# Patient Record
Sex: MALE | Race: WHITE | NOT HISPANIC OR LATINO | Employment: OTHER | URBAN - METROPOLITAN AREA
[De-identification: names, ages, dates, MRNs, and addresses within clinical notes are randomized per-mention and may not be internally consistent; named-entity substitution may affect disease eponyms.]

---

## 2017-01-30 ENCOUNTER — OFFICE VISIT (OUTPATIENT)
Dept: MEDICAL GROUP | Facility: MEDICAL CENTER | Age: 62
End: 2017-01-30
Payer: COMMERCIAL

## 2017-01-30 VITALS
RESPIRATION RATE: 16 BRPM | BODY MASS INDEX: 32.48 KG/M2 | WEIGHT: 232 LBS | HEART RATE: 63 BPM | HEIGHT: 71 IN | DIASTOLIC BLOOD PRESSURE: 78 MMHG | OXYGEN SATURATION: 97 % | TEMPERATURE: 97.2 F | SYSTOLIC BLOOD PRESSURE: 122 MMHG

## 2017-01-30 DIAGNOSIS — E66.09 NON MORBID OBESITY DUE TO EXCESS CALORIES: ICD-10-CM

## 2017-01-30 DIAGNOSIS — R73.02 IGT (IMPAIRED GLUCOSE TOLERANCE): ICD-10-CM

## 2017-01-30 DIAGNOSIS — R07.9 CHEST PAIN, UNSPECIFIED TYPE: ICD-10-CM

## 2017-01-30 DIAGNOSIS — E78.5 DYSLIPIDEMIA: Chronic | ICD-10-CM

## 2017-01-30 DIAGNOSIS — Z23 INFLUENZA VACCINE NEEDED: ICD-10-CM

## 2017-01-30 DIAGNOSIS — I10 HTN (HYPERTENSION), BENIGN: Chronic | ICD-10-CM

## 2017-01-30 PROCEDURE — 90471 IMMUNIZATION ADMIN: CPT | Performed by: INTERNAL MEDICINE

## 2017-01-30 PROCEDURE — 93000 ELECTROCARDIOGRAM COMPLETE: CPT | Performed by: INTERNAL MEDICINE

## 2017-01-30 PROCEDURE — 90686 IIV4 VACC NO PRSV 0.5 ML IM: CPT | Performed by: INTERNAL MEDICINE

## 2017-01-30 PROCEDURE — 99214 OFFICE O/P EST MOD 30 MIN: CPT | Mod: 25 | Performed by: INTERNAL MEDICINE

## 2017-01-30 RX ORDER — AMLODIPINE BESYLATE AND BENAZEPRIL HYDROCHLORIDE 5; 20 MG/1; MG/1
1 CAPSULE ORAL
Qty: 90 CAP | Refills: 3 | Status: SHIPPED | OUTPATIENT
Start: 2017-01-30 | End: 2017-08-01 | Stop reason: SDUPTHER

## 2017-01-30 RX ORDER — SERTRALINE HYDROCHLORIDE 100 MG/1
200 TABLET, FILM COATED ORAL DAILY
Qty: 180 TAB | Refills: 3 | Status: SHIPPED | OUTPATIENT
Start: 2017-01-30 | End: 2017-03-24 | Stop reason: SDUPTHER

## 2017-01-30 ASSESSMENT — PATIENT HEALTH QUESTIONNAIRE - PHQ9: CLINICAL INTERPRETATION OF PHQ2 SCORE: 0

## 2017-01-30 NOTE — PROGRESS NOTES
"CC: Follow-up multiple issues    HPI:   Dean presents today with the following.    1. Non morbid obesity due to excess calories  Not been seen for over a year weight has gone up and that time reporting minimal exercise or dietary changes.    2. HTN (hypertension), benign  Blood pressure under good control maintain on medications. He denies any lower extremity edema.    3. Dyslipidemia  Cholesterol elevated in the past likely needing statin has not taken for several years. He never followed up after elevated blood work last year.    4. IGT (impaired glucose tolerance)  Also elevated blood sugars no personal history of diabetes but again weight has gone up.    5. Chest pain, unspecified type  He does report episodes of chest pain or substernal in nature lasting 2-3 seconds not necessarily related to activity. No associated shortness of breath again not brought on by activity. He reports he had a stress test several years ago but results are not available.    6. Influenza vaccine needed        Patient Active Problem List    Diagnosis Date Noted   • Non morbid obesity due to excess calories 01/30/2017   • HTN (hypertension), benign 01/05/2012   • Dyslipidemia 01/05/2012   • Depression 01/05/2012   • BPH (benign prostatic hyperplasia) 01/05/2012   • ADD (attention deficit disorder) 01/05/2012   • ED (erectile dysfunction) 01/05/2012       Current Outpatient Prescriptions   Medication Sig Dispense Refill   • amlodipine-benazepril (LOTREL) 5-20 MG per capsule Take 1 Cap by mouth every day. 90 Cap 3   • sertraline (ZOLOFT) 100 MG Tab Take 2 Tabs by mouth every day. 180 Tab 3   • tadalafil (CIALIS) 10 MG tablet Take 1 Tab by mouth as needed for Erectile Dysfunction. 6 Tab 3     No current facility-administered medications for this visit.         Allergies as of 01/30/2017   • (No Known Allergies)        ROS: As per HPI.    /78 mmHg  Pulse 63  Temp(Src) 36.2 °C (97.2 °F)  Resp 16  Ht 1.803 m (5' 11\")  Wt 105.235 kg " (232 lb)  BMI 32.37 kg/m2  SpO2 97%    Physical Exam:  Gen:         Alert and oriented, No apparent distress.  Neck:        No Lymphadenopathy or Bruits.  Lungs:     Clear to auscultation bilaterally  CV:          Regular rate and rhythm. No murmurs, rubs or gallops.               Ext:          No clubbing, cyanosis, edema.    EKG:  Normal sinus rythm, no acute st-t wave changes.  Assessment and Plan.   61 y.o. male with the following issues.    1. Non morbid obesity due to excess calories  Discussed diet exercise and weight loss strategies. Have set a goal for 15 pounds in 3 months and will followup at that time.  - Patient identified as having weight management issue.  Appropriate orders and counseling given.    2. HTN (hypertension), benign  Currently well controlled, Discuss diet, exercise and salt restriction.  - amlodipine-benazepril (LOTREL) 5-20 MG per capsule; Take 1 Cap by mouth every day.  Dispense: 90 Cap; Refill: 3    3. Dyslipidemia  Recheck cholesterol  - COMP METABOLIC PANEL; Future  - LIPID PROFILE; Future    4. IGT (impaired glucose tolerance)  Again discussed diet and weight loss recheck blood work  - HEMOGLOBIN A1C; Future    5. Chest pain, unspecified type  EKG normal symptoms are not specific for chest pain having given ER precautions for any sustained chest pains. Will monitor for the next month and if symptoms are not abated may consider stress test.  - EKG    6. Influenza vaccine needed    - INFLUENZA VACCINE QUAD INJ >3Y(PF)

## 2017-01-30 NOTE — MR AVS SNAPSHOT
"        Dean Nobles   2017 9:20 AM   Office Visit   MRN: [id number]    Department:  [address] [place] [address]   Dept Phone:  [phone]    Description:  Male : 1955   Provider:  Jose Sanchez M.D.           Reason for Visit     Annual Exam     Medication Refill           Allergies as of 2017     No Known Allergies      You were diagnosed with     Non morbid obesity due to excess calories   [7030730]       HTN (hypertension), benign   [616718]       Dyslipidemia   [792594]       IGT (impaired glucose tolerance)   [726224]       Chest pain, unspecified type   [0023820]       Influenza vaccine needed   [[id number]]         Vital Signs     Blood Pressure Pulse Temperature Respirations Height Weight    122/78 mmHg 63 36.2 °C (97.2 °F) 16 1.803 m (5' 11\") 105.235 kg (232 lb)    Body Mass Index Oxygen Saturation Smoking Status             32.37 kg/m2 97% Never Smoker          Basic Information     Date Of Birth Sex Race Ethnicity Preferred Language    1955 Male White Non- English      Your appointments     Mar 13, 2017  9:40 AM   Established Patient with Jose Sanchez M.D.   [facility] [address]    [address]  [facility] [address]  [facility] [id number] [phone]           You will be receiving a confirmation call a few days before your appointment from our automated call confirmation system.              Problem List              ICD-10-CM Priority Class Noted - Resolved    HTN (hypertension), benign (Chronic) I10   2012 - Present    Dyslipidemia (Chronic) E78.5   2012 - Present    Depression (Chronic) F32.9   2012 - Present    BPH (benign prostatic hyperplasia) (Chronic) N40.0   2012 - Present    ADD (attention deficit disorder) (Chronic) F98.8   2012 - Present    ED (erectile dysfunction) (Chronic) N52.9   2012 - Present    Non morbid obesity due to excess calories E66.09   2017 - Present      Health Maintenance        Date Due Completion Dates   " IMM ZOSTER VACCINE 3/10/2013 ---    IMM INFLUENZA (1) 9/1/2016 11/18/2015, 11/18/2014, 11/15/2013, 11/21/2012, 1/5/2012    COLONOSCOPY 1/5/2018 1/5/2008 (Done)    Override on 1/5/2008: Done    IMM DTaP/Tdap/Td Vaccine (2 - Td) 1/21/2023 1/21/2013            Current Immunizations     Influenza TIV (IM) 11/15/2013, 11/21/2012, 1/5/2012    Influenza Vaccine Quad Inj (Pf)  Incomplete, 11/18/2015    Influenza Vaccine Quad Inj (Preserved) 11/18/2014    Tdap Vaccine 1/21/2013      Below and/or attached are the medications your provider expects you to take. Review all of your home medications and newly ordered medications with your provider and/or pharmacist. Follow medication instructions as directed by your provider and/or pharmacist. Please keep your medication list with you and share with your provider. Update the information when medications are discontinued, doses are changed, or new medications (including over-the-counter products) are added; and carry medication information at all times in the event of emergency situations     Allergies:  No Known Allergies          Medications  Valid as of: January 30, 2017 - 10:07 AM    Generic Name Brand Name Tablet Size Instructions for use    Amlodipine Besy-Benazepril HCl (Cap) LOTREL 5-20 MG Take 1 Cap by mouth every day.        Sertraline HCl (Tab) ZOLOFT 100 MG Take 2 Tabs by mouth every day.        Tadalafil (Tab) CIALIS 10 MG Take 1 Tab by mouth as needed for Erectile Dysfunction.        .                 Medicines prescribed today were sent to:     [facility] [place], [place] - [id number] [facility] [place] [address]    Phone: [phone] Fax: [phone]    Open 24 Hours?: No      Medication refill instructions:       If your prescription bottle indicates you have medication refills left, it is not necessary to call your provider’s office. Please contact your pharmacy and they will refill your medication.    If your prescription bottle indicates  you do not have any refills left, you may request refills at any time through one of the following ways: The online [employer] system (except Urgent Care), by calling your provider’s office, or by asking your pharmacy to contact your provider’s office with a refill request. Medication refills are processed only during regular business hours and may not be available until the next business day. Your provider may request additional information or to have a follow-up visit with you prior to refilling your medication.   *Please Note: Medication refills are assigned a new Rx number when refilled electronically. Your pharmacy may indicate that no refills were authorized even though a new prescription for the same medication is available at the pharmacy. Please request the medicine by name with the pharmacy before contacting your provider for a refill.        Your To Do List     Future Labs/Procedures Complete By Expires    COMP METABOLIC PANEL  As directed 1/31/2018    HEMOGLOBIN A1C  As directed 1/31/2018    LIPID PROFILE  As directed 1/31/2018         [employer] Access Code: Activation code not generated  Current [employer] Status: Active

## 2017-03-13 ENCOUNTER — APPOINTMENT (OUTPATIENT)
Dept: MEDICAL GROUP | Facility: MEDICAL CENTER | Age: 62
End: 2017-03-13
Payer: COMMERCIAL

## 2017-03-23 ENCOUNTER — OFFICE VISIT (OUTPATIENT)
Dept: MEDICAL GROUP | Facility: MEDICAL CENTER | Age: 62
End: 2017-03-23
Payer: COMMERCIAL

## 2017-03-23 VITALS
DIASTOLIC BLOOD PRESSURE: 74 MMHG | BODY MASS INDEX: 32.2 KG/M2 | HEIGHT: 71 IN | TEMPERATURE: 98.7 F | HEART RATE: 74 BPM | RESPIRATION RATE: 16 BRPM | SYSTOLIC BLOOD PRESSURE: 122 MMHG | WEIGHT: 230 LBS | OXYGEN SATURATION: 94 %

## 2017-03-23 DIAGNOSIS — R07.89 OTHER CHEST PAIN: ICD-10-CM

## 2017-03-23 DIAGNOSIS — E66.09 NON MORBID OBESITY DUE TO EXCESS CALORIES: ICD-10-CM

## 2017-03-23 DIAGNOSIS — E78.5 DYSLIPIDEMIA: Chronic | ICD-10-CM

## 2017-03-23 PROCEDURE — 99214 OFFICE O/P EST MOD 30 MIN: CPT | Performed by: INTERNAL MEDICINE

## 2017-03-23 RX ORDER — ATORVASTATIN CALCIUM 10 MG/1
10 TABLET, FILM COATED ORAL DAILY
Qty: 90 TAB | Refills: 3 | Status: SHIPPED | OUTPATIENT
Start: 2017-03-23 | End: 2017-08-01 | Stop reason: SDUPTHER

## 2017-03-23 NOTE — MR AVS SNAPSHOT
"        Dean Nobles   3/23/2017 8:20 AM   Office Visit   MRN: [id number]    Department:  [address] [place] [address]   Dept Phone:  [phone]    Description:  Male : 1955   Provider:  Jose Sanchez M.D.           Reason for Visit     Follow-Up           Allergies as of 3/23/2017     No Known Allergies      You were diagnosed with     Dyslipidemia   [884714]       Other chest pain   [786.59.ICD-9-CM]         Vital Signs     Blood Pressure Pulse Temperature Respirations Height Weight    122/74 mmHg 74 37.1 °C (98.7 °F) 16 1.803 m (5' 10.98\") 104.327 kg (230 lb)    Body Mass Index Oxygen Saturation Smoking Status             32.09 kg/m2 94% Never Smoker          Basic Information     Date Of Birth Sex Race Ethnicity Preferred Language    1955 Male White Non- English      Problem List              ICD-10-CM Priority Class Noted - Resolved    HTN (hypertension), benign (Chronic) I10   2012 - Present    Dyslipidemia (Chronic) E78.5   2012 - Present    Depression (Chronic) F32.9   2012 - Present    BPH (benign prostatic hyperplasia) (Chronic) N40.0   2012 - Present    ADD (attention deficit disorder) (Chronic) F98.8   2012 - Present    ED (erectile dysfunction) (Chronic) N52.9   2012 - Present    Non morbid obesity due to excess calories E66.09   2017 - Present      Health Maintenance        Date Due Completion Dates    IMM ZOSTER VACCINE 3/10/2013 ---    COLONOSCOPY 2018 (Done)    Override on 2008: Done    IMM DTaP/Tdap/Td Vaccine (2 - Td) 2023            Current Immunizations     Influenza TIV (IM) 11/15/2013, 2012, 2012    Influenza Vaccine Quad Inj (Pf) 2017 10:08 AM, 2015    Influenza Vaccine Quad Inj (Preserved) 2014    Tdap Vaccine 2013      Below and/or attached are the medications your provider expects you to take. Review all of your home medications and newly ordered medications with your provider and/or " pharmacist. Follow medication instructions as directed by your provider and/or pharmacist. Please keep your medication list with you and share with your provider. Update the information when medications are discontinued, doses are changed, or new medications (including over-the-counter products) are added; and carry medication information at all times in the event of emergency situations     Allergies:  No Known Allergies          Medications  Valid as of: March 23, 2017 -  8:39 AM    Generic Name Brand Name Tablet Size Instructions for use    Amlodipine Besy-Benazepril HCl (Cap) LOTREL 5-20 MG Take 1 Cap by mouth every day.        Atorvastatin Calcium (Tab) LIPITOR 10 MG Take 1 Tab by mouth every day.        Sertraline HCl (Tab) ZOLOFT 100 MG Take 2 Tabs by mouth every day.        Tadalafil (Tab) CIALIS 10 MG Take 1 Tab by mouth as needed for Erectile Dysfunction.        .                 Medicines prescribed today were sent to:     [facility] [address] [place] [address], [place] - [address] [address] [facility] [place] [address]    Phone: [phone] Fax: [phone]    Open 24 Hours?: No      Medication refill instructions:       If your prescription bottle indicates you have medication refills left, it is not necessary to call your provider’s office. Please contact your pharmacy and they will refill your medication.    If your prescription bottle indicates you do not have any refills left, you may request refills at any time through one of the following ways: The online [employer] system (except Urgent Care), by calling your provider’s office, or by asking your pharmacy to contact your provider’s office with a refill request. Medication refills are processed only during regular business hours and may not be available until the next business day. Your provider may request additional information or to have a follow-up visit with you prior to refilling your medication.   *Please Note: Medication refills are assigned a new  Rx number when refilled electronically. Your pharmacy may indicate that no refills were authorized even though a new prescription for the same medication is available at the pharmacy. Please request the medicine by name with the pharmacy before contacting your provider for a refill.        Your To Do List     Future Labs/Procedures Complete By Expires    CARDIAC STRESS TEST TREADMILL ONLY  As directed 3/23/2018    COMP METABOLIC PANEL  As directed 3/24/2018    LIPID PROFILE  As directed 3/24/2018    Scheduling Instructions:    Please provide patient with copy of results.         [employer] Access Code: Activation code not generated  Current [employer] Status: Active

## 2017-03-23 NOTE — PROGRESS NOTES
"CC: Follow-up cholesterol and chest pain.    HPI:   Dean presents today with the following.    1. Dyslipidemia  Presents after having blood work bring results with him. LDL cholesterol comes back at 148. He was on Lipitor in the past but stopped taking it. He denies any shortness of breath or edema.    2. Other chest pain  Last visit he did have complaints of some intermittent chest pain. EKG was unremarkable. He reports he's had one further recurrence just yesterday. He feels it may be related to heartburn more than anything. Denied any radiation of shortness of breath lasting for approximately 20 minutes before resolving.      3. Non morbid obesity due to excess calories  Weight is down 2 pounds since last visit reports he is not changed significantly in his dietary habits.      Patient Active Problem List    Diagnosis Date Noted   • Non morbid obesity due to excess calories 01/30/2017   • HTN (hypertension), benign 01/05/2012   • Dyslipidemia 01/05/2012   • Depression 01/05/2012   • BPH (benign prostatic hyperplasia) 01/05/2012   • ADD (attention deficit disorder) 01/05/2012   • ED (erectile dysfunction) 01/05/2012       Current Outpatient Prescriptions   Medication Sig Dispense Refill   • atorvastatin (LIPITOR) 10 MG Tab Take 1 Tab by mouth every day. 90 Tab 3   • amlodipine-benazepril (LOTREL) 5-20 MG per capsule Take 1 Cap by mouth every day. 90 Cap 3   • sertraline (ZOLOFT) 100 MG Tab Take 2 Tabs by mouth every day. 180 Tab 3   • tadalafil (CIALIS) 10 MG tablet Take 1 Tab by mouth as needed for Erectile Dysfunction. 6 Tab 3     No current facility-administered medications for this visit.         Allergies as of 03/23/2017   • (No Known Allergies)        ROS: As per HPI.    /74 mmHg  Pulse 74  Temp(Src) 37.1 °C (98.7 °F)  Resp 16  Ht 1.803 m (5' 10.98\")  Wt 104.327 kg (230 lb)  BMI 32.09 kg/m2  SpO2 94%    Physical Exam:  Gen:         Alert and oriented, No apparent distress.  Neck:        No " Lymphadenopathy or Bruits.  Lungs:     Clear to auscultation bilaterally  CV:          Regular rate and rhythm. No murmurs, rubs or gallops.               Ext:          No clubbing, cyanosis, edema.      Assessment and Plan.   62 y.o. male with the following issues.    1. Dyslipidemia  Starting back on statin cautioned about side effects recheck 3 months.  - atorvastatin (LIPITOR) 10 MG Tab; Take 1 Tab by mouth every day.  Dispense: 90 Tab; Refill: 3  - COMP METABOLIC PANEL; Future  - LIPID PROFILE; Future    2. Other chest pain  Given the recurrence of symptoms have written for a stress treadmill follow-up for abnormalities in ER precautions given for severe chest pain.  - CARDIAC STRESS TEST TREADMILL ONLY; Future    3. Non morbid obesity due to excess calories  Discussed diet exercise and weight loss strategies. Have set a goal for 15 pounds in 3 months and will followup at that time.

## 2017-03-24 RX ORDER — SERTRALINE HYDROCHLORIDE 100 MG/1
200 TABLET, FILM COATED ORAL DAILY
Qty: 180 TAB | Refills: 3 | Status: SHIPPED | OUTPATIENT
Start: 2017-03-24 | End: 2017-03-27 | Stop reason: SDUPTHER

## 2017-03-24 NOTE — TELEPHONE ENCOUNTER
Was the patient seen in the last year in this department? Yes     Does patient have an active prescription for medications requested? No     Received Request Via: Pharmacy Wants MICHELLE Removed

## 2017-03-27 RX ORDER — SERTRALINE HYDROCHLORIDE 100 MG/1
200 TABLET, FILM COATED ORAL DAILY
Qty: 180 TAB | Refills: 3 | Status: SHIPPED | OUTPATIENT
Start: 2017-03-27 | End: 2017-08-01 | Stop reason: SDUPTHER

## 2017-07-12 ENCOUNTER — NON-PROVIDER VISIT (OUTPATIENT)
Dept: CARDIOLOGY | Facility: MEDICAL CENTER | Age: 62
End: 2017-07-12
Attending: INTERNAL MEDICINE
Payer: COMMERCIAL

## 2017-07-12 DIAGNOSIS — R07.9 CHEST PAIN, UNSPECIFIED TYPE: ICD-10-CM

## 2017-07-12 DIAGNOSIS — R07.89 OTHER CHEST PAIN: ICD-10-CM

## 2017-07-12 LAB — TREADMILL STRESS: NORMAL

## 2017-07-12 PROCEDURE — 93015 CV STRESS TEST SUPVJ I&R: CPT | Performed by: INTERNAL MEDICINE

## 2017-07-17 NOTE — PROGRESS NOTES
Quick Note:    Dear Kym,    Can you please let Dean Nobles know that result is ok and I will see patient as scheduled?    Thanks [place].    ______

## 2017-07-18 ENCOUNTER — TELEPHONE (OUTPATIENT)
Dept: CARDIOLOGY | Facility: MEDICAL CENTER | Age: 62
End: 2017-07-18

## 2017-07-18 NOTE — TELEPHONE ENCOUNTER
Called patient and advised him of Dr. Brock's treadmill stress test interpretation.     JULIETTE ARCHER

## 2017-07-18 NOTE — TELEPHONE ENCOUNTER
----- Message from Vilma Brock M.D. sent at 7/17/2017  9:18 AM PDT -----  Dear Kym,    Can you please let Dean Nobles know that result is ok and I will see patient as scheduled?    Thanks [place].

## 2017-08-01 ENCOUNTER — OFFICE VISIT (OUTPATIENT)
Dept: MEDICAL GROUP | Facility: MEDICAL CENTER | Age: 62
End: 2017-08-01
Payer: COMMERCIAL

## 2017-08-01 VITALS
RESPIRATION RATE: 16 BRPM | SYSTOLIC BLOOD PRESSURE: 120 MMHG | TEMPERATURE: 98.2 F | DIASTOLIC BLOOD PRESSURE: 86 MMHG | HEART RATE: 57 BPM | OXYGEN SATURATION: 95 % | BODY MASS INDEX: 32.06 KG/M2 | HEIGHT: 71 IN | WEIGHT: 229 LBS

## 2017-08-01 DIAGNOSIS — I10 HTN (HYPERTENSION), BENIGN: Chronic | ICD-10-CM

## 2017-08-01 DIAGNOSIS — Z00.00 WELLNESS EXAMINATION: ICD-10-CM

## 2017-08-01 DIAGNOSIS — E78.5 DYSLIPIDEMIA: Chronic | ICD-10-CM

## 2017-08-01 DIAGNOSIS — E66.9 OBESITY (BMI 30.0-34.9): ICD-10-CM

## 2017-08-01 PROBLEM — E66.811 OBESITY (BMI 30.0-34.9): Status: ACTIVE | Noted: 2017-08-01

## 2017-08-01 PROCEDURE — 99396 PREV VISIT EST AGE 40-64: CPT | Performed by: INTERNAL MEDICINE

## 2017-08-01 RX ORDER — SERTRALINE HYDROCHLORIDE 100 MG/1
200 TABLET, FILM COATED ORAL DAILY
Qty: 180 TAB | Refills: 3 | Status: SHIPPED | OUTPATIENT
Start: 2017-08-01 | End: 2018-10-22 | Stop reason: SDUPTHER

## 2017-08-01 RX ORDER — AMLODIPINE BESYLATE AND BENAZEPRIL HYDROCHLORIDE 5; 20 MG/1; MG/1
1 CAPSULE ORAL
Qty: 90 CAP | Refills: 3 | Status: SHIPPED | OUTPATIENT
Start: 2017-08-01 | End: 2018-10-22 | Stop reason: SDUPTHER

## 2017-08-01 RX ORDER — ATORVASTATIN CALCIUM 10 MG/1
10 TABLET, FILM COATED ORAL DAILY
Qty: 90 TAB | Refills: 3 | Status: SHIPPED | OUTPATIENT
Start: 2017-08-01 | End: 2018-10-22 | Stop reason: SDUPTHER

## 2017-08-01 NOTE — PROGRESS NOTES
"CC: Wellness examination    HPI:   Dean presents today with the following.    1. Wellness examination  Presents had blood work done showing good results well-controlled cholesterol maintained on medications. He denies any chest pain or does have some dyspnea on exertion but again a recent stress test was normal. Overall feeling well and those he needs to exercise and lose weight. Is current with all her major screenings.          Patient Active Problem List    Diagnosis Date Noted   • Obesity (BMI 30.0-34.9) 08/01/2017   • Non morbid obesity due to excess calories 01/30/2017   • HTN (hypertension), benign 01/05/2012   • Dyslipidemia 01/05/2012   • Depression 01/05/2012   • BPH (benign prostatic hyperplasia) 01/05/2012   • ADD (attention deficit disorder) 01/05/2012   • ED (erectile dysfunction) 01/05/2012       Current Outpatient Prescriptions   Medication Sig Dispense Refill   • sertraline (ZOLOFT) 100 MG Tab Take 2 Tabs by mouth every day. 180 Tab 3   • amlodipine-benazepril (LOTREL) 5-20 MG per capsule Take 1 Cap by mouth every day. 90 Cap 3   • atorvastatin (LIPITOR) 10 MG Tab Take 1 Tab by mouth every day. 90 Tab 3   • tadalafil (CIALIS) 10 MG tablet Take 1 Tab by mouth as needed for Erectile Dysfunction. 6 Tab 3     No current facility-administered medications for this visit.         Allergies as of 08/01/2017   • (No Known Allergies)        ROS: As per HPI.    /86 mmHg  Pulse 57  Temp(Src) 36.8 °C (98.2 °F)  Resp 16  Ht 1.803 m (5' 10.98\")  Wt 103.874 kg (229 lb)  BMI 31.95 kg/m2  SpO2 95%    Physical Exam:  Gen:         Alert and oriented, No apparent distress.  Neck:        No Lymphadenopathy or Bruits.  Lungs:     Clear to auscultation bilaterally  CV:          Regular rate and rhythm. No murmurs, rubs or gallops.               Ext:          No clubbing, cyanosis, edema.      Assessment and Plan.   62 y.o. male with the following issues.    1. Wellness examination  Discussed healthy lifestyle " habits as well as screening regimens.

## 2017-08-01 NOTE — MR AVS SNAPSHOT
"        Dean Nobles   2017 11:00 AM   Office Visit   MRN: [id number]    Department:  [address] [place] [address]   Dept Phone:  [phone]    Description:  Male : 1955   Provider:  Jose Sanchez M.D.           Reason for Visit     Annual Exam     Results review lab results      Allergies as of 2017     No Known Allergies      You were diagnosed with     Wellness examination   [6838203]       Obesity (BMI 30.0-34.9)   [638763]       HTN (hypertension), benign   [330118]       Dyslipidemia   [305511]         Vital Signs     Blood Pressure Pulse Temperature Respirations Height Weight    120/86 mmHg 57 36.8 °C (98.2 °F) 16 1.803 m (5' 10.98\") 103.874 kg (229 lb)    Body Mass Index Oxygen Saturation Smoking Status             31.95 kg/m2 95% Never Smoker          Basic Information     Date Of Birth Sex Race Ethnicity Preferred Language    1955 Male White Non- English      Problem List              ICD-10-CM Priority Class Noted - Resolved    HTN (hypertension), benign (Chronic) I10   2012 - Present    Dyslipidemia (Chronic) E78.5   2012 - Present    Depression (Chronic) F32.9   2012 - Present    BPH (benign prostatic hyperplasia) (Chronic) N40.0   2012 - Present    ADD (attention deficit disorder) (Chronic) F98.8   2012 - Present    ED (erectile dysfunction) (Chronic) N52.9   2012 - Present    Non morbid obesity due to excess calories E66.09   2017 - Present    Obesity (BMI 30.0-34.9) E66.9   2017 - Present      Health Maintenance        Date Due Completion Dates    IMM ZOSTER VACCINE 3/10/2013 ---    IMM INFLUENZA (1) 2017, 2015, 2014, 11/15/2013, 2012, 2012    COLONOSCOPY 2018 (Done)    Override on 2008: Done    IMM DTaP/Tdap/Td Vaccine (2 - Td) 2023            Current Immunizations     Influenza TIV (IM) 11/15/2013, 2012, 2012    Influenza Vaccine Quad Inj (Pf) 2017 10:08 AM, " 11/18/2015    Influenza Vaccine Quad Inj (Preserved) 11/18/2014    Tdap Vaccine 1/21/2013      Below and/or attached are the medications your provider expects you to take. Review all of your home medications and newly ordered medications with your provider and/or pharmacist. Follow medication instructions as directed by your provider and/or pharmacist. Please keep your medication list with you and share with your provider. Update the information when medications are discontinued, doses are changed, or new medications (including over-the-counter products) are added; and carry medication information at all times in the event of emergency situations     Allergies:  No Known Allergies          Medications  Valid as of: August 01, 2017 - 11:46 AM    Generic Name Brand Name Tablet Size Instructions for use    Amlodipine Besy-Benazepril HCl (Cap) LOTREL 5-20 MG Take 1 Cap by mouth every day.        Atorvastatin Calcium (Tab) LIPITOR 10 MG Take 1 Tab by mouth every day.        Sertraline HCl (Tab) ZOLOFT 100 MG Take 2 Tabs by mouth every day.        Tadalafil (Tab) CIALIS 10 MG Take 1 Tab by mouth as needed for Erectile Dysfunction.        .                 Medicines prescribed today were sent to:     [facility] [address] [place] [address], [place] - [address] [address]    Phone: [phone] Fax: [phone]    Open 24 Hours?: No      Medication refill instructions:       If your prescription bottle indicates you have medication refills left, it is not necessary to call your provider’s office. Please contact your pharmacy and they will refill your medication.    If your prescription bottle indicates you do not have any refills left, you may request refills at any time through one of the following ways: The online [employer] system (except Urgent Care), by calling your provider’s office, or by asking your pharmacy to contact your provider’s office with a refill request. Medication refills are processed  only during regular business hours and may not be available until the next business day. Your provider may request additional information or to have a follow-up visit with you prior to refilling your medication.   *Please Note: Medication refills are assigned a new Rx number when refilled electronically. Your pharmacy may indicate that no refills were authorized even though a new prescription for the same medication is available at the pharmacy. Please request the medicine by name with the pharmacy before contacting your provider for a refill.           [employer] Access Code: Activation code not generated  Current [employer] Status: Active

## 2018-02-09 DIAGNOSIS — I10 HTN (HYPERTENSION), BENIGN: Chronic | ICD-10-CM

## 2018-02-09 RX ORDER — AMLODIPINE BESYLATE AND BENAZEPRIL HYDROCHLORIDE 5; 20 MG/1; MG/1
CAPSULE ORAL
Qty: 30 CAP | Refills: 11 | Status: SHIPPED | OUTPATIENT
Start: 2018-02-09 | End: 2018-10-22

## 2018-10-12 ENCOUNTER — PATIENT OUTREACH (OUTPATIENT)
Dept: HEALTH INFORMATION MANAGEMENT | Facility: OTHER | Age: 63
End: 2018-10-12

## 2018-10-22 ENCOUNTER — OFFICE VISIT (OUTPATIENT)
Dept: MEDICAL GROUP | Facility: MEDICAL CENTER | Age: 63
End: 2018-10-22
Payer: MEDICARE

## 2018-10-22 VITALS
SYSTOLIC BLOOD PRESSURE: 126 MMHG | HEART RATE: 50 BPM | DIASTOLIC BLOOD PRESSURE: 74 MMHG | BODY MASS INDEX: 31.78 KG/M2 | OXYGEN SATURATION: 93 % | RESPIRATION RATE: 16 BRPM | WEIGHT: 227 LBS | HEIGHT: 71 IN | TEMPERATURE: 98.6 F

## 2018-10-22 DIAGNOSIS — Z12.11 SCREEN FOR COLON CANCER: ICD-10-CM

## 2018-10-22 DIAGNOSIS — N40.1 BENIGN PROSTATIC HYPERPLASIA WITH URINARY FREQUENCY: Chronic | ICD-10-CM

## 2018-10-22 DIAGNOSIS — F98.8 ATTENTION DEFICIT DISORDER (ADD) WITHOUT HYPERACTIVITY: Chronic | ICD-10-CM

## 2018-10-22 DIAGNOSIS — Z00.00 INITIAL MEDICARE ANNUAL WELLNESS VISIT: ICD-10-CM

## 2018-10-22 DIAGNOSIS — I10 HTN (HYPERTENSION), BENIGN: Chronic | ICD-10-CM

## 2018-10-22 DIAGNOSIS — M25.542 ARTHRALGIA OF BOTH HANDS: ICD-10-CM

## 2018-10-22 DIAGNOSIS — M25.541 ARTHRALGIA OF BOTH HANDS: ICD-10-CM

## 2018-10-22 DIAGNOSIS — F33.1 MODERATE EPISODE OF RECURRENT MAJOR DEPRESSIVE DISORDER (HCC): Chronic | ICD-10-CM

## 2018-10-22 DIAGNOSIS — E78.5 DYSLIPIDEMIA: Chronic | ICD-10-CM

## 2018-10-22 DIAGNOSIS — Z23 NEED FOR VACCINATION: ICD-10-CM

## 2018-10-22 DIAGNOSIS — R35.0 BENIGN PROSTATIC HYPERPLASIA WITH URINARY FREQUENCY: Chronic | ICD-10-CM

## 2018-10-22 DIAGNOSIS — N52.01 ERECTILE DYSFUNCTION DUE TO ARTERIAL INSUFFICIENCY: Chronic | ICD-10-CM

## 2018-10-22 DIAGNOSIS — E66.9 OBESITY (BMI 30-39.9): ICD-10-CM

## 2018-10-22 PROBLEM — E66.811 OBESITY (BMI 30.0-34.9): Status: RESOLVED | Noted: 2017-08-01 | Resolved: 2018-10-22

## 2018-10-22 PROCEDURE — G0008 ADMIN INFLUENZA VIRUS VAC: HCPCS | Performed by: INTERNAL MEDICINE

## 2018-10-22 PROCEDURE — 99213 OFFICE O/P EST LOW 20 MIN: CPT | Mod: 25 | Performed by: INTERNAL MEDICINE

## 2018-10-22 PROCEDURE — G0009 ADMIN PNEUMOCOCCAL VACCINE: HCPCS | Performed by: INTERNAL MEDICINE

## 2018-10-22 PROCEDURE — G0402 INITIAL PREVENTIVE EXAM: HCPCS | Performed by: INTERNAL MEDICINE

## 2018-10-22 PROCEDURE — 90670 PCV13 VACCINE IM: CPT | Performed by: INTERNAL MEDICINE

## 2018-10-22 PROCEDURE — 90662 IIV NO PRSV INCREASED AG IM: CPT | Performed by: INTERNAL MEDICINE

## 2018-10-22 RX ORDER — AMLODIPINE BESYLATE AND BENAZEPRIL HYDROCHLORIDE 5; 20 MG/1; MG/1
1 CAPSULE ORAL
Qty: 90 CAP | Refills: 3 | Status: SHIPPED | OUTPATIENT
Start: 2018-10-22 | End: 2019-01-18 | Stop reason: SDUPTHER

## 2018-10-22 RX ORDER — AMLODIPINE BESYLATE AND BENAZEPRIL HYDROCHLORIDE 5; 20 MG/1; MG/1
1 CAPSULE ORAL
Qty: 90 CAP | Refills: 3 | Status: SHIPPED | OUTPATIENT
Start: 2018-10-22 | End: 2018-10-22 | Stop reason: SDUPTHER

## 2018-10-22 RX ORDER — ATORVASTATIN CALCIUM 10 MG/1
10 TABLET, FILM COATED ORAL DAILY
Qty: 90 TAB | Refills: 3 | Status: SHIPPED | OUTPATIENT
Start: 2018-10-22 | End: 2019-10-22 | Stop reason: SDUPTHER

## 2018-10-22 RX ORDER — SERTRALINE HYDROCHLORIDE 100 MG/1
200 TABLET, FILM COATED ORAL DAILY
Qty: 180 TAB | Refills: 3 | Status: SHIPPED | OUTPATIENT
Start: 2018-10-22 | End: 2019-10-22 | Stop reason: SDUPTHER

## 2018-10-22 RX ORDER — SILDENAFIL 100 MG/1
100 TABLET, FILM COATED ORAL PRN
Qty: 10 TAB | Refills: 3 | Status: SHIPPED | OUTPATIENT
Start: 2018-10-22 | End: 2020-01-27 | Stop reason: SDUPTHER

## 2018-10-22 ASSESSMENT — PATIENT HEALTH QUESTIONNAIRE - PHQ9: CLINICAL INTERPRETATION OF PHQ2 SCORE: 0

## 2018-10-22 ASSESSMENT — ACTIVITIES OF DAILY LIVING (ADL): BATHING_REQUIRES_ASSISTANCE: 0

## 2018-10-22 ASSESSMENT — ENCOUNTER SYMPTOMS: GENERAL WELL-BEING: GOOD

## 2018-10-22 NOTE — PROGRESS NOTES
CC: Wellness examination: Complaining of arthralgias and rectal dysfunction    HPI:   Dean presents today with the following.      1. Initial Medicare annual wellness visit  Screenings performed below information given on advanced directives.    2. Arthralgia of both hands  He is complaining of bilateral hand pain 3-4 out of 10 intensity with morning joint stiffness.  Does not last for long periods of time.'s been going on for last 2-3 months.  Pain does improve as the day progresses.       3. Erectile dysfunction due to arterial insufficiency   Does have erectile dysfunction has been taking medication in the past would like to try other generic agents.  Denying any current chest pain or shortness of breath with activity        Information for advance directives given to patient or instructed to bring in advance directives into to office to put in chart.      Depression Screening    Little interest or pleasure in doing things?  0 - not at all  Feeling down, depressed , or hopeless? 0 - not at all  Patient Health Questionnaire Score: 0     If depressive symptoms identified deferred to follow up visit unless specifically addressed in assessment and plan.    Interpretation of PHQ-9 Total Score   Score Severity   1-4 No Depression   5-9 Mild Depression   10-14 Moderate Depression   15-19 Moderately Severe Depression   20-27 Severe Depression    Screening for Cognitive Impairment    Three Minute Recall (leader, season, table) 3/3 Leader season table  [place]awyer clock face with all 12 numbers and set the hands to show 10 past 11.  Yes 2/2 clock 11:10  Cognitive concerns identified deferred for follow up unless specifically addressed in assessment and plan.    Fall Risk Assessment    Has the patient had two or more falls in the last year or any fall with injury in the last year?  No    Safety Assessment    Throw rugs on floor.  No  Handrails on all stairs.  Yes  Good lighting in all hallways.  Yes  Difficulty hearing.   No  Patient counseled about all safety risks that were identified.    Functional Assessment ADLs    Are there any barriers preventing you from cooking for yourself or meeting nutritional needs?  No.    Are there any barriers preventing you from driving safely or obtaining transportation?  No.    Are there any barriers preventing you from using a telephone or calling for help?  No.    Are there any barriers preventing you from shopping?  No.    Are there any barriers preventing you from taking care of your own finances?  No.    Are there any barriers preventing you from managing your medications?  No.    Are there any barriers preventing you from showering, bathing or dressing yourself?  No.    Are you currently engaging in any exercise or physical activity?  No.     What is your perception of your health?  Good.      Health Maintenance Summary                Annual Wellness Visit Overdue 1955     COLONOSCOPY Overdue 1/5/2018      Done 1/5/2008     IMM PNEUMOCOCCAL 65+ (ADULT) LOW/MEDIUM RISK SERIES Overdue 3/10/2018     IMM INFLUENZA Overdue 9/1/2018      Done 1/30/2017 Imm Admin: Influenza Vaccine Quad Inj (Pf)     Patient has more history with this topic...    IMM ZOSTER VACCINES Postponed 10/21/2032 Originally 3/10/2003. Insurance/Financial    IMM DTaP/Tdap/Td Vaccine Next Due 1/21/2023      Done 1/21/2013 Imm Admin: Tdap Vaccine          Patient Care Team:  Jose Sanchez M.D. as PCP - General  Cori MCNULTY DDS            Health Care Screening: Age-appropriate preventive services that Medicare covers were discussed today and ordered as indicated and agreed upon by the patient, and as recommended by USPTF and ACIP.     Patient Active Problem List    Diagnosis Date Noted   • Obesity (BMI 30-39.9) 10/22/2018   • Non morbid obesity due to excess calories 01/30/2017   • HTN (hypertension), benign 01/05/2012   • Dyslipidemia 01/05/2012   • Depression 01/05/2012   • BPH (benign prostatic hyperplasia)  "01/05/2012   • ADD (attention deficit disorder) 01/05/2012   • ED (erectile dysfunction) 01/05/2012       Current Outpatient Prescriptions   Medication Sig Dispense Refill   • amlodipine-benazepril (LOTREL) 5-20 MG per capsule Take 1 Cap by mouth every day. 90 Cap 3   • atorvastatin (LIPITOR) 10 MG Tab Take 1 Tab by mouth every day. 90 Tab 3   • sertraline (ZOLOFT) 100 MG Tab Take 2 Tabs by mouth every day. 180 Tab 3   • sildenafil citrate (VIAGRA) 100 MG tablet Take 1 Tab by mouth as needed for Erectile Dysfunction. 10 Tab 3     No current facility-administered medications for this visit.        Family History   Problem Relation Age of Onset   • Heart Disease Mother    • Diabetes Mother         Type 2   • Cancer Father         skin   • Other Father         liver cirrhosis   • Diabetes Sister    • Hypertension Sister    • Diabetes Sister    • Hypertension Sister    • Other Sister         Kidney failure       Social History     Social History   • Marital status:      Spouse name: N/A   • Number of children: N/A   • Years of education: N/A     Occupational History   • Not on file.     Social History Main Topics   • Smoking status: Never Smoker   • Smokeless tobacco: Never Used   • Alcohol use 6.0 oz/week     12 Glasses of wine per week   • Drug use: No   • Sexual activity: Yes     Partners: Female     Other Topics Concern   • Not on file     Social History Narrative   • No narrative on file       Past Surgical History:   Procedure Laterality Date   • LAMINOTOMY         Allergies as of 10/22/2018   • (No Known Allergies)        ROS: Denies Chest pain, SOB, LE edema.    /74   Pulse (!) 50   Temp 37 °C (98.6 °F)   Resp 16   Ht 1.8 m (5' 10.87\")   Wt 103 kg (227 lb)   SpO2 93%   BMI 31.78 kg/m²     Physical Exam:  Gen:         Alert and oriented, No apparent distress.  Neck:        No Lymphadenopathy or Bruits.  Lungs:     Clear to auscultation bilaterally  CV:          Regular rate and rhythm. No " murmurs, rubs or gallops.  Abd:         Soft non tender, non distended. Normal active bowel sounds.  No  Hepatosplenomegaly, No pulsatile masses.                   Ext:          No clubbing, cyanosis, edema.      Assessment and Plan.   63 y.o. male with the following issues.    1. Initial Medicare annual wellness visit  Well  - Initial Wellness Visit - Includes PPPS ()    2. Arthralgia of both hands  Suggestions of possible rheumatologic process have written for blood work.  - RHEUMATOID ARTHRITIS FACTOR; Future  - WESTERGREN SED RATE; Future  - CCP ANTIBODY; Future    3. Erectile dysfunction due to arterial insufficiency  Have given her prescription medication caution about side effects  - sildenafil citrate (VIAGRA) 100 MG tablet; Take 1 Tab by mouth as needed for Erectile Dysfunction.  Dispense: 10 Tab; Refill: 3    4. HTN (hypertension), benign  Currently well controlled, Discuss diet, exercise and salt restriction.  No change to medication therapy.  - Initial Wellness Visit - Includes PPPS ()  - amlodipine-benazepril (LOTREL) 5-20 MG per capsule; Take 1 Cap by mouth every day.  Dispense: 90 Cap; Refill: 3    5. Dyslipidemia  Recheck cholesterol  - Initial Wellness Visit - Includes PPPS ()  - COMP METABOLIC PANEL; Future  - LIPID PROFILE; Future  - atorvastatin (LIPITOR) 10 MG Tab; Take 1 Tab by mouth every day.  Dispense: 90 Tab; Refill: 3    6. Obesity (BMI 30-39.9)  Discussion about diet exercise weight loss  - Patient identified as having weight management issue.  Appropriate orders and counseling given.  - Initial Wellness Visit - Includes PPPS ()    7. Attention deficit disorder (ADD) without hyperactivity  Stable no change in therapy  - Initial Wellness Visit - Includes PPPS ()    8. Benign prostatic hyperplasia with urinary frequency  Denying any new symptomology  - Initial Wellness Visit - Includes PPPS ()    9. Moderate episode of recurrent major depressive disorder  (HCC)  Stable no change to therapy  - Initial Wellness Visit - Includes PPPS ()    10. Need for vaccination    - Pneumococcal Conjugate Vaccine 13-Valent  - Influenza Vaccine, High Dose (65+ Only)    11. Screen for colon cancer    - REFERRAL TO GASTROENTEROLOGY        Referrals offered: Community-based lifestyle interventions to reduce health risks and promote self-management and wellness, fall prevention, nutrition, physical activity, tobacco-use cessation, weight loss, and mental health services as per orders if indicated.    Discussion today about general wellness and lifestyle habits:    · Prevent falls and reduce trip hazards; Cautioned about securing or removing rugs.  · Have a working fire alarm and carbon monoxide detector;   · Engage in regular physical activity and social activities

## 2018-11-01 ENCOUNTER — HOSPITAL ENCOUNTER (OUTPATIENT)
Dept: LAB | Facility: MEDICAL CENTER | Age: 63
End: 2018-11-01
Attending: INTERNAL MEDICINE
Payer: MEDICARE

## 2018-11-01 DIAGNOSIS — E78.5 DYSLIPIDEMIA: Chronic | ICD-10-CM

## 2018-11-01 DIAGNOSIS — M25.541 ARTHRALGIA OF BOTH HANDS: ICD-10-CM

## 2018-11-01 DIAGNOSIS — M25.542 ARTHRALGIA OF BOTH HANDS: ICD-10-CM

## 2018-11-01 LAB
ALBUMIN SERPL BCP-MCNC: 4.6 G/DL (ref 3.2–4.9)
ALBUMIN/GLOB SERPL: 2 G/DL
ALP SERPL-CCNC: 43 U/L (ref 30–99)
ALT SERPL-CCNC: 28 U/L (ref 2–50)
ANION GAP SERPL CALC-SCNC: 8 MMOL/L (ref 0–11.9)
AST SERPL-CCNC: 24 U/L (ref 12–45)
BILIRUB SERPL-MCNC: 0.8 MG/DL (ref 0.1–1.5)
BUN SERPL-MCNC: 20 MG/DL (ref 8–22)
CALCIUM SERPL-MCNC: 9.7 MG/DL (ref 8.5–10.5)
CHLORIDE SERPL-SCNC: 104 MMOL/L (ref 96–112)
CHOLEST SERPL-MCNC: 185 MG/DL (ref 100–199)
CO2 SERPL-SCNC: 27 MMOL/L (ref 20–33)
CREAT SERPL-MCNC: 0.82 MG/DL (ref 0.5–1.4)
ERYTHROCYTE [SEDIMENTATION RATE] IN BLOOD BY WESTERGREN METHOD: 20 MM/HOUR (ref 0–20)
FASTING STATUS PATIENT QL REPORTED: NORMAL
GLOBULIN SER CALC-MCNC: 2.3 G/DL (ref 1.9–3.5)
GLUCOSE SERPL-MCNC: 77 MG/DL (ref 65–99)
HDLC SERPL-MCNC: 69 MG/DL
LDLC SERPL CALC-MCNC: 102 MG/DL
POTASSIUM SERPL-SCNC: 4.3 MMOL/L (ref 3.6–5.5)
PROT SERPL-MCNC: 6.9 G/DL (ref 6–8.2)
RHEUMATOID FACT SER IA-ACNC: <10 IU/ML (ref 0–14)
SODIUM SERPL-SCNC: 139 MMOL/L (ref 135–145)
TRIGL SERPL-MCNC: 72 MG/DL (ref 0–149)

## 2018-11-01 PROCEDURE — 80061 LIPID PANEL: CPT

## 2018-11-01 PROCEDURE — 80053 COMPREHEN METABOLIC PANEL: CPT

## 2018-11-01 PROCEDURE — 85652 RBC SED RATE AUTOMATED: CPT

## 2018-11-01 PROCEDURE — 86200 CCP ANTIBODY: CPT

## 2018-11-01 PROCEDURE — 36415 COLL VENOUS BLD VENIPUNCTURE: CPT

## 2018-11-01 PROCEDURE — 86431 RHEUMATOID FACTOR QUANT: CPT

## 2018-11-03 LAB — CCP IGG SERPL-ACNC: 4 UNITS (ref 0–19)

## 2019-01-18 DIAGNOSIS — I10 HTN (HYPERTENSION), BENIGN: Chronic | ICD-10-CM

## 2019-01-18 RX ORDER — AMLODIPINE BESYLATE AND BENAZEPRIL HYDROCHLORIDE 5; 20 MG/1; MG/1
1 CAPSULE ORAL
Qty: 90 CAP | Refills: 3 | Status: SHIPPED | OUTPATIENT
Start: 2019-01-18 | End: 2019-10-29 | Stop reason: SDUPTHER

## 2019-04-17 ENCOUNTER — OFFICE VISIT (OUTPATIENT)
Dept: MEDICAL GROUP | Facility: MEDICAL CENTER | Age: 64
End: 2019-04-17
Payer: MEDICARE

## 2019-04-17 VITALS
HEIGHT: 71 IN | OXYGEN SATURATION: 98 % | TEMPERATURE: 97.8 F | DIASTOLIC BLOOD PRESSURE: 70 MMHG | HEART RATE: 104 BPM | BODY MASS INDEX: 31.64 KG/M2 | WEIGHT: 226 LBS | SYSTOLIC BLOOD PRESSURE: 134 MMHG

## 2019-04-17 DIAGNOSIS — Z12.5 SCREENING PSA (PROSTATE SPECIFIC ANTIGEN): ICD-10-CM

## 2019-04-17 DIAGNOSIS — M54.6 CHRONIC RIGHT-SIDED THORACIC BACK PAIN: ICD-10-CM

## 2019-04-17 DIAGNOSIS — G89.29 CHRONIC RIGHT-SIDED THORACIC BACK PAIN: ICD-10-CM

## 2019-04-17 DIAGNOSIS — I10 HTN (HYPERTENSION), BENIGN: Chronic | ICD-10-CM

## 2019-04-17 DIAGNOSIS — G56.03 BILATERAL CARPAL TUNNEL SYNDROME: ICD-10-CM

## 2019-04-17 DIAGNOSIS — E78.5 DYSLIPIDEMIA: Chronic | ICD-10-CM

## 2019-04-17 PROCEDURE — 99214 OFFICE O/P EST MOD 30 MIN: CPT | Performed by: INTERNAL MEDICINE

## 2019-04-17 RX ORDER — OMEPRAZOLE 20 MG/1
20 CAPSULE, DELAYED RELEASE ORAL DAILY
COMMUNITY
End: 2020-02-24 | Stop reason: SDUPTHER

## 2019-04-17 NOTE — PROGRESS NOTES
CC: Back pain, hand numbness, hypertension.    HPI:   Dean presents today with the following.    1. Chronic right-sided thoracic back pain  First complaint is intermittent back pain.  Located in the thoracic region 1 out of 10 in intensity today can be up to 7 out of 10.  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  It is improving in nature.  He has had this before in the past related to his low back.    2.  Hand numbness  Hand numbness and pain only when he sleeps.  He will wake up in the middle night with the numbness affecting the middle parts of his fingers that improves with shaking it at the hands.  No neck pain no weakness in the extremity no other signs of numbness or weakness other parts of his body.    3. HTN (hypertension), benign  Slightly elevated today he is not monitoring regularly          Patient Active Problem List    Diagnosis Date Noted   • Obesity (BMI 30-39.9) 10/22/2018   • Non morbid obesity due to excess calories 01/30/2017   • HTN (hypertension), benign 01/05/2012   • Dyslipidemia 01/05/2012   • Depression 01/05/2012   • BPH (benign prostatic hyperplasia) 01/05/2012   • ADD (attention deficit disorder) 01/05/2012   • ED (erectile dysfunction) 01/05/2012       Current Outpatient Prescriptions   Medication Sig Dispense Refill   • omeprazole (PRILOSEC) 20 MG delayed-release capsule Take 20 mg by mouth every day.     • amlodipine-benazepril (LOTREL) 5-20 MG per capsule Take 1 Cap by mouth every day. 90 Cap 3   • atorvastatin (LIPITOR) 10 MG Tab Take 1 Tab by mouth every day. 90 Tab 3   • sertraline (ZOLOFT) 100 MG Tab Take 2 Tabs by mouth every day. 180 Tab 3   • sildenafil citrate (VIAGRA) 100 MG tablet Take 1 Tab by mouth as needed for Erectile Dysfunction. 10 Tab 3     No current facility-administered medications for this visit.          Allergies as of 04/17/2019   • (No Known Allergies)        ROS: Denies Chest pain, SOB, LE edema.    /70  "(BP Location: Left arm, Patient Position: Sitting)   Pulse (!) 104   Temp 36.6 °C (97.8 °F)   Ht 1.8 m (5' 10.87\")   Wt 102.5 kg (226 lb)   SpO2 98%   BMI 31.64 kg/m²     Physical Exam:  Gen:         Alert and oriented, No apparent distress.  Neck:        No Lymphadenopathy or Bruits.  Lungs:     Clear to auscultation bilaterally  CV:          Regular rate and rhythm. No murmurs, rubs or gallops.               Ext:          No clubbing, cyanosis, edema.      Assessment and Plan.   64 y.o. male with the following issues.    1. Chronic right-sided thoracic back pain  Muscular in nature have recommended physical therapy is holding off for now.    2. Bilateral carpal tunnel syndrome  Consistent with carpal tunnel have recommended wrist splints if not improving will follow-up.    3. HTN (hypertension), benign  Discussion about monitoring blood pressure watching salt content.    4. Dyslipidemia  Recheck next lab draw.  - Comp Metabolic Panel; Future  - Lipid Profile; Future    5. Screening PSA (prostate specific antigen)    - PROSTATE SPECIFIC AG SCREENING; Future      "

## 2019-07-18 ENCOUNTER — OFFICE VISIT (OUTPATIENT)
Dept: MEDICAL GROUP | Facility: MEDICAL CENTER | Age: 64
End: 2019-07-18
Payer: MEDICARE

## 2019-07-18 VITALS
DIASTOLIC BLOOD PRESSURE: 82 MMHG | TEMPERATURE: 97.8 F | BODY MASS INDEX: 31.5 KG/M2 | SYSTOLIC BLOOD PRESSURE: 126 MMHG | HEIGHT: 71 IN | OXYGEN SATURATION: 94 % | HEART RATE: 81 BPM | WEIGHT: 225 LBS

## 2019-07-18 DIAGNOSIS — Z11.59 NEED FOR HEPATITIS C SCREENING TEST: ICD-10-CM

## 2019-07-18 DIAGNOSIS — R10.9 FLANK PAIN: ICD-10-CM

## 2019-07-18 PROCEDURE — 99214 OFFICE O/P EST MOD 30 MIN: CPT | Performed by: INTERNAL MEDICINE

## 2019-07-18 NOTE — PROGRESS NOTES
CC: Flank pain                                                                                                                                      HPI:   Dean presents today with the following.    1. Flank pain  Presents complaining of right-sided flank pain intermittently for the last 6 months.  He reports it will come and go over a series of days but is somewhat persistent.  Denies any certain relationships to meals.  He does report inactivity makes it worse.  No radiation down the leg.  Pain can be 6 out of 10 in intensity when severe.  Denies any nausea or vomiting.  No blood in his urine or increased frequency.  Denies any relationship to meals no blood in stool no dark tarry stool.    2. Need for hepatitis C screening test        Patient Active Problem List    Diagnosis Date Noted   • Obesity (BMI 30-39.9) 10/22/2018   • Non morbid obesity due to excess calories 01/30/2017   • HTN (hypertension), benign 01/05/2012   • Dyslipidemia 01/05/2012   • Depression 01/05/2012   • BPH (benign prostatic hyperplasia) 01/05/2012   • ADD (attention deficit disorder) 01/05/2012   • ED (erectile dysfunction) 01/05/2012       Current Outpatient Prescriptions   Medication Sig Dispense Refill   • omeprazole (PRILOSEC) 20 MG delayed-release capsule Take 20 mg by mouth every day.     • amlodipine-benazepril (LOTREL) 5-20 MG per capsule Take 1 Cap by mouth every day. 90 Cap 3   • atorvastatin (LIPITOR) 10 MG Tab Take 1 Tab by mouth every day. 90 Tab 3   • sertraline (ZOLOFT) 100 MG Tab Take 2 Tabs by mouth every day. 180 Tab 3   • sildenafil citrate (VIAGRA) 100 MG tablet Take 1 Tab by mouth as needed for Erectile Dysfunction. 10 Tab 3     No current facility-administered medications for this visit.          Allergies as of 07/18/2019   • (No Known Allergies)        ROS: Denies Chest pain, SOB, LE edema.    /82 (BP Location: Left arm, Patient Position: Sitting)   Pulse 81   Temp 36.6 °C (97.8 °F)   Ht 1.8 m (5'  "10.87\")   Wt 102.1 kg (225 lb)   SpO2 94%   BMI 31.50 kg/m²     Physical Exam:  Gen:         Alert and oriented, No apparent distress.  Neck:        No Lymphadenopathy or Bruits.  Lungs:     Clear to auscultation bilaterally  CV:          Regular rate and rhythm. No murmurs, rubs or gallops.     ABD:      Soft non tender, non distended. Normal active bowel sounds.  No  Hepatosplenomegaly, No pulsatile masses.                Ext:          No clubbing, cyanosis, edema.      Assessment and Plan.   64 y.o. male with the following issues.    1. Flank pain  Likely related to musculoskeletal he is concern for other pathology have written for ultrasound.  We will follow-up for abnormalities.  Discussion about testing being negative pain persists would recommend physical therapy as a first step.  Have recommended topical anti-inflammatory.  - US-ABDOMEN COMPLETE SURVEY; Future    2. Need for hepatitis C screening test    - HEP C VIRUS ANTIBODY; Future      "

## 2019-10-22 ENCOUNTER — HOSPITAL ENCOUNTER (OUTPATIENT)
Dept: RADIOLOGY | Facility: MEDICAL CENTER | Age: 64
End: 2019-10-22
Attending: INTERNAL MEDICINE
Payer: MEDICARE

## 2019-10-22 ENCOUNTER — HOSPITAL ENCOUNTER (OUTPATIENT)
Dept: LAB | Facility: MEDICAL CENTER | Age: 64
End: 2019-10-22
Attending: INTERNAL MEDICINE
Payer: MEDICARE

## 2019-10-22 DIAGNOSIS — R10.9 FLANK PAIN: ICD-10-CM

## 2019-10-22 DIAGNOSIS — E78.5 DYSLIPIDEMIA: Chronic | ICD-10-CM

## 2019-10-22 DIAGNOSIS — Z11.59 NEED FOR HEPATITIS C SCREENING TEST: ICD-10-CM

## 2019-10-22 DIAGNOSIS — Z12.5 SCREENING PSA (PROSTATE SPECIFIC ANTIGEN): ICD-10-CM

## 2019-10-22 LAB
ALBUMIN SERPL BCP-MCNC: 4.5 G/DL (ref 3.2–4.9)
ALBUMIN/GLOB SERPL: 1.9 G/DL
ALP SERPL-CCNC: 54 U/L (ref 30–99)
ALT SERPL-CCNC: 28 U/L (ref 2–50)
ANION GAP SERPL CALC-SCNC: 8 MMOL/L (ref 0–11.9)
AST SERPL-CCNC: 21 U/L (ref 12–45)
BILIRUB SERPL-MCNC: 0.8 MG/DL (ref 0.1–1.5)
BUN SERPL-MCNC: 15 MG/DL (ref 8–22)
CALCIUM SERPL-MCNC: 9.6 MG/DL (ref 8.5–10.5)
CHLORIDE SERPL-SCNC: 102 MMOL/L (ref 96–112)
CHOLEST SERPL-MCNC: 196 MG/DL (ref 100–199)
CO2 SERPL-SCNC: 27 MMOL/L (ref 20–33)
CREAT SERPL-MCNC: 0.76 MG/DL (ref 0.5–1.4)
FASTING STATUS PATIENT QL REPORTED: NORMAL
GLOBULIN SER CALC-MCNC: 2.4 G/DL (ref 1.9–3.5)
GLUCOSE SERPL-MCNC: 98 MG/DL (ref 65–99)
HDLC SERPL-MCNC: 86 MG/DL
LDLC SERPL CALC-MCNC: 97 MG/DL
POTASSIUM SERPL-SCNC: 4.5 MMOL/L (ref 3.6–5.5)
PROT SERPL-MCNC: 6.9 G/DL (ref 6–8.2)
SODIUM SERPL-SCNC: 137 MMOL/L (ref 135–145)
TRIGL SERPL-MCNC: 67 MG/DL (ref 0–149)

## 2019-10-22 PROCEDURE — 76700 US EXAM ABDOM COMPLETE: CPT

## 2019-10-22 PROCEDURE — 36415 COLL VENOUS BLD VENIPUNCTURE: CPT

## 2019-10-22 PROCEDURE — 80061 LIPID PANEL: CPT

## 2019-10-22 PROCEDURE — 86803 HEPATITIS C AB TEST: CPT

## 2019-10-22 PROCEDURE — 84153 ASSAY OF PSA TOTAL: CPT | Mod: GA

## 2019-10-22 PROCEDURE — 80053 COMPREHEN METABOLIC PANEL: CPT

## 2019-10-22 RX ORDER — ATORVASTATIN CALCIUM 10 MG/1
10 TABLET, FILM COATED ORAL DAILY
Qty: 90 TAB | Refills: 3 | Status: SHIPPED | OUTPATIENT
Start: 2019-10-22 | End: 2019-10-29 | Stop reason: SDUPTHER

## 2019-10-22 RX ORDER — SERTRALINE HYDROCHLORIDE 100 MG/1
200 TABLET, FILM COATED ORAL DAILY
Qty: 180 TAB | Refills: 3 | Status: SHIPPED | OUTPATIENT
Start: 2019-10-22 | End: 2019-10-29 | Stop reason: SDUPTHER

## 2019-10-23 LAB
HCV AB SER QL: NEGATIVE
PSA SERPL-MCNC: 3.47 NG/ML (ref 0–4)

## 2019-10-28 PROBLEM — E66.9 OBESITY (BMI 30-39.9): Status: RESOLVED | Noted: 2018-10-22 | Resolved: 2019-10-28

## 2019-10-29 ENCOUNTER — OFFICE VISIT (OUTPATIENT)
Dept: MEDICAL GROUP | Facility: MEDICAL CENTER | Age: 64
End: 2019-10-29
Payer: MEDICARE

## 2019-10-29 VITALS
TEMPERATURE: 97.1 F | DIASTOLIC BLOOD PRESSURE: 66 MMHG | HEIGHT: 71 IN | WEIGHT: 219 LBS | SYSTOLIC BLOOD PRESSURE: 126 MMHG | OXYGEN SATURATION: 100 % | HEART RATE: 76 BPM | BODY MASS INDEX: 30.66 KG/M2

## 2019-10-29 DIAGNOSIS — N52.01 ERECTILE DYSFUNCTION DUE TO ARTERIAL INSUFFICIENCY: Chronic | ICD-10-CM

## 2019-10-29 DIAGNOSIS — I10 HTN (HYPERTENSION), BENIGN: Chronic | ICD-10-CM

## 2019-10-29 DIAGNOSIS — R35.0 BENIGN PROSTATIC HYPERPLASIA WITH URINARY FREQUENCY: Chronic | ICD-10-CM

## 2019-10-29 DIAGNOSIS — F33.1 MODERATE EPISODE OF RECURRENT MAJOR DEPRESSIVE DISORDER (HCC): Chronic | ICD-10-CM

## 2019-10-29 DIAGNOSIS — E78.5 DYSLIPIDEMIA: Chronic | ICD-10-CM

## 2019-10-29 DIAGNOSIS — E66.09 NON MORBID OBESITY DUE TO EXCESS CALORIES: ICD-10-CM

## 2019-10-29 DIAGNOSIS — Z00.00 MEDICARE ANNUAL WELLNESS VISIT, SUBSEQUENT: ICD-10-CM

## 2019-10-29 DIAGNOSIS — F98.8 ATTENTION DEFICIT DISORDER (ADD) WITHOUT HYPERACTIVITY: Chronic | ICD-10-CM

## 2019-10-29 DIAGNOSIS — N40.1 BENIGN PROSTATIC HYPERPLASIA WITH URINARY FREQUENCY: Chronic | ICD-10-CM

## 2019-10-29 DIAGNOSIS — R97.20 ELEVATED PSA: ICD-10-CM

## 2019-10-29 PROCEDURE — 99213 OFFICE O/P EST LOW 20 MIN: CPT | Mod: 25 | Performed by: INTERNAL MEDICINE

## 2019-10-29 PROCEDURE — G0439 PPPS, SUBSEQ VISIT: HCPCS | Performed by: INTERNAL MEDICINE

## 2019-10-29 RX ORDER — ATORVASTATIN CALCIUM 10 MG/1
10 TABLET, FILM COATED ORAL DAILY
Qty: 90 TAB | Refills: 3 | Status: SHIPPED | OUTPATIENT
Start: 2019-10-29 | End: 2020-02-24 | Stop reason: SDUPTHER

## 2019-10-29 RX ORDER — SERTRALINE HYDROCHLORIDE 100 MG/1
200 TABLET, FILM COATED ORAL DAILY
Qty: 180 TAB | Refills: 3 | Status: SHIPPED | OUTPATIENT
Start: 2019-10-29 | End: 2020-02-24 | Stop reason: SDUPTHER

## 2019-10-29 RX ORDER — AMLODIPINE BESYLATE AND BENAZEPRIL HYDROCHLORIDE 5; 20 MG/1; MG/1
1 CAPSULE ORAL
Qty: 90 CAP | Refills: 3 | Status: SHIPPED | OUTPATIENT
Start: 2019-10-29 | End: 2020-02-24 | Stop reason: SDUPTHER

## 2019-10-29 ASSESSMENT — ENCOUNTER SYMPTOMS: GENERAL WELL-BEING: GOOD

## 2019-10-29 ASSESSMENT — PATIENT HEALTH QUESTIONNAIRE - PHQ9: CLINICAL INTERPRETATION OF PHQ2 SCORE: 0

## 2019-10-29 ASSESSMENT — ACTIVITIES OF DAILY LIVING (ADL): BATHING_REQUIRES_ASSISTANCE: 0

## 2019-10-29 NOTE — PROGRESS NOTES
CC: Follow-up elevated PSA due for wellness examination.    HPI:   Dean presents today with the following.      1. Medicare annual wellness visit, subsequent  Screenings performed below information given on advanced directives    2. Elevated PSA  Presents with some mild urinary symptoms of BPH nothing overwhelming.  PSA recently checked found to be at 3.47.  Again no major progressive and urinary symptoms since last visit.  Denies any significant bony pain issues no other complaints relating.      Information for advance directives given to patient or instructed to bring in advance directives into to office to put in chart.      Depression Screening    Little interest or pleasure in doing things?  0 - not at all  Feeling down, depressed , or hopeless? 0 - not at all  Patient Health Questionnaire Score: 0     If depressive symptoms identified deferred to follow up visit unless specifically addressed in assessment and plan.    Interpretation of PHQ-9 Total Score   Score Severity   1-4 No Depression   5-9 Mild Depression   10-14 Moderate Depression   15-19 Moderately Severe Depression   20-27 Severe Depression    Screening for Cognitive Impairment    Three Minute Recall (village, kitchen, baby) 3/3    [place]awyer clock face with all 12 numbers and set the hands to show 10 past 10.  Yes 5/5  Cognitive concerns identified deferred for follow up unless specifically addressed in assessment and plan.    Fall Risk Assessment    Has the patient had two or more falls in the last year or any fall with injury in the last year?  No    Safety Assessment    Throw rugs on floor.  Yes  Handrails on all stairs.  Yes  Good lighting in all hallways.  Yes  Difficulty hearing.  No  Patient counseled about all safety risks that were identified.    Functional Assessment ADLs    Are there any barriers preventing you from cooking for yourself or meeting nutritional needs?  No.    Are there any barriers preventing you from driving safely or  obtaining transportation?  No.    Are there any barriers preventing you from using a telephone or calling for help?  No.    Are there any barriers preventing you from shopping?  No.    Are there any barriers preventing you from taking care of your own finances?  No.    Are there any barriers preventing you from managing your medications?  No.    Are there any barriers preventing you from showering, bathing or dressing yourself?  No.    Are you currently engaging in any exercise or physical activity?  No.     What is your perception of your health?  Good.      Health Maintenance Summary                IMM PNEUMOCOCCAL VACCINE: 65+ Years Overdue 10/22/2019      Done 10/22/2018 Imm Admin: Pneumococcal Conjugate Vaccine (Prevnar/PCV-13)    Annual Wellness Visit Overdue 10/24/2019      Done 10/23/2018     IMM ZOSTER VACCINES Postponed 10/21/2032 Originally 3/10/2003. Insurance/Financial    IMM DTaP/Tdap/Td Vaccine Next Due 1/21/2023      Done 1/21/2013 Imm Admin: Tdap Vaccine    COLONOSCOPY Next Due 12/14/2028      Done 12/14/2018 REFERRAL TO GI FOR COLONOSCOPY          Patient Care Team:  Jose Sanchez M.D. as PCP - General  Cori MCNULTY DDS            Health Care Screening: Age-appropriate preventive services that Medicare covers were discussed today and ordered as indicated and agreed upon by the patient, and as recommended by USPTF and ACIP.     Patient Active Problem List    Diagnosis Date Noted   • Non morbid obesity due to excess calories 01/30/2017   • HTN (hypertension), benign 01/05/2012   • Dyslipidemia 01/05/2012   • Depression 01/05/2012   • BPH (benign prostatic hyperplasia) 01/05/2012   • ADD (attention deficit disorder) 01/05/2012   • ED (erectile dysfunction) 01/05/2012       Current Outpatient Medications   Medication Sig Dispense Refill   • amlodipine-benazepril (LOTREL) 5-20 MG per capsule Take 1 Cap by mouth every day. 90 Cap 3   • atorvastatin (LIPITOR) 10 MG Tab Take 1 Tab by mouth every day.  90 Tab 3   • sertraline (ZOLOFT) 100 MG Tab Take 2 Tabs by mouth every day. 180 Tab 3   • omeprazole (PRILOSEC) 20 MG delayed-release capsule Take 20 mg by mouth every day.     • sildenafil citrate (VIAGRA) 100 MG tablet Take 1 Tab by mouth as needed for Erectile Dysfunction. 10 Tab 3     No current facility-administered medications for this visit.        Family History   Problem Relation Age of Onset   • Heart Disease Mother    • Diabetes Mother         Type 2   • Cancer Father         skin   • Other Father         liver cirrhosis   • Diabetes Sister    • Hypertension Sister    • Diabetes Sister    • Hypertension Sister    • Other Sister         Kidney failure       Social History     Socioeconomic History   • Marital status:      Spouse name: Not on file   • Number of children: Not on file   • Years of education: Not on file   • Highest education level: Not on file   Occupational History   • Not on file   Social Needs   • Financial resource strain: Not on file   • Food insecurity:     Worry: Not on file     Inability: Not on file   • Transportation needs:     Medical: Not on file     Non-medical: Not on file   Tobacco Use   • Smoking status: Never Smoker   • Smokeless tobacco: Never Used   Substance and Sexual Activity   • Alcohol use: Yes     Alcohol/week: 6.0 oz     Types: 12 Glasses of wine per week   • Drug use: No   • Sexual activity: Yes     Partners: Female   Lifestyle   • Physical activity:     Days per week: Not on file     Minutes per session: Not on file   • Stress: Not on file   Relationships   • Social connections:     Talks on phone: Not on file     Gets together: Not on file     Attends [employer] service: Not on file     Active member of club or organization: Not on file     Attends meetings of clubs or organizations: Not on file     Relationship status: Not on file   • Intimate partner violence:     Fear of current or ex partner: Not on file     Emotionally abused: Not on file     Physically  "abused: Not on file     Forced sexual activity: Not on file   Other Topics Concern   • Not on file   Social History Narrative   • Not on file       Past Surgical History:   Procedure Laterality Date   • LAMINOTOMY         Allergies as of 10/29/2019   • (No Known Allergies)        ROS: Denies Chest pain, SOB, LE edema.    /66 (BP Location: Right arm, Patient Position: Sitting)   Pulse 76   Temp 36.2 °C (97.1 °F)   Ht 1.8 m (5' 10.87\")   Wt 99.3 kg (219 lb)   SpO2 100%   BMI 30.66 kg/m²     Physical Exam:  Gen:         Alert and oriented, No apparent distress.  Neck:        No Lymphadenopathy or Bruits.  Lungs:     Clear to auscultation bilaterally  CV:          Regular rate and rhythm. No murmurs, rubs or gallops.  Abd:         Soft non tender, non distended. Normal active bowel sounds.  No  Hepatosplenomegaly, No pulsatile masses.                   Ext:          No clubbing, cyanosis, edema.      Assessment and Plan.   64 y.o. male with the following issues.    1. Medicare annual wellness visit, subsequent  Discussed healthy lifestyle habits as well as screening regimens.  - Subsequent Annual Wellness Visit - Includes PPPS ()    2. Elevated PSA  Given the degree of elevation have referred to urology for monitoring and surveillance will likely go above 4 at some point to deserve further in-depth look regardless.  - REFERRAL TO UROLOGY    3. Dyslipidemia  Lipids currently well controlled. Discussed continued diet and exercise recheck 6 months to 1 year.  - Subsequent Annual Wellness Visit - Includes PPPS ()  - atorvastatin (LIPITOR) 10 MG Tab; Take 1 Tab by mouth every day.  Dispense: 90 Tab; Refill: 3    4. Benign prostatic hyperplasia with urinary frequency  Stable  - Subsequent Annual Wellness Visit - Includes PPPS ()    5. HTN (hypertension), benign  Currently well controlled, Discuss diet, exercise and salt restriction.  No change to medication therapy.  - [facility] Well[facility]  - amlodipine-benazepril (LOTREL) 5-20 MG per capsule; Take 1 Cap by mouth every day.  Dispense: 90 Cap; Refill: 3    6. Moderate episode of recurrent major depressive disorder (HCC)  Clinically doing well  - Subsequent Annual Wellness Visit - Includes PPPS ()    7. Non morbid obesity due to excess calories  [place] about weight loss  - Patient identified as having weight management issue.  Appropriate orders and counseling given.  - Subsequent Annual Wellness Visit - Includes PPPS ()    8. Erectile dysfunction due to arterial insufficiency  Stable  - Subsequent Annual Wellness Visit - Includes PPPS ()    9. Attention deficit disorder (ADD) without hyperactivity  Maintain on Zoloft stable.  - Subsequent Annual Wellness Visit - Includes PPPS ()        Referrals offered: Community-based lifestyle interventions to reduce health risks and promote self-management and wellness, fall prevention, nutrition, physical activity, tobacco-use cessation, weight loss, and mental health services as per orders if indicated.    Discussion today about general wellness and lifestyle habits:    · Prevent falls and reduce trip hazards; Cautioned about securing or removing rugs.  · Have a working fire alarm and carbon monoxide detector;   · Engage in regular physical activity and social activities

## 2020-01-27 DIAGNOSIS — N52.01 ERECTILE DYSFUNCTION DUE TO ARTERIAL INSUFFICIENCY: Chronic | ICD-10-CM

## 2020-01-27 RX ORDER — SILDENAFIL 100 MG/1
100 TABLET, FILM COATED ORAL PRN
Qty: 10 TAB | Refills: 3 | Status: SHIPPED | OUTPATIENT
Start: 2020-01-27 | End: 2021-07-23

## 2020-02-24 DIAGNOSIS — E78.5 DYSLIPIDEMIA: Chronic | ICD-10-CM

## 2020-02-24 DIAGNOSIS — I10 HTN (HYPERTENSION), BENIGN: Chronic | ICD-10-CM

## 2020-02-24 RX ORDER — AMLODIPINE BESYLATE AND BENAZEPRIL HYDROCHLORIDE 5; 20 MG/1; MG/1
1 CAPSULE ORAL
Qty: 90 CAP | Refills: 3 | Status: SHIPPED | OUTPATIENT
Start: 2020-02-24 | End: 2020-02-26 | Stop reason: SDUPTHER

## 2020-02-24 RX ORDER — SERTRALINE HYDROCHLORIDE 100 MG/1
200 TABLET, FILM COATED ORAL DAILY
Qty: 180 TAB | Refills: 3 | Status: SHIPPED | OUTPATIENT
Start: 2020-02-24 | End: 2020-02-26 | Stop reason: SDUPTHER

## 2020-02-24 RX ORDER — ATORVASTATIN CALCIUM 10 MG/1
10 TABLET, FILM COATED ORAL DAILY
Qty: 90 TAB | Refills: 3 | Status: SHIPPED | OUTPATIENT
Start: 2020-02-24 | End: 2020-02-26 | Stop reason: SDUPTHER

## 2020-02-24 RX ORDER — OMEPRAZOLE 20 MG/1
20 CAPSULE, DELAYED RELEASE ORAL DAILY
Qty: 90 CAP | Refills: 3 | Status: SHIPPED | OUTPATIENT
Start: 2020-02-24 | End: 2020-02-26 | Stop reason: SDUPTHER

## 2020-02-26 DIAGNOSIS — I10 HTN (HYPERTENSION), BENIGN: Chronic | ICD-10-CM

## 2020-02-26 DIAGNOSIS — E78.5 DYSLIPIDEMIA: Chronic | ICD-10-CM

## 2020-02-26 RX ORDER — OMEPRAZOLE 20 MG/1
20 CAPSULE, DELAYED RELEASE ORAL DAILY
Qty: 90 CAP | Refills: 3 | Status: SHIPPED | OUTPATIENT
Start: 2020-02-26

## 2020-02-26 RX ORDER — SERTRALINE HYDROCHLORIDE 100 MG/1
200 TABLET, FILM COATED ORAL DAILY
Qty: 180 TAB | Refills: 3 | Status: SHIPPED | OUTPATIENT
Start: 2020-02-26 | End: 2021-01-22 | Stop reason: SDUPTHER

## 2020-02-26 RX ORDER — ATORVASTATIN CALCIUM 10 MG/1
10 TABLET, FILM COATED ORAL DAILY
Qty: 90 TAB | Refills: 3 | Status: SHIPPED | OUTPATIENT
Start: 2020-02-26 | End: 2020-10-26

## 2020-02-26 RX ORDER — AMLODIPINE BESYLATE AND BENAZEPRIL HYDROCHLORIDE 5; 20 MG/1; MG/1
1 CAPSULE ORAL
Qty: 90 CAP | Refills: 3 | Status: SHIPPED | OUTPATIENT
Start: 2020-02-26 | End: 2021-01-25 | Stop reason: SDUPTHER

## 2020-04-24 ENCOUNTER — HOSPITAL ENCOUNTER (OUTPATIENT)
Dept: LAB | Facility: MEDICAL CENTER | Age: 65
End: 2020-04-24
Attending: UROLOGY
Payer: MEDICARE

## 2020-04-24 LAB — PSA SERPL-MCNC: 2.01 NG/ML (ref 0–4)

## 2020-04-24 PROCEDURE — 84153 ASSAY OF PSA TOTAL: CPT | Mod: GA

## 2020-04-24 PROCEDURE — 36415 COLL VENOUS BLD VENIPUNCTURE: CPT | Mod: GA

## 2020-10-15 ENCOUNTER — NON-PROVIDER VISIT (OUTPATIENT)
Dept: MEDICAL GROUP | Facility: MEDICAL CENTER | Age: 65
End: 2020-10-15
Payer: MEDICARE

## 2020-10-15 DIAGNOSIS — Z23 NEED FOR VACCINATION: ICD-10-CM

## 2020-10-15 PROCEDURE — 90662 IIV NO PRSV INCREASED AG IM: CPT | Performed by: FAMILY MEDICINE

## 2020-10-15 PROCEDURE — G0008 ADMIN INFLUENZA VIRUS VAC: HCPCS | Performed by: FAMILY MEDICINE

## 2020-10-15 NOTE — PROGRESS NOTES
"Dean Nobles is a 65 y.o. male here for a non-provider visit for:   FLU    Reason for immunization: Annual Flu Vaccine  Immunization records indicate need for vaccine: Yes, confirmed with [place] [facility]  Minimum interval has been met for this vaccine: Yes  ABN completed: No    Order and dose verified by: CAMILLE  VIS Dated 8/15/2019 was given to patient: Yes  All IAC Questionnaire questions were answered \"No.\"    Patient tolerated injection and no adverse effects were observed or reported: Yes    Pt scheduled for next dose in series: No  "

## 2020-10-16 ENCOUNTER — OFFICE VISIT (OUTPATIENT)
Dept: MEDICAL GROUP | Facility: MEDICAL CENTER | Age: 65
End: 2020-10-16
Payer: MEDICARE

## 2020-10-16 VITALS
BODY MASS INDEX: 31.69 KG/M2 | HEIGHT: 71 IN | TEMPERATURE: 97.7 F | DIASTOLIC BLOOD PRESSURE: 70 MMHG | WEIGHT: 226.4 LBS | OXYGEN SATURATION: 93 % | RESPIRATION RATE: 16 BRPM | HEART RATE: 69 BPM | SYSTOLIC BLOOD PRESSURE: 114 MMHG

## 2020-10-16 DIAGNOSIS — E78.5 DYSLIPIDEMIA: Chronic | ICD-10-CM

## 2020-10-16 DIAGNOSIS — F33.1 MODERATE EPISODE OF RECURRENT MAJOR DEPRESSIVE DISORDER (HCC): Chronic | ICD-10-CM

## 2020-10-16 DIAGNOSIS — I10 HTN (HYPERTENSION), BENIGN: Chronic | ICD-10-CM

## 2020-10-16 DIAGNOSIS — Z12.5 PROSTATE CANCER SCREENING: ICD-10-CM

## 2020-10-16 DIAGNOSIS — K22.719 BARRETT'S ESOPHAGUS WITH DYSPLASIA: ICD-10-CM

## 2020-10-16 PROBLEM — K22.70 BARRETT'S ESOPHAGUS: Status: ACTIVE | Noted: 2020-10-16

## 2020-10-16 PROCEDURE — 99204 OFFICE O/P NEW MOD 45 MIN: CPT | Performed by: FAMILY MEDICINE

## 2020-10-16 RX ORDER — BUPROPION HYDROCHLORIDE 150 MG/1
150 TABLET ORAL EVERY MORNING
Qty: 30 TAB | Refills: 2 | Status: SHIPPED | OUTPATIENT
Start: 2020-10-16 | End: 2020-10-21 | Stop reason: SDUPTHER

## 2020-10-16 ASSESSMENT — PATIENT HEALTH QUESTIONNAIRE - PHQ9
8. MOVING OR SPEAKING SO SLOWLY THAT OTHER PEOPLE COULD HAVE NOTICED. OR THE OPPOSITE, BEING SO FIGETY OR RESTLESS THAT YOU HAVE BEEN MOVING AROUND A LOT MORE THAN USUAL: NOT AT ALL
3. TROUBLE FALLING OR STAYING ASLEEP OR SLEEPING TOO MUCH: NOT AT ALL
6. FEELING BAD ABOUT YOURSELF - OR THAT YOU ARE A FAILURE OR HAVE LET YOURSELF OR YOUR FAMILY DOWN: MORE THAN HALF THE DAYS
9. THOUGHTS THAT YOU WOULD BE BETTER OFF DEAD, OR OF HURTING YOURSELF: SEVERAL DAYS
SUM OF ALL RESPONSES TO PHQ QUESTIONS 1-9: 10
2. FEELING DOWN, DEPRESSED, IRRITABLE, OR HOPELESS: SEVERAL DAYS
7. TROUBLE CONCENTRATING ON THINGS, SUCH AS READING THE NEWSPAPER OR WATCHING TELEVISION: NEARLY EVERY DAY
5. POOR APPETITE OR OVEREATING: SEVERAL DAYS
1. LITTLE INTEREST OR PLEASURE IN DOING THINGS: SEVERAL DAYS
SUM OF ALL RESPONSES TO PHQ9 QUESTIONS 1 AND 2: 2
4. FEELING TIRED OR HAVING LITTLE ENERGY: SEVERAL DAYS

## 2020-10-16 NOTE — ASSESSMENT & PLAN NOTE
This is a chronic problem.  He had an EGD completed with [facility] 12/28/20.  Was recommended he return in 5 years for repeat EGD.  He reports symptoms are well controlled with omeprazole 20 mg daily. Patient denies dysphagia, odynophagia, globus, choking, melena, hematochezia, or unintentional weight loss.

## 2020-10-17 NOTE — PROGRESS NOTES
Subjective:     CC:  Diagnoses of Pro's esophagus with dysplasia, Moderate episode of recurrent major depressive disorder (HCC), Dyslipidemia, HTN (hypertension), benign, and Prostate cancer screening were pertinent to this visit.    HISTORY OF THE PRESENT ILLNESS: Patient is a 65 y.o. male. He is here today to establish care.  He is a retired casino .  He has 3 children and 9 grandchildren.  He is up-to-date on his colonoscopy.  He is due for PSA screening given his h/o elevated PSA.  Prior PCP: Dr. Sanchez.    Pro's esophagus  This is a chronic problem.  He had an EGD completed with [facility] 12/28/20.  Was recommended he return in 5 years for repeat EGD.  He reports symptoms are well controlled with omeprazole 20 mg daily. Patient denies dysphagia, odynophagia, globus, choking, melena, hematochezia, or unintentional weight loss.      Moderate episode of recurrent major depressive disorder (HCC)  This is a chronic problem.  The patient reports his symptoms are somewhat stable on Zoloft 200 mg daily which he has been on for over 10 years.  He is interested in starting Wellbutrin as his symptoms are not completely treated with the Zoloft.  He would also like to reestablish with a counselor.    Depression Screening    Little interest or pleasure in doing things?   Several days  Feeling down, depressed , or hopeless?  Several days  Trouble falling or staying asleep, or sleeping too much?   Not at all  Feeling tired or having little energy?   Several days  Poor appetite or overeating?   Several days  Feeling bad about yourself - or that you are a failure or have let yourself or your family down?  More than half the days  Trouble concentrating on things, such as reading the newspaper or watching television?  Nearly every day  Moving or speaking so slowly that other people could have noticed.  Or the opposite - being so fidgety or restless that you have been moving around a lot more than  usual?   Not at all  Thoughts that you would be better off dead, or of hurting yourself?   Several days  Patient Health Questionnaire Score:  (!) 10      If depressive symptoms identified deferred to follow up visit unless specifically addressed in assesment and plan.    Interpretation of PHQ-9 Total Score   Score Severity   1-4 No Depression   5-9 Mild Depression   10-14 Moderate Depression   15-19 Moderately Severe Depression   20-27 Severe Depression    Dyslipidemia  This is a chronic problem.  The patient reports compliance with atorvastatin 10 mg nightly.     Lab Results   Component Value Date/Time    CHOLSTRLTOT 196 10/22/2019 10:20 AM    LDL 97 10/22/2019 10:20 AM    HDL 86 10/22/2019 10:20 AM    TRIGLYCERIDE 67 10/22/2019 10:20 AM             Allergies: Patient has no known allergies.    Current Outpatient Medications Ordered in Epic   Medication Sig Dispense Refill   • buPROPion (WELLBUTRIN XL) 150 MG XL tablet Take 1 Tab by mouth every morning. 30 Tab 2   • sertraline (ZOLOFT) 100 MG Tab Take 2 Tabs by mouth every day. 180 Tab 3   • omeprazole (PRILOSEC) 20 MG delayed-release capsule Take 1 Cap by mouth every day. 90 Cap 3   • atorvastatin (LIPITOR) 10 MG Tab Take 1 Tab by mouth every day. 90 Tab 3   • amlodipine-benazepril (LOTREL) 5-20 MG per capsule Take 1 Cap by mouth every day. 90 Cap 3   • sildenafil citrate (VIAGRA) 100 MG tablet Take 1 Tab by mouth as needed for Erectile Dysfunction. 10 Tab 3     No current Epic-ordered facility-administered medications on file.        Past Medical History:   Diagnosis Date   • ADD (attention deficit disorder) 1/5/2012   • BPH (benign prostatic hyperplasia) 1/5/2012   • Dyslipidemia 1/5/2012   • ED (erectile dysfunction) 1/5/2012   • HTN (hypertension), benign 1/5/2012       Past Surgical History:   Procedure Laterality Date   • LAMINOTOMY         Social History     Tobacco Use   • Smoking status: Never Smoker   • Smokeless tobacco: Never Used   Substance Use  "Topics   • Alcohol use: Yes     Alcohol/week: 6.0 oz     Types: 12 Glasses of wine per week   • Drug use: No       Social History     Social History Narrative   • Not on file       Family History   Problem Relation Age of Onset   • Heart Disease Mother    • Diabetes Mother         Type 2   • Cancer Father         skin   • Other Father         liver cirrhosis   • Diabetes Sister    • Hypertension Sister    • Heart Disease Brother    • Diabetes Sister    • Hypertension Sister    • Other Sister         Kidney failure       Health Maintenance: UTD    ROS:   Gen: no fevers/chills, no changes in weight  Eyes: no changes in vision  ENT: no sore throat or nasal congestion  Pulm: no sob, no cough  CV: no chest pain  GI: no nausea/vomiting, no diarrhea or constipation  : no dysuria  MSk: no myalgias  Skin: no rash  Neuro: no headaches, no numbness/tingling  Immuno: no seasonal allergies  Heme/Lymph: no easy bruising  Psych: no anxiety of depression      Objective:       Exam: /70 (BP Location: Left arm, Patient Position: Sitting, BP Cuff Size: Adult)   Pulse 69   Temp 36.5 °C (97.7 °F) (Temporal)   Resp 16   Ht 1.803 m (5' 11\")   Wt 102.7 kg (226 lb 6.4 oz)   SpO2 93%  Body mass index is 31.58 kg/m².    General: Obese; Well-developed, well-nourished. Appears stated age.  Eyes: Normocephalic. Conjunctiva clear without injection or scleral icterus. Pupils equal and reactive to light.   HENT: Normocephalic, atraumatic. Ears normal shape and contour, canals are clear bilaterally, tympanic membranes pearly, oropharynx is clear without erythema, edema or exudates.   Neck: Supple; no obvious thyromegaly or nodules appreciated  Pulmonary: Clear to ausculation bilaterally.  No increased work of breathing. No rales, ronchi, or wheezing.  Cardiovascular: Regular rate and rhythm without murmur.  Abdomen: Soft, nontender, nondistended. Normal bowel sounds. No guarding or rebound tenderness.  Neurologic: CN III-XII " intact.  Lymph: No cervical or supraclavicular lymphadenopathy palpated.  Skin: Warm and dry.  No obvious lesions.  Musculoskeletal: Normal gait. No extremity cyanosis, clubbing, or edema.  Psych: Euthymic affect. Alert and oriented x 3. Judgment and insight is normal.      Assessment & Plan:   65 y.o. male with the following -    1. Pro's esophagus with dysplasia  -EGD completed last year with [facility], 12/2018   -Continue daily PPI    2. Moderate episode of recurrent major depressive disorder (HCC)  - REFERRAL TO BEHAVIORAL HEALTH  - Continue zoloft 200mg daily  - Start Wellbutrin XL 150mg daily   - Follow up 1 month    3. Dyslipidemia  - Continue atorvastatin 10mg qhs  - Comp Metabolic Panel; Future  - Lipid Profile; Future    4. HTN (hypertension), benign  -Continue amlodipine-benazepril 5-20 mg daily  -Discussed recommendation for diet rich in vegetables, fruits, fiber, minimal processed/packaged foods, and 150 minutes of moderate exercise per week.  -Discussed HIP for weight and comorbidities, patient will consider  - Comp Metabolic Panel; Future  - Lipid Profile; Future  - MICROALBUMIN CREAT RATIO URINE; Future  - CBC WITH DIFFERENTIAL; Future    5. Prostate cancer screening  - PROSTATE SPECIFIC AG SCREENING; Future    Other orders  - buPROPion (WELLBUTRIN XL) 150 MG XL tablet; Take 1 Tab by mouth every morning.  Dispense: 30 Tab; Refill: 2      Return in about 1 month (around 11/16/2020).    Please note this dictation was created using voice recognition software. I have made every reasonable attempt to correct obvious errors, but I expect there may be errors of grammar, and possibly content, that I did not discover before finalizing the note.

## 2020-10-17 NOTE — ASSESSMENT & PLAN NOTE
This is a chronic problem.  The patient reports compliance with atorvastatin 10 mg nightly.     Lab Results   Component Value Date/Time    CHOLSTRLTOT 196 10/22/2019 10:20 AM    LDL 97 10/22/2019 10:20 AM    HDL 86 10/22/2019 10:20 AM    TRIGLYCERIDE 67 10/22/2019 10:20 AM

## 2020-10-17 NOTE — ASSESSMENT & PLAN NOTE
This is a chronic problem.  The patient reports his symptoms are somewhat stable on Zoloft 200 mg daily which he has been on for over 10 years.  He is interested in starting Wellbutrin as his symptoms are not completely treated with the Zoloft.  He would also like to reestablish with a counselor.    Depression Screening    Little interest or pleasure in doing things?   Several days  Feeling down, depressed , or hopeless?  Several days  Trouble falling or staying asleep, or sleeping too much?   Not at all  Feeling tired or having little energy?   Several days  Poor appetite or overeating?   Several days  Feeling bad about yourself - or that you are a failure or have let yourself or your family down?  More than half the days  Trouble concentrating on things, such as reading the newspaper or watching television?  Nearly every day  Moving or speaking so slowly that other people could have noticed.  Or the opposite - being so fidgety or restless that you have been moving around a lot more than usual?   Not at all  Thoughts that you would be better off dead, or of hurting yourself?   Several days  Patient Health Questionnaire Score:  (!) 10      If depressive symptoms identified deferred to follow up visit unless specifically addressed in assesment and plan.    Interpretation of PHQ-9 Total Score   Score Severity   1-4 No Depression   5-9 Mild Depression   10-14 Moderate Depression   15-19 Moderately Severe Depression   20-27 Severe Depression

## 2020-10-21 ENCOUNTER — PATIENT MESSAGE (OUTPATIENT)
Dept: MEDICAL GROUP | Facility: MEDICAL CENTER | Age: 65
End: 2020-10-21

## 2020-10-21 RX ORDER — BUPROPION HYDROCHLORIDE 150 MG/1
150 TABLET ORAL EVERY MORNING
Qty: 90 TAB | Refills: 2 | Status: SHIPPED | OUTPATIENT
Start: 2020-10-21 | End: 2021-02-09

## 2020-10-21 NOTE — TELEPHONE ENCOUNTER
From: Dean Nobles  To: Leora Mcconnell M.D.  Sent: 10/21/2020 9:40 AM PDT  Subject: Prescription Question    Hi  Can you send my prescription for buPROPion 150 MG XL tablet to [facility] on [address]. [facility] does not accept my insurance.  Thank you.  TARSHA

## 2020-10-25 DIAGNOSIS — E78.5 DYSLIPIDEMIA: Chronic | ICD-10-CM

## 2020-10-26 RX ORDER — ATORVASTATIN CALCIUM 10 MG/1
TABLET, FILM COATED ORAL
Qty: 90 TAB | Refills: 2 | Status: SHIPPED | OUTPATIENT
Start: 2020-10-26 | End: 2021-07-16

## 2020-11-06 ENCOUNTER — HOSPITAL ENCOUNTER (OUTPATIENT)
Dept: LAB | Facility: MEDICAL CENTER | Age: 65
End: 2020-11-06
Attending: FAMILY MEDICINE
Payer: MEDICARE

## 2020-11-06 DIAGNOSIS — I10 HTN (HYPERTENSION), BENIGN: Chronic | ICD-10-CM

## 2020-11-06 DIAGNOSIS — E78.5 DYSLIPIDEMIA: Chronic | ICD-10-CM

## 2020-11-06 DIAGNOSIS — Z12.5 PROSTATE CANCER SCREENING: ICD-10-CM

## 2020-11-06 LAB
ALBUMIN SERPL BCP-MCNC: 4.4 G/DL (ref 3.2–4.9)
ALBUMIN/GLOB SERPL: 1.8 G/DL
ALP SERPL-CCNC: 52 U/L (ref 30–99)
ALT SERPL-CCNC: 34 U/L (ref 2–50)
ANION GAP SERPL CALC-SCNC: 12 MMOL/L (ref 7–16)
AST SERPL-CCNC: 28 U/L (ref 12–45)
BASOPHILS # BLD AUTO: 0.6 % (ref 0–1.8)
BASOPHILS # BLD: 0.04 K/UL (ref 0–0.12)
BILIRUB SERPL-MCNC: 0.7 MG/DL (ref 0.1–1.5)
BUN SERPL-MCNC: 19 MG/DL (ref 8–22)
CALCIUM SERPL-MCNC: 9.4 MG/DL (ref 8.5–10.5)
CHLORIDE SERPL-SCNC: 98 MMOL/L (ref 96–112)
CHOLEST SERPL-MCNC: 200 MG/DL (ref 100–199)
CO2 SERPL-SCNC: 26 MMOL/L (ref 20–33)
CREAT SERPL-MCNC: 0.82 MG/DL (ref 0.5–1.4)
CREAT UR-MCNC: 187.38 MG/DL
EOSINOPHIL # BLD AUTO: 0.17 K/UL (ref 0–0.51)
EOSINOPHIL NFR BLD: 2.7 % (ref 0–6.9)
ERYTHROCYTE [DISTWIDTH] IN BLOOD BY AUTOMATED COUNT: 43.3 FL (ref 35.9–50)
FASTING STATUS PATIENT QL REPORTED: NORMAL
GLOBULIN SER CALC-MCNC: 2.5 G/DL (ref 1.9–3.5)
GLUCOSE SERPL-MCNC: 101 MG/DL (ref 65–99)
HCT VFR BLD AUTO: 45.3 % (ref 42–52)
HDLC SERPL-MCNC: 88 MG/DL
HGB BLD-MCNC: 15.6 G/DL (ref 14–18)
IMM GRANULOCYTES # BLD AUTO: 0.05 K/UL (ref 0–0.11)
IMM GRANULOCYTES NFR BLD AUTO: 0.8 % (ref 0–0.9)
LDLC SERPL CALC-MCNC: 97 MG/DL
LYMPHOCYTES # BLD AUTO: 1.33 K/UL (ref 1–4.8)
LYMPHOCYTES NFR BLD: 21.3 % (ref 22–41)
MCH RBC QN AUTO: 31.8 PG (ref 27–33)
MCHC RBC AUTO-ENTMCNC: 34.4 G/DL (ref 33.7–35.3)
MCV RBC AUTO: 92.4 FL (ref 81.4–97.8)
MICROALBUMIN UR-MCNC: <1.2 MG/DL
MICROALBUMIN/CREAT UR: NORMAL MG/G (ref 0–30)
MONOCYTES # BLD AUTO: 0.58 K/UL (ref 0–0.85)
MONOCYTES NFR BLD AUTO: 9.3 % (ref 0–13.4)
NEUTROPHILS # BLD AUTO: 4.06 K/UL (ref 1.82–7.42)
NEUTROPHILS NFR BLD: 65.3 % (ref 44–72)
NRBC # BLD AUTO: 0 K/UL
NRBC BLD-RTO: 0 /100 WBC
PLATELET # BLD AUTO: 241 K/UL (ref 164–446)
PMV BLD AUTO: 10.6 FL (ref 9–12.9)
POTASSIUM SERPL-SCNC: 4.5 MMOL/L (ref 3.6–5.5)
PROT SERPL-MCNC: 6.9 G/DL (ref 6–8.2)
PSA SERPL-MCNC: 2.03 NG/ML (ref 0–4)
RBC # BLD AUTO: 4.9 M/UL (ref 4.7–6.1)
SODIUM SERPL-SCNC: 136 MMOL/L (ref 135–145)
TRIGL SERPL-MCNC: 77 MG/DL (ref 0–149)
WBC # BLD AUTO: 6.2 K/UL (ref 4.8–10.8)

## 2020-11-06 PROCEDURE — 80053 COMPREHEN METABOLIC PANEL: CPT

## 2020-11-06 PROCEDURE — 36415 COLL VENOUS BLD VENIPUNCTURE: CPT

## 2020-11-06 PROCEDURE — 82570 ASSAY OF URINE CREATININE: CPT

## 2020-11-06 PROCEDURE — 84153 ASSAY OF PSA TOTAL: CPT | Mod: GA

## 2020-11-06 PROCEDURE — 80061 LIPID PANEL: CPT

## 2020-11-06 PROCEDURE — 85025 COMPLETE CBC W/AUTO DIFF WBC: CPT

## 2020-11-06 PROCEDURE — 82043 UR ALBUMIN QUANTITATIVE: CPT

## 2020-11-17 ENCOUNTER — APPOINTMENT (OUTPATIENT)
Dept: MEDICAL GROUP | Facility: MEDICAL CENTER | Age: 65
End: 2020-11-17
Payer: MEDICARE

## 2020-12-09 ENCOUNTER — TELEMEDICINE (OUTPATIENT)
Dept: MEDICAL GROUP | Facility: MEDICAL CENTER | Age: 65
End: 2020-12-09
Payer: MEDICARE

## 2020-12-09 VITALS — BODY MASS INDEX: 30.8 KG/M2 | WEIGHT: 220 LBS | HEIGHT: 71 IN

## 2020-12-09 DIAGNOSIS — I10 ESSENTIAL HYPERTENSION: ICD-10-CM

## 2020-12-09 DIAGNOSIS — F33.1 MODERATE EPISODE OF RECURRENT MAJOR DEPRESSIVE DISORDER (HCC): ICD-10-CM

## 2020-12-09 DIAGNOSIS — E78.5 DYSLIPIDEMIA: Chronic | ICD-10-CM

## 2020-12-09 PROCEDURE — 99214 OFFICE O/P EST MOD 30 MIN: CPT | Mod: 95,CR | Performed by: FAMILY MEDICINE

## 2020-12-09 SDOH — HEALTH STABILITY: MENTAL HEALTH: HOW OFTEN DO YOU HAVE A DRINK CONTAINING ALCOHOL?: 4 OR MORE TIMES A WEEK

## 2020-12-09 SDOH — HEALTH STABILITY: MENTAL HEALTH: HOW MANY STANDARD DRINKS CONTAINING ALCOHOL DO YOU HAVE ON A TYPICAL DAY?: 3 OR 4

## 2020-12-09 ASSESSMENT — FIBROSIS 4 INDEX: FIB4 SCORE: 1.33

## 2020-12-10 NOTE — PROGRESS NOTES
Telemedicine Visit: Established Patient     This evaluation was conducted via [employer] using secure and encrypted videoconferencing technology. The patient was in a private location in the state of [place].    The patient's identity was confirmed and verbal consent was obtained for this virtual visit.    Subjective:   CC: f/u depression and labs  Dean Nobles is a 65 y.o. male presenting for evaluation and management of:    Essential hypertension  This is a chronic problem.  Patient reports his blood pressures been well controlled for multiple years on amlodipine-benazepril 5-20 mg daily.  His home blood pressures are 120-130/70-80. The patient denies chest pain, shortness of breath, headaches, vision changes, lightheadedness, dizziness, or medication side effects.      Dyslipidemia  This is a chronic problem.  The patient reports compliance with atorvastatin 10 mg nightly.     Lab Results   Component Value Date/Time    CHOLSTRLTOT 200 (H) 11/06/2020 09:56 AM    LDL 97 11/06/2020 09:56 AM    HDL 88 11/06/2020 09:56 AM    TRIGLYCERIDE 77 11/06/2020 09:56 AM           Moderate episode of recurrent major depressive disorder (HCC)  This is a chronic problem.  The patient has been on Zoloft 200 mg daily for over 10 years.  We added Wellbutrin  mg daily about a month ago.  He does feel like his mood has improved slightly. He denies negative side effects. He has not scheduled an appointment with a counselor yet.      ROS   Denies any recent fevers or chills. No nausea or vomiting. No chest pains or shortness of breath.     No Known Allergies    Current medicines (including changes today)  Current Outpatient Medications   Medication Sig Dispense Refill   • atorvastatin (LIPITOR) 10 MG Tab TAKE 1 TABLET BY MOUTH EVERY DAY 90 Tab 2   • buPROPion (WELLBUTRIN XL) 150 MG XL tablet Take 1 Tab by mouth every morning. 90 Tab 2   • sertraline (ZOLOFT) 100 MG Tab Take 2 Tabs by mouth every day. 180 Tab 3   • omeprazole  "(PRILOSEC) 20 MG delayed-release capsule Take 1 Cap by mouth every day. 90 Cap 3   • amlodipine-benazepril (LOTREL) 5-20 MG per capsule Take 1 Cap by mouth every day. 90 Cap 3   • sildenafil citrate (VIAGRA) 100 MG tablet Take 1 Tab by mouth as needed for Erectile Dysfunction. 10 Tab 3     No current facility-administered medications for this visit.        Patient Active Problem List    Diagnosis Date Noted   • Pro's esophagus 10/16/2020   • Non morbid obesity due to excess calories 01/30/2017   • Essential hypertension 01/05/2012   • Dyslipidemia 01/05/2012   • Moderate episode of recurrent major depressive disorder (HCC) 01/05/2012   • BPH (benign prostatic hyperplasia) 01/05/2012   • ADD (attention deficit disorder) 01/05/2012   • ED (erectile dysfunction) 01/05/2012       Family History   Problem Relation Age of Onset   • Heart Disease Mother    • Diabetes Mother         Type 2   • Cancer Father         skin   • Other Father         liver cirrhosis   • Diabetes Sister    • Hypertension Sister    • Heart Disease Brother    • Diabetes Sister    • Hypertension Sister    • Other Sister         Kidney failure       He  has a past medical history of ADD (attention deficit disorder) (1/5/2012), BPH (benign prostatic hyperplasia) (1/5/2012), Dyslipidemia (1/5/2012), ED (erectile dysfunction) (1/5/2012), and HTN (hypertension), benign (1/5/2012).  He  has a past surgical history that includes laminotomy.       Objective:   Ht 1.803 m (5' 11\") Comment: pt. reported  Wt 99.8 kg (220 lb) Comment: pt. reported  BMI 30.68 kg/m²     Physical Exam:  Constitutional: Alert, no distress, well-groomed.  Skin: No rashes in visible areas.  Eye: Round. Conjunctiva clear, lids normal. No icterus.   ENMT: Lips pink without lesions, good dentition, moist mucous membranes. Phonation normal.  Respiratory: Unlabored respiratory effort, no cough or audible wheeze  Psych: Alert and oriented x3, normal affect and mood.       Assessment " and Plan:   The following treatment plan was discussed:     1. Essential hypertension  -Continue amlodipine-benazepril 5-20 mg daily  -Discussed the importance of diet, exercise, and weight loss    2. Dyslipidemia  -Continue atorvastatin 10 mg nightly    3. Moderate episode of recurrent major depressive disorder (HCC)  -Continue sertraline 200 mg daily  -Continue Wellbutrin 150 mg XL daily  -Encouraged patient to schedule counseling appointment  -Follow-up 2 months    Follow-up: Return in about 2 months (around 2/9/2021).

## 2021-01-22 RX ORDER — SERTRALINE HYDROCHLORIDE 100 MG/1
200 TABLET, FILM COATED ORAL DAILY
Qty: 180 TAB | Refills: 3 | Status: SHIPPED | OUTPATIENT
Start: 2021-01-22 | End: 2021-12-07

## 2021-01-22 NOTE — TELEPHONE ENCOUNTER
Received request via: Pharmacy    Was the patient seen in the last year in this department? Yes    Does the patient have an active prescription (recently filled or refills available) for medication(s) requested? No     Requested Prescriptions     Pending Prescriptions Disp Refills   • sertraline (ZOLOFT) 100 MG Tab 180 Tab 3     Sig: Take 2 Tabs by mouth every day.

## 2021-01-25 DIAGNOSIS — I10 HTN (HYPERTENSION), BENIGN: Chronic | ICD-10-CM

## 2021-01-26 RX ORDER — AMLODIPINE BESYLATE AND BENAZEPRIL HYDROCHLORIDE 5; 20 MG/1; MG/1
1 CAPSULE ORAL
Qty: 90 CAP | Refills: 3 | Status: SHIPPED | OUTPATIENT
Start: 2021-01-26 | End: 2022-01-10

## 2021-02-09 ENCOUNTER — OFFICE VISIT (OUTPATIENT)
Dept: MEDICAL GROUP | Facility: MEDICAL CENTER | Age: 66
End: 2021-02-09
Payer: MEDICARE

## 2021-02-09 VITALS
BODY MASS INDEX: 31.5 KG/M2 | DIASTOLIC BLOOD PRESSURE: 70 MMHG | HEIGHT: 71 IN | HEART RATE: 80 BPM | TEMPERATURE: 96.5 F | WEIGHT: 225 LBS | SYSTOLIC BLOOD PRESSURE: 122 MMHG | RESPIRATION RATE: 16 BRPM | OXYGEN SATURATION: 95 %

## 2021-02-09 DIAGNOSIS — I10 ESSENTIAL HYPERTENSION: ICD-10-CM

## 2021-02-09 DIAGNOSIS — R21 RASH: ICD-10-CM

## 2021-02-09 DIAGNOSIS — F33.42 RECURRENT MAJOR DEPRESSIVE DISORDER, IN FULL REMISSION (HCC): ICD-10-CM

## 2021-02-09 PROCEDURE — 99213 OFFICE O/P EST LOW 20 MIN: CPT | Performed by: FAMILY MEDICINE

## 2021-02-09 RX ORDER — TRIAMCINOLONE ACETONIDE 1 MG/G
CREAM TOPICAL
Qty: 80 G | Refills: 2 | Status: SHIPPED | OUTPATIENT
Start: 2021-02-09 | End: 2022-03-18

## 2021-02-09 ASSESSMENT — FIBROSIS 4 INDEX: FIB4 SCORE: 1.33

## 2021-02-10 NOTE — ASSESSMENT & PLAN NOTE
This is a chronic problem.  The patient has been on Zoloft 200 mg daily for over 10 years.  We added Wellbutrin  mg daily three months ago - patient initially noticed a very slight improvement however more recently does not feel medication was making a difference and stopped it.  He denies any symptoms s/p stopping medication. He reports his mood has been fairly good - he is retired and admits he needs to find new hobbies.

## 2021-02-10 NOTE — ASSESSMENT & PLAN NOTE
Patient reports an itchy rash appeared on his back on 1/29/21 after shoveling snow. He reports the rash is slowly improving although continues to be intensely pruritic at times.  He has tried lotion and over-the-counter cortisone cream with some improvement. He denies new lotions, soaps, or detergents. No environmental exposures or new medications.

## 2021-02-10 NOTE — PROGRESS NOTES
Subjective:     CC:     HPI:   Dean presents today with:    Rash  Patient reports an itchy rash appeared on his back on 1/29/21 after shoveling snow. He reports the rash is slowly improving although continues to be intensely pruritic at times.  He has tried lotion and over-the-counter cortisone cream with some improvement. He denies new lotions, soaps, or detergents. No environmental exposures or new medications.    Essential hypertension  This is a chronic problem.  Patient reports his blood pressures been well controlled for multiple years on amlodipine-benazepril 5-20 mg daily.  His home blood pressures are 120-130/70-80. The patient denies chest pain, shortness of breath, headaches, vision changes, lightheadedness, dizziness, or medication side effects.      Recurrent major depressive disorder, in full remission (HCC)  This is a chronic problem.  The patient has been on Zoloft 200 mg daily for over 10 years.  We added Wellbutrin  mg daily three months ago - patient initially noticed a very slight improvement however more recently does not feel medication was making a difference and stopped it.  He denies any symptoms s/p stopping medication. He reports his mood has been fairly good - he is retired and admits he needs to find new hobbies.      Past Medical History:   Diagnosis Date   • ADD (attention deficit disorder) 1/5/2012   • BPH (benign prostatic hyperplasia) 1/5/2012   • Dyslipidemia 1/5/2012   • ED (erectile dysfunction) 1/5/2012   • HTN (hypertension), benign 1/5/2012       Social History     Tobacco Use   • Smoking status: Former Smoker     Types: Cigarettes   • Smokeless tobacco: Never Used   • Tobacco comment: 16-30   Substance Use Topics   • Alcohol use: Yes     Alcohol/week: 6.0 oz     Types: 12 Glasses of wine per week     Frequency: 4 or more times a week     Drinks per session: 3 or 4   • Drug use: No       Current Outpatient Medications Ordered in Epic   Medication Sig Dispense Refill   •  "triamcinolone acetonide (KENALOG) 0.1 % Cream Apply thin layer to affected area BID X 14 days 80 g 2   • amlodipine-benazepril (LOTREL) 5-20 MG per capsule Take 1 Cap by mouth every day. 90 Cap 3   • sertraline (ZOLOFT) 100 MG Tab Take 2 Tabs by mouth every day. 180 Tab 3   • atorvastatin (LIPITOR) 10 MG Tab TAKE 1 TABLET BY MOUTH EVERY DAY 90 Tab 2   • omeprazole (PRILOSEC) 20 MG delayed-release capsule Take 1 Cap by mouth every day. 90 Cap 3   • sildenafil citrate (VIAGRA) 100 MG tablet Take 1 Tab by mouth as needed for Erectile Dysfunction. 10 Tab 3     No current Epic-ordered facility-administered medications on file.        Allergies:  Patient has no known allergies.    Health Maintenance: UTD    ROS:  Gen: no fevers/chills, no changes in weight  Eyes: no changes in vision  ENT: no sore throat, no hearing loss, no bloody nose  Pulm: no SOB  CV: no chest pain        Objective:       Exam:  /70 (BP Location: Left arm, Patient Position: Sitting, BP Cuff Size: Adult)   Pulse 80   Temp 35.8 °C (96.5 °F) (Temporal)   Resp 16   Ht 1.803 m (5' 11\")   Wt 102 kg (225 lb)   SpO2 95%   BMI 31.38 kg/m²  Body mass index is 31.38 kg/m².    Constitutional: Alert, no distress, well-groomed  Skin: erythematous, papular rash on neck and upper back  Eyes: Equal, round and reactive, conjunctiva clear, no ptosis  ENMT: Lips without lesions, good dentition, moist mucous membranes  Neck: Trachea midline, no obvious thyromegaly  Respiratory: Unlabored respiratory effort  Neuro: Grossly non-focal  Psych: Alert and oriented, normal affect and mood      Assessment & Plan:     65 y.o. male with the following -     1. Rash  Etiology unclear, perhaps started as heat rash? Trial of steroid cream X 14 days, if rash persists patient will make follow up appointment.   - triamcinolone acetonide (KENALOG) 0.1 % Cream; Apply thin layer to affected area BID X 14 days  Dispense: 80 g; Refill: 2    2. Essential hypertension  This is a " chronic, well-controlled problem.  -Continue amlodipine-benazepril 5-20 mg daily  -Discussed the importance of diet, exercise, and weight loss    3. Recurrent major depressive disorder, in full remission (HCC)  This is a chronic condition.  Patient reports he is doing quite well on Zoloft 200 mg daily.  He reports Wellbutrin did not improve his mood and thus stopped it.  - Continue current regimen    Return in about 1 month (around 3/9/2021) for if rash persists.    Please note this dictation was created using voice recognition software. I have made every reasonable attempt to correct obvious errors, but I expect there may be errors of grammar, and possibly content, that I did not discover before finalizing the note.

## 2021-02-10 NOTE — ASSESSMENT & PLAN NOTE
This is a chronic problem.  Patient reports his blood pressures been well controlled for multiple years on amlodipine-benazepril 5-20 mg daily.  His home blood pressures are 120-130/70-80. The patient denies chest pain, shortness of breath, headaches, vision changes, lightheadedness, dizziness, or medication side effects.

## 2021-03-03 DIAGNOSIS — Z23 NEED FOR VACCINATION: ICD-10-CM

## 2021-05-05 ENCOUNTER — HOSPITAL ENCOUNTER (OUTPATIENT)
Facility: MEDICAL CENTER | Age: 66
End: 2021-05-05
Attending: NURSE PRACTITIONER
Payer: MEDICARE

## 2021-05-05 PROCEDURE — 83630 LACTOFERRIN FECAL (QUAL): CPT

## 2021-05-05 PROCEDURE — 87493 C DIFF AMPLIFIED PROBE: CPT

## 2021-05-05 PROCEDURE — 83520 IMMUNOASSAY QUANT NOS NONAB: CPT

## 2021-05-05 PROCEDURE — 87045 FECES CULTURE AEROBIC BACT: CPT

## 2021-05-05 PROCEDURE — 87899 AGENT NOS ASSAY W/OPTIC: CPT | Mod: 91

## 2021-05-06 LAB
C DIFF DNA SPEC QL NAA+PROBE: NEGATIVE
C DIFF TOX GENS STL QL NAA+PROBE: NEGATIVE

## 2021-05-07 LAB
E COLI SXT1+2 STL IA: NORMAL
SIGNIFICANT IND 70042: NORMAL
SITE SITE: NORMAL
SOURCE SOURCE: NORMAL

## 2021-05-08 LAB
BACTERIA STL CULT: NORMAL
C JEJUNI+C COLI AG STL QL: NORMAL
E COLI SXT1+2 STL IA: NORMAL
SIGNIFICANT IND 70042: NORMAL
SITE SITE: NORMAL
SOURCE SOURCE: NORMAL

## 2021-05-09 LAB — LACTOFERRIN STL QL IA: NEGATIVE

## 2021-05-10 LAB — ELASTASE PANC STL-MCNT: >800 UG/G

## 2021-07-15 DIAGNOSIS — E78.5 DYSLIPIDEMIA: Chronic | ICD-10-CM

## 2021-07-16 RX ORDER — ATORVASTATIN CALCIUM 10 MG/1
TABLET, FILM COATED ORAL
Qty: 90 TABLET | Refills: 1 | Status: SHIPPED | OUTPATIENT
Start: 2021-07-16 | End: 2022-02-18

## 2021-07-22 ENCOUNTER — PATIENT MESSAGE (OUTPATIENT)
Dept: MEDICAL GROUP | Facility: MEDICAL CENTER | Age: 66
End: 2021-07-22

## 2021-07-23 RX ORDER — TADALAFIL 10 MG/1
10 TABLET ORAL PRN
Qty: 10 TABLET | Refills: 3 | Status: SHIPPED
Start: 2021-07-23 | End: 2021-07-23

## 2021-07-23 RX ORDER — TADALAFIL 10 MG/1
10 TABLET ORAL PRN
Qty: 10 TABLET | Refills: 3 | Status: SHIPPED | OUTPATIENT
Start: 2021-07-23 | End: 2023-07-19 | Stop reason: SDUPTHER

## 2021-07-26 ENCOUNTER — TELEPHONE (OUTPATIENT)
Dept: MEDICAL GROUP | Facility: MEDICAL CENTER | Age: 66
End: 2021-07-26

## 2021-07-26 NOTE — TELEPHONE ENCOUNTER
MEDICATION PRIOR AUTHORIZATION NEEDED:    1. Name of Medication: Tadalifil 10 mg    2. Requested By (Name of Pharmacy): [facility]     3. Is insurance on file current? yes    4. What is the name & phone number of the 3rd party payor? [phone]    PAR Submitted 07/26/2021

## 2021-07-30 NOTE — TELEPHONE ENCOUNTER
FINAL PRIOR AUTHORIZATION STATUS:    1.  Name of Medication & Dose:  Tadalifil 10 mg     2. Prior Auth Status: Denied.  Reason: Not Covered    3. Action Taken: Pharmacy Notified: yes Patient Notified: N\A

## 2021-12-07 ENCOUNTER — OFFICE VISIT (OUTPATIENT)
Dept: MEDICAL GROUP | Facility: MEDICAL CENTER | Age: 66
End: 2021-12-07
Payer: MEDICARE

## 2021-12-07 VITALS
SYSTOLIC BLOOD PRESSURE: 132 MMHG | OXYGEN SATURATION: 93 % | HEART RATE: 80 BPM | WEIGHT: 230.38 LBS | TEMPERATURE: 97.7 F | HEIGHT: 71 IN | DIASTOLIC BLOOD PRESSURE: 86 MMHG | RESPIRATION RATE: 16 BRPM | BODY MASS INDEX: 32.25 KG/M2

## 2021-12-07 DIAGNOSIS — K92.1 BLOOD IN STOOL: ICD-10-CM

## 2021-12-07 DIAGNOSIS — R73.9 HYPERGLYCEMIA: ICD-10-CM

## 2021-12-07 DIAGNOSIS — I10 ESSENTIAL HYPERTENSION: ICD-10-CM

## 2021-12-07 DIAGNOSIS — K22.719 BARRETT'S ESOPHAGUS WITH DYSPLASIA: ICD-10-CM

## 2021-12-07 DIAGNOSIS — Z12.5 PROSTATE CANCER SCREENING: ICD-10-CM

## 2021-12-07 DIAGNOSIS — E78.5 DYSLIPIDEMIA: ICD-10-CM

## 2021-12-07 DIAGNOSIS — F33.42 RECURRENT MAJOR DEPRESSIVE DISORDER, IN FULL REMISSION (HCC): ICD-10-CM

## 2021-12-07 DIAGNOSIS — F33.1 MODERATE EPISODE OF RECURRENT MAJOR DEPRESSIVE DISORDER (HCC): ICD-10-CM

## 2021-12-07 PROCEDURE — 99214 OFFICE O/P EST MOD 30 MIN: CPT | Performed by: FAMILY MEDICINE

## 2021-12-07 RX ORDER — TIMOLOL MALEATE 5 MG/ML
1 SOLUTION/ DROPS OPHTHALMIC DAILY
COMMUNITY
Start: 2021-12-01

## 2021-12-07 RX ORDER — LATANOPROST 50 UG/ML
1 SOLUTION/ DROPS OPHTHALMIC DAILY
COMMUNITY
Start: 2021-11-06

## 2021-12-07 RX ORDER — KETOROLAC TROMETHAMINE 5 MG/ML
SOLUTION OPHTHALMIC
COMMUNITY
Start: 2021-10-29 | End: 2022-01-07

## 2021-12-07 RX ORDER — ESCITALOPRAM OXALATE 20 MG/1
20 TABLET ORAL DAILY
Qty: 90 TABLET | Refills: 3 | Status: SHIPPED | OUTPATIENT
Start: 2021-12-07 | End: 2022-03-18 | Stop reason: SDUPTHER

## 2021-12-07 ASSESSMENT — PATIENT HEALTH QUESTIONNAIRE - PHQ9
4. FEELING TIRED OR HAVING LITTLE ENERGY: SEVERAL DAYS
SUM OF ALL RESPONSES TO PHQ9 QUESTIONS 1 AND 2: 2
7. TROUBLE CONCENTRATING ON THINGS, SUCH AS READING THE NEWSPAPER OR WATCHING TELEVISION: SEVERAL DAYS
2. FEELING DOWN, DEPRESSED, IRRITABLE, OR HOPELESS: SEVERAL DAYS
6. FEELING BAD ABOUT YOURSELF - OR THAT YOU ARE A FAILURE OR HAVE LET YOURSELF OR YOUR FAMILY DOWN: MORE THAN HALF THE DAYS
8. MOVING OR SPEAKING SO SLOWLY THAT OTHER PEOPLE COULD HAVE NOTICED. OR THE OPPOSITE, BEING SO FIGETY OR RESTLESS THAT YOU HAVE BEEN MOVING AROUND A LOT MORE THAN USUAL: NOT AT ALL
1. LITTLE INTEREST OR PLEASURE IN DOING THINGS: SEVERAL DAYS
SUM OF ALL RESPONSES TO PHQ QUESTIONS 1-9: 7
5. POOR APPETITE OR OVEREATING: NOT AT ALL
9. THOUGHTS THAT YOU WOULD BE BETTER OFF DEAD, OR OF HURTING YOURSELF: SEVERAL DAYS
3. TROUBLE FALLING OR STAYING ASLEEP OR SLEEPING TOO MUCH: NOT AT ALL

## 2021-12-07 ASSESSMENT — FIBROSIS 4 INDEX: FIB4 SCORE: 1.35

## 2021-12-07 NOTE — ASSESSMENT & PLAN NOTE
Dean reports recent bright red blood in stools, first episode yesterday morning, occurred twice today. He reports diarrhea with episodes. He reports cramping preceding BM which resolved with defecation. No nausea or vomiting. He has an appointment with GI next week for EGD.

## 2021-12-08 NOTE — ASSESSMENT & PLAN NOTE
Dean follows with [facility], he is due for repeat EGD and is scheduled next week.  He is on omeprazole 20 mg daily.  Hematochezia per above.

## 2021-12-08 NOTE — ASSESSMENT & PLAN NOTE
This is a chronic problem.  The patient reports compliance with atorvastatin 10 mg nightly.     Lab Results   Component Value Date/Time    CHOLSTRLTOT 200 (H) 11/06/2020 09:56 AM    LDL 97 11/06/2020 09:56 AM    HDL 88 11/06/2020 09:56 AM    TRIGLYCERIDE 77 11/06/2020 09:56 AM

## 2021-12-08 NOTE — ASSESSMENT & PLAN NOTE
This is a chronic problem.  Patient is struggled with depression for several decades.  He has been on Zoloft 200 mg daily for over 10 years.  Proceeded with a trial of addition of Wellbutrin about a year ago with minimal improvement.  He is amenable to trial of switching to Lexapro as he continues to have persistent symptoms on Zoloft.  He is also amenable to establishing with a counselor.  He denies active suicidal ideation or plans however notes he sometimes does not feel there is a purpose to existing.

## 2021-12-08 NOTE — ASSESSMENT & PLAN NOTE
This is a chronic problem.  Patient reports his blood pressures been well controlled for multiple years on amlodipine-benazepril 5-20 mg daily.

## 2021-12-08 NOTE — PROGRESS NOTES
Subjective:     CC: blood in stool, follow up depression    HPI:   Dean presents today with     Blood in stool  Dean reports recent bright red blood in stools, first episode yesterday morning, occurred twice today. He reports diarrhea with episodes. He reports cramping preceding BM which resolved with defecation. No nausea or vomiting. He has an appointment with GI next week for EGD.    Pro's esophagus  Dean follows with [facility], he is due for repeat EGD and is scheduled next week.  He is on omeprazole 20 mg daily.  Hematochezia per above.    Essential hypertension  This is a chronic problem.  Patient reports his blood pressures been well controlled for multiple years on amlodipine-benazepril 5-20 mg daily.        Moderate episode of recurrent major depressive disorder (HCC)  This is a chronic problem.  Patient is struggled with depression for several decades.  He has been on Zoloft 200 mg daily for over 10 years.  Proceeded with a trial of addition of Wellbutrin about a year ago with minimal improvement.  He is amenable to trial of switching to Lexapro as he continues to have persistent symptoms on Zoloft.  He is also amenable to establishing with a counselor.  He denies active suicidal ideation or plans however notes he sometimes does not feel there is a purpose to existing.    Dyslipidemia  This is a chronic problem.  The patient reports compliance with atorvastatin 10 mg nightly.     Lab Results   Component Value Date/Time    CHOLSTRLTOT 200 (H) 11/06/2020 09:56 AM    LDL 97 11/06/2020 09:56 AM    HDL 88 11/06/2020 09:56 AM    TRIGLYCERIDE 77 11/06/2020 09:56 AM             Past Medical History:   Diagnosis Date   • ADD (attention deficit disorder) 1/5/2012   • BPH (benign prostatic hyperplasia) 1/5/2012   • Dyslipidemia 1/5/2012   • ED (erectile dysfunction) 1/5/2012   • HTN (hypertension), benign 1/5/2012       Social History     Tobacco Use   • Smoking status: Former Smoker     Types:  "Cigarettes   • Smokeless tobacco: Never Used   • Tobacco comment: 16-30   Vaping Use   • Vaping Use: Never used   Substance Use Topics   • Alcohol use: Yes     Alcohol/week: 6.0 oz     Types: 12 Glasses of wine per week     Comment: a bottle of wine a day   • Drug use: No       Current Outpatient Medications Ordered in Epic   Medication Sig Dispense Refill   • latanoprost (XALATAN) 0.005 % Solution      • ketorolac (ACULAR) 0.5 % Solution      • timolol (TIMOPTIC) 0.5 % Solution      • escitalopram (LEXAPRO) 20 MG tablet Take 1 Tablet by mouth every day. 90 Tablet 3   • tadalafil (CIALIS) 10 MG tablet Take 1 tablet by mouth as needed for Erectile Dysfunction. 10 tablet 3   • atorvastatin (LIPITOR) 10 MG Tab TAKE ONE TABLET BY MOUTH EVERY DAY 90 tablet 1   • triamcinolone acetonide (KENALOG) 0.1 % Cream Apply thin layer to affected area BID X 14 days 80 g 2   • amlodipine-benazepril (LOTREL) 5-20 MG per capsule Take 1 Cap by mouth every day. 90 Cap 3   • omeprazole (PRILOSEC) 20 MG delayed-release capsule Take 1 Cap by mouth every day. 90 Cap 3     No current Epic-ordered facility-administered medications on file.       Allergies:  Patient has no known allergies.    Health Maintenance: UTD    ROS:  Gen: no fevers/chills, no changes in weight  Eyes: no changes in vision  ENT: no sore throat, no hearing loss, no bloody nose  Pulm: no SOB  CV: no chest pain        Objective:       Exam:  /86 (BP Location: Left arm, Patient Position: Sitting, BP Cuff Size: Adult long)   Pulse 80   Temp 36.5 °C (97.7 °F) (Temporal)   Resp 16   Ht 1.803 m (5' 11\")   Wt 104 kg (230 lb 6.1 oz)   SpO2 93%   BMI 32.13 kg/m²  Body mass index is 32.13 kg/m².    Gen: Alert and oriented, No apparent distress  Lungs: Normal effort, CTA bilaterally, no wheezes, rhonchi, or rales  CV: Regular rate and rhythm, no murmurs, rubs, or gallops  Abd:    Soft, NTND  Rectal: Pt declines    Assessment & Plan:     66 y.o. male with the following - "     1. Blood in stool  Patient is scheduled for EGD with GI next week; recommended patient call GI immediately following this appointment to update on new rectal bleeding and request colonoscopy. ED precautions discussed.  - CBC WITH DIFFERENTIAL; Future    2. Prostate cancer screening  - PROSTATE SPECIFIC AG SCREENING; Future    3. Dyslipidemia  -Continue atorvastatin 10 mg nightly  - Comp Metabolic Panel; Future  - Lipid Profile; Future  - HEMOGLOBIN A1C; Future    4. Hyperglycemia  - HEMOGLOBIN A1C; Future    5. Recurrent major depressive disorder, currently active (HCC)  Patient has been on zoloft for >10 years, persistent symptoms, he is amenable to trial of lexapro. Recommended patient stop zoloft, start lexapro. He is amenable to referral for counseling. Follow up in 1 month  - Referral to Behavioral Health    6. Pro's esophagus with dysplasia  - Scheduled for EGD next week  - Continue omeprazole    7. Essential hypertension  - Well-controlled, continue current regimen     Other orders  - latanoprost (XALATAN) 0.005 % Solution  - ketorolac (ACULAR) 0.5 % Solution  - timolol (TIMOPTIC) 0.5 % Solution  - escitalopram (LEXAPRO) 20 MG tablet; Take 1 Tablet by mouth every day.  Dispense: 90 Tablet; Refill: 3      Return in about 1 month (around 1/7/2022).    Please note this dictation was created using voice recognition software. I have made every reasonable attempt to correct obvious errors, but I expect there may be errors of grammar, and possibly content, that I did not discover before finalizing the note.

## 2021-12-09 ENCOUNTER — PATIENT MESSAGE (OUTPATIENT)
Dept: MEDICAL GROUP | Facility: MEDICAL CENTER | Age: 66
End: 2021-12-09

## 2021-12-09 NOTE — LETTER
December 10, 2021    To Whom It May Concern:         This is confirmation that Dean Nobels attended his scheduled appointment with Leora Mcconnell M.D. on 12/07/21. Dean reports multiple episode of abdominal cramping followed by hematochezia therefore I recommend he have a colonoscopy along with the EGD that is scheduled for next week.          If you have any questions please do not hesitate to call me at the phone number listed below.    Sincerely,          Leora Mcconnell M.D.  [phone]

## 2021-12-15 ENCOUNTER — HOSPITAL ENCOUNTER (OUTPATIENT)
Dept: LAB | Facility: MEDICAL CENTER | Age: 66
End: 2021-12-15
Attending: FAMILY MEDICINE
Payer: MEDICARE

## 2021-12-15 DIAGNOSIS — R73.9 HYPERGLYCEMIA: ICD-10-CM

## 2021-12-15 DIAGNOSIS — E78.5 DYSLIPIDEMIA: ICD-10-CM

## 2021-12-15 DIAGNOSIS — K92.1 BLOOD IN STOOL: ICD-10-CM

## 2021-12-15 DIAGNOSIS — Z12.5 PROSTATE CANCER SCREENING: ICD-10-CM

## 2021-12-15 LAB
ALBUMIN SERPL BCP-MCNC: 4.6 G/DL (ref 3.2–4.9)
ALBUMIN/GLOB SERPL: 1.8 G/DL
ALP SERPL-CCNC: 62 U/L (ref 30–99)
ALT SERPL-CCNC: 40 U/L (ref 2–50)
ANION GAP SERPL CALC-SCNC: 12 MMOL/L (ref 7–16)
AST SERPL-CCNC: 33 U/L (ref 12–45)
BASOPHILS # BLD AUTO: 0.5 % (ref 0–1.8)
BASOPHILS # BLD: 0.04 K/UL (ref 0–0.12)
BILIRUB SERPL-MCNC: 0.9 MG/DL (ref 0.1–1.5)
BUN SERPL-MCNC: 11 MG/DL (ref 8–22)
CALCIUM SERPL-MCNC: 9.1 MG/DL (ref 8.5–10.5)
CHLORIDE SERPL-SCNC: 106 MMOL/L (ref 96–112)
CHOLEST SERPL-MCNC: 205 MG/DL (ref 100–199)
CO2 SERPL-SCNC: 24 MMOL/L (ref 20–33)
CREAT SERPL-MCNC: 0.73 MG/DL (ref 0.5–1.4)
EOSINOPHIL # BLD AUTO: 0.08 K/UL (ref 0–0.51)
EOSINOPHIL NFR BLD: 1.1 % (ref 0–6.9)
ERYTHROCYTE [DISTWIDTH] IN BLOOD BY AUTOMATED COUNT: 44.2 FL (ref 35.9–50)
EST. AVERAGE GLUCOSE BLD GHB EST-MCNC: 100 MG/DL
FASTING STATUS PATIENT QL REPORTED: NORMAL
GLOBULIN SER CALC-MCNC: 2.5 G/DL (ref 1.9–3.5)
GLUCOSE SERPL-MCNC: 88 MG/DL (ref 65–99)
HBA1C MFR BLD: 5.1 % (ref 4–5.6)
HCT VFR BLD AUTO: 45 % (ref 42–52)
HDLC SERPL-MCNC: 80 MG/DL
HGB BLD-MCNC: 15.6 G/DL (ref 14–18)
IMM GRANULOCYTES # BLD AUTO: 0.03 K/UL (ref 0–0.11)
IMM GRANULOCYTES NFR BLD AUTO: 0.4 % (ref 0–0.9)
LDLC SERPL CALC-MCNC: 103 MG/DL
LYMPHOCYTES # BLD AUTO: 1.21 K/UL (ref 1–4.8)
LYMPHOCYTES NFR BLD: 16.6 % (ref 22–41)
MCH RBC QN AUTO: 32.1 PG (ref 27–33)
MCHC RBC AUTO-ENTMCNC: 34.7 G/DL (ref 33.7–35.3)
MCV RBC AUTO: 92.6 FL (ref 81.4–97.8)
MONOCYTES # BLD AUTO: 0.74 K/UL (ref 0–0.85)
MONOCYTES NFR BLD AUTO: 10.2 % (ref 0–13.4)
NEUTROPHILS # BLD AUTO: 5.19 K/UL (ref 1.82–7.42)
NEUTROPHILS NFR BLD: 71.2 % (ref 44–72)
NRBC # BLD AUTO: 0 K/UL
NRBC BLD-RTO: 0 /100 WBC
PLATELET # BLD AUTO: 247 K/UL (ref 164–446)
PMV BLD AUTO: 11.4 FL (ref 9–12.9)
POTASSIUM SERPL-SCNC: 4 MMOL/L (ref 3.6–5.5)
PROT SERPL-MCNC: 7.1 G/DL (ref 6–8.2)
PSA SERPL-MCNC: 1.91 NG/ML (ref 0–4)
RBC # BLD AUTO: 4.86 M/UL (ref 4.7–6.1)
SODIUM SERPL-SCNC: 142 MMOL/L (ref 135–145)
TRIGL SERPL-MCNC: 109 MG/DL (ref 0–149)
WBC # BLD AUTO: 7.3 K/UL (ref 4.8–10.8)

## 2021-12-15 PROCEDURE — 36415 COLL VENOUS BLD VENIPUNCTURE: CPT

## 2021-12-15 PROCEDURE — 80053 COMPREHEN METABOLIC PANEL: CPT

## 2021-12-15 PROCEDURE — 83036 HEMOGLOBIN GLYCOSYLATED A1C: CPT | Mod: GA

## 2021-12-15 PROCEDURE — 80061 LIPID PANEL: CPT

## 2021-12-15 PROCEDURE — 84153 ASSAY OF PSA TOTAL: CPT | Mod: GA

## 2021-12-15 PROCEDURE — 85025 COMPLETE CBC W/AUTO DIFF WBC: CPT

## 2022-01-07 ENCOUNTER — OFFICE VISIT (OUTPATIENT)
Dept: MEDICAL GROUP | Facility: MEDICAL CENTER | Age: 67
End: 2022-01-07
Payer: MEDICARE

## 2022-01-07 VITALS
SYSTOLIC BLOOD PRESSURE: 126 MMHG | DIASTOLIC BLOOD PRESSURE: 74 MMHG | HEIGHT: 71 IN | OXYGEN SATURATION: 96 % | BODY MASS INDEX: 32.37 KG/M2 | HEART RATE: 68 BPM | WEIGHT: 231.2 LBS | TEMPERATURE: 97.7 F | RESPIRATION RATE: 18 BRPM

## 2022-01-07 DIAGNOSIS — F33.1 MODERATE EPISODE OF RECURRENT MAJOR DEPRESSIVE DISORDER (HCC): ICD-10-CM

## 2022-01-07 PROCEDURE — 99213 OFFICE O/P EST LOW 20 MIN: CPT | Performed by: FAMILY MEDICINE

## 2022-01-07 ASSESSMENT — PATIENT HEALTH QUESTIONNAIRE - PHQ9
SUM OF ALL RESPONSES TO PHQ QUESTIONS 1-9: 9
CLINICAL INTERPRETATION OF PHQ2 SCORE: 2
5. POOR APPETITE OR OVEREATING: 0 - NOT AT ALL

## 2022-01-07 ASSESSMENT — FIBROSIS 4 INDEX: FIB4 SCORE: 1.44

## 2022-01-07 NOTE — ASSESSMENT & PLAN NOTE
Dean has suffered from MDD for many years. He was on zoloft for over ten years however this was not adequately treating his symptoms. He tried wellbutrin about a year ago with minimal improvement. We elected to switch to lexapro 20mg daily about a month ago. Dean reports subtle but definite improvement in his symptoms. He is still considering seeing a counselor. He denies active SI or plans however he sometimes feels there is no purpose to existing. He confirms he does not have a desire to harm himself.    Depression Screening    Little interest or pleasure in doing things?  1 - several days   Feeling down, depressed , or hopeless? 1 - several days   Trouble falling or staying asleep, or sleeping too much?  2 - more than half the days   Feeling tired or having little energy?  1 - several days   Poor appetite or overeating?  0 - not at all   Feeling bad about yourself - or that you are a failure or have let yourself or your family down? 2 - more than half the days   Trouble concentrating on things, such as reading the newspaper or watching television? 1 - several days   Moving or speaking so slowly that other people could have noticed.  Or the opposite - being so fidgety or restless that you have been moving around a lot more than usual?  0 - not at all   Thoughts that you would be better off dead, or of hurting yourself?  1 - several days   Patient Health Questionnaire Score: 9       If depressive symptoms identified deferred to follow up visit unless specifically addressed in assesment and plan.    Interpretation of PHQ-9 Total Score   Score Severity   1-4 No Depression   5-9 Mild Depression   10-14 Moderate Depression   15-19 Moderately Severe Depression   20-27 Severe Depression

## 2022-01-08 NOTE — PROGRESS NOTES
Subjective:     CC: follow up depression    HPI:   Dean presents today with:    Moderate episode of recurrent major depressive disorder (HCC)  Dean has suffered from MDD for many years. He was on zoloft for over ten years however this was not adequately treating his symptoms. He tried wellbutrin about a year ago with minimal improvement. We elected to switch to lexapro 20mg daily about a month ago. Dean reports subtle but definite improvement in his symptoms. He is still considering seeing a counselor. He denies active SI or plans however he sometimes feels there is no purpose to existing. He confirms he does not have a desire to harm himself.    Depression Screening    Little interest or pleasure in doing things?  1 - several days   Feeling down, depressed , or hopeless? 1 - several days   Trouble falling or staying asleep, or sleeping too much?  2 - more than half the days   Feeling tired or having little energy?  1 - several days   Poor appetite or overeating?  0 - not at all   Feeling bad about yourself - or that you are a failure or have let yourself or your family down? 2 - more than half the days   Trouble concentrating on things, such as reading the newspaper or watching television? 1 - several days   Moving or speaking so slowly that other people could have noticed.  Or the opposite - being so fidgety or restless that you have been moving around a lot more than usual?  0 - not at all   Thoughts that you would be better off dead, or of hurting yourself?  1 - several days   Patient Health Questionnaire Score: 9       If depressive symptoms identified deferred to follow up visit unless specifically addressed in assesment and plan.    Interpretation of PHQ-9 Total Score   Score Severity   1-4 No Depression   5-9 Mild Depression   10-14 Moderate Depression   15-19 Moderately Severe Depression   20-27 Severe Depression        Past Medical History:   Diagnosis Date   • ADD (attention deficit disorder) 1/5/2012   •  "BPH (benign prostatic hyperplasia) 1/5/2012   • Dyslipidemia 1/5/2012   • ED (erectile dysfunction) 1/5/2012   • HTN (hypertension), benign 1/5/2012       Social History     Tobacco Use   • Smoking status: Former Smoker     Types: Cigarettes   • Smokeless tobacco: Never Used   • Tobacco comment: 16-30   Vaping Use   • Vaping Use: Never used   Substance Use Topics   • Alcohol use: Yes     Alcohol/week: 6.0 oz     Types: 12 Glasses of wine per week     Comment: a bottle of wine a day   • Drug use: No       Current Outpatient Medications Ordered in Epic   Medication Sig Dispense Refill   • latanoprost (XALATAN) 0.005 % Solution      • timolol (TIMOPTIC) 0.5 % Solution      • escitalopram (LEXAPRO) 20 MG tablet Take 1 Tablet by mouth every day. 90 Tablet 3   • tadalafil (CIALIS) 10 MG tablet Take 1 tablet by mouth as needed for Erectile Dysfunction. 10 tablet 3   • atorvastatin (LIPITOR) 10 MG Tab TAKE ONE TABLET BY MOUTH EVERY DAY 90 tablet 1   • amlodipine-benazepril (LOTREL) 5-20 MG per capsule Take 1 Cap by mouth every day. 90 Cap 3   • omeprazole (PRILOSEC) 20 MG delayed-release capsule Take 1 Cap by mouth every day. 90 Cap 3   • ketorolac (ACULAR) 0.5 % Solution      • triamcinolone acetonide (KENALOG) 0.1 % Cream Apply thin layer to affected area BID X 14 days (Patient not taking: Reported on 1/7/2022) 80 g 2     No current Epic-ordered facility-administered medications on file.       Allergies:  Patient has no known allergies.    Health Maintenance: UTD    ROS:  Gen: no fevers/chills, no changes in weight  Eyes: no changes in vision  ENT: no sore throat, no hearing loss, no bloody nose  Pulm: no SOB  CV: no chest pain        Objective:       Exam:  /74 (BP Location: Left arm, Patient Position: Sitting, BP Cuff Size: Adult)   Pulse 68   Temp 36.5 °C (97.7 °F) (Temporal)   Resp 18   Ht 1.803 m (5' 11\")   Wt 105 kg (231 lb 3.2 oz)   SpO2 96%   BMI 32.25 kg/m²  Body mass index is 32.25 " kg/m².    Gen: Alert and oriented, No apparent distress  Lungs: Normal effort, CTA bilaterally, no wheezes, rhonchi, or rales  CV: Regular rate and rhythm, no murmurs, rubs, or gallops      Assessment & Plan:     66 y.o. male with the following -     1. Moderate episode of recurrent major depressive disorder (HCC)  Symptoms improved s/p switching from zoloft to lexapro. Encouraged patient to continue counseling. He recently joined a gym, encouraged him to incorporate regular exercise. Follow up in one to two months.     Return in about 6 weeks (around 2/18/2022).    Please note this dictation was created using voice recognition software. I have made every reasonable attempt to correct obvious errors, but I expect there may be errors of grammar, and possibly content, that I did not discover before finalizing the note.

## 2022-01-10 DIAGNOSIS — I10 HTN (HYPERTENSION), BENIGN: Chronic | ICD-10-CM

## 2022-01-10 RX ORDER — AMLODIPINE BESYLATE AND BENAZEPRIL HYDROCHLORIDE 5; 20 MG/1; MG/1
CAPSULE ORAL
Qty: 90 CAPSULE | Refills: 3 | Status: SHIPPED | OUTPATIENT
Start: 2022-01-10 | End: 2022-10-26 | Stop reason: SDUPTHER

## 2022-01-11 ENCOUNTER — PATIENT MESSAGE (OUTPATIENT)
Dept: MEDICAL GROUP | Facility: MEDICAL CENTER | Age: 67
End: 2022-01-11

## 2022-01-11 RX ORDER — ESCITALOPRAM OXALATE 10 MG/1
10 TABLET ORAL DAILY
Qty: 30 TABLET | Refills: 1 | Status: SHIPPED | OUTPATIENT
Start: 2022-01-11 | End: 2022-03-16

## 2022-02-18 DIAGNOSIS — E78.5 DYSLIPIDEMIA: Chronic | ICD-10-CM

## 2022-02-18 RX ORDER — ATORVASTATIN CALCIUM 10 MG/1
TABLET, FILM COATED ORAL
Qty: 90 TABLET | Refills: 3 | Status: SHIPPED | OUTPATIENT
Start: 2022-02-18 | End: 2023-05-23 | Stop reason: SDUPTHER

## 2022-02-18 NOTE — TELEPHONE ENCOUNTER
Received request via: Pharmacy    Was the patient seen in the last year in this department? Yes    Does the patient have an active prescription (recently filled or refills available) for medication(s) requested? No     Future Appointments       Provider Department [facility]    3/18/2022 12:30 PM Leora Mcconnell M.D. [facility][note 3]

## 2022-03-15 NOTE — TELEPHONE ENCOUNTER
Received request via: Pharmacy    Was the patient seen in the last year in this department? Yes    Does the patient have an active prescription (recently filled or refills available) for medication(s) requested? No     Future Appointments       Provider Department [place]    3/18/2022 12:30 PM Leora Mcconnell M.D. [facility]    3/29/2022 11:00 AM Leonie Guillen LCSW, MSW [facility]

## 2022-03-16 RX ORDER — ESCITALOPRAM OXALATE 10 MG/1
TABLET ORAL
Qty: 30 TABLET | Refills: 0 | Status: SHIPPED | OUTPATIENT
Start: 2022-03-16 | End: 2022-03-18 | Stop reason: SDUPTHER

## 2022-03-18 ENCOUNTER — OFFICE VISIT (OUTPATIENT)
Dept: MEDICAL GROUP | Facility: MEDICAL CENTER | Age: 67
End: 2022-03-18
Payer: MEDICARE

## 2022-03-18 VITALS
HEART RATE: 72 BPM | SYSTOLIC BLOOD PRESSURE: 120 MMHG | DIASTOLIC BLOOD PRESSURE: 72 MMHG | RESPIRATION RATE: 16 BRPM | BODY MASS INDEX: 32.47 KG/M2 | HEIGHT: 71 IN | OXYGEN SATURATION: 95 % | TEMPERATURE: 97.5 F | WEIGHT: 231.92 LBS

## 2022-03-18 DIAGNOSIS — K22.719 BARRETT'S ESOPHAGUS WITH DYSPLASIA: ICD-10-CM

## 2022-03-18 DIAGNOSIS — F33.1 MODERATE EPISODE OF RECURRENT MAJOR DEPRESSIVE DISORDER (HCC): ICD-10-CM

## 2022-03-18 DIAGNOSIS — K92.1 BLOOD IN STOOL: ICD-10-CM

## 2022-03-18 PROCEDURE — 99214 OFFICE O/P EST MOD 30 MIN: CPT | Performed by: FAMILY MEDICINE

## 2022-03-18 RX ORDER — ESCITALOPRAM OXALATE 10 MG/1
10 TABLET ORAL DAILY
Qty: 90 TABLET | Refills: 2 | Status: SHIPPED | OUTPATIENT
Start: 2022-03-18 | End: 2022-04-14

## 2022-03-18 RX ORDER — ESCITALOPRAM OXALATE 20 MG/1
20 TABLET ORAL DAILY
Qty: 90 TABLET | Refills: 3 | Status: SHIPPED | OUTPATIENT
Start: 2022-03-18 | End: 2022-10-26 | Stop reason: SDUPTHER

## 2022-03-18 ASSESSMENT — FIBROSIS 4 INDEX: FIB4 SCORE: 1.46

## 2022-03-18 NOTE — LETTER
[facility]  Leora Mcconnell M.D.  [address]  [facility] [address]  Fax: [phone]   Authorization for Release/Disclosure of   Protected Health Information   Name: RAFIA CALDWELL : 1955 SSN: [id number]   Address: [address] [place] [address]  [place] [address] Phone:    [phone] (home) [phone] (work)   I authorize the entity listed below to release/disclose the PHI below to:   [facility]/Leora Mcconnell M.D. and Leora Mcconnell M.D.   Provider or Entity Name:[facility]     Address   City, [facility]   Phone:[phone]      Fax:[phone]     Reason for request: continuity of care   Information to be released:    [  ] LAST COLONOSCOPY,  including any PATH REPORT and follow-up  [  ] LAST FIT/COLOGUARD RESULT [  ] LAST DEXA  [  ] LAST MAMMOGRAM  [  ] LAST PAP  [  ] LAST LABS [  ] RETINA EXAM REPORT  [  ] IMMUNIZATION RECORDS  [ xxx ] Release all info      [  ] Check here and initial the line next to each item to release ALL health information INCLUDING  _____ Care and treatment for drug and / or alcohol abuse  _____ HIV testing, infection status, or AIDS  _____ Genetic Testing    DATES OF SERVICE OR TIME PERIOD TO BE DISCLOSED: _____________  I understand and acknowledge that:  * This Authorization may be revoked at any time by you in writing, except if your health information has already been used or disclosed.  * Your health information that will be used or disclosed as a result of you signing this authorization could be re-disclosed by the recipient. If this occurs, your re-disclosed health information may no longer be protected by State or Federal laws.  * You may refuse to sign this Authorization. Your refusal will not affect your ability to obtain treatment.  * This Authorization becomes effective upon signing and will  on (date) __________.      If no date is indicated, this Authorization will  one (1) year from the signature date.    Name: Rafia Mccullough  Giuliano    Signature:Continuation of Care   Date:     3/18/2022       PLEASE FAX REQUESTED RECORDS BACK TO: [phone]

## 2022-03-18 NOTE — ASSESSMENT & PLAN NOTE
Dean had an episode of hematochezia a couple months ago; he completed colonoscopy and EGD at [facility] within last two months and reports both were normal; he reports blood in stool has resolved.

## 2022-03-18 NOTE — PROGRESS NOTES
Subjective:     CC: follow up depression    HPI:   Dean presents today with:     Moderate episode of recurrent major depressive disorder (HCC)  Dean has suffered from MDD for many years. He was on zoloft for over ten years however this was not adequately treating his symptoms. He tried wellbutrin about a year ago with minimal improvement. We elected to switch to lexapro a few months ago. Dean reports subtle but definite improvement in his symptoms. He has scheduled an appointment a counselor.    He reports he has more motivation. He joined the gym, he got his teeth cleaned, went to the dermatologist.     Pro's esophagus  Dean follows with [facility]. He is on omeprazole 20 mg daily.      Blood in stool  Dean had an episode of hematochezia a couple months ago; he completed colonoscopy and EGD at [facility] within last two months and reports both were normal; he reports blood in stool has resolved.      Past Medical History:   Diagnosis Date   • ADD (attention deficit disorder) 1/5/2012   • BPH (benign prostatic hyperplasia) 1/5/2012   • Dyslipidemia 1/5/2012   • ED (erectile dysfunction) 1/5/2012   • HTN (hypertension), benign 1/5/2012       Social History     Tobacco Use   • Smoking status: Former Smoker     Types: Cigarettes   • Smokeless tobacco: Never Used   • Tobacco comment: 16-30   Vaping Use   • Vaping Use: Never used   Substance Use Topics   • Alcohol use: Yes     Alcohol/week: 6.0 oz     Types: 12 Glasses of wine per week     Comment: a bottle of wine a day   • Drug use: No       Current Outpatient Medications Ordered in Epic   Medication Sig Dispense Refill   • escitalopram (LEXAPRO) 20 MG tablet Take 1 Tablet by mouth every day. 90 Tablet 3   • escitalopram (LEXAPRO) 10 MG Tab Take 1 Tablet by mouth every day. 90 Tablet 2   • atorvastatin (LIPITOR) 10 MG Tab TAKE 1 TABLET BY MOUTH DAILY 90 Tablet 3   • amlodipine-benazepril (LOTREL) 5-20 MG per capsule TAKE ONE CAPSULE BY MOUTH EVERY DAY 90 Capsule  "3   • latanoprost (XALATAN) 0.005 % Solution      • timolol (TIMOPTIC) 0.5 % Solution      • tadalafil (CIALIS) 10 MG tablet Take 1 tablet by mouth as needed for Erectile Dysfunction. 10 tablet 3   • omeprazole (PRILOSEC) 20 MG delayed-release capsule Take 1 Cap by mouth every day. 90 Cap 3     No current Epic-ordered facility-administered medications on file.       Allergies:  Patient has no known allergies.    Health Maintenance: UTD    ROS:  Gen: no fevers/chills, no changes in weight  Eyes: no changes in vision  ENT: no sore throat, no hearing loss, no bloody nose  Pulm: no SOB  CV: no chest pain        Objective:       Exam:  /72 (BP Location: Left arm, Patient Position: Sitting, BP Cuff Size: Adult long)   Pulse 72   Temp 36.4 °C (97.5 °F) (Temporal)   Resp 16   Ht 1.803 m (5' 11\")   Wt 105 kg (231 lb 14.8 oz)   SpO2 95%   BMI 32.35 kg/m²  Body mass index is 32.35 kg/m².    Gen: Alert and oriented, No apparent distress  Lungs: Normal effort, CTA bilaterally, no wheezes, rhonchi, or rales  CV: Regular rate and rhythm, no murmurs, rubs, or gallops      Assessment & Plan:     67 y.o. male with the following -     1. Moderate episode of recurrent major depressive disorder (HCC)  - Dean reports his symptoms are noticeably improved on Lexapro 30 mg daily.  We will plan to continue this regimen, follow-up in 3 months.  In the meantime Jose is establishing with a counselor.    2. Pro's esophagus with dysplasia  - Continue PPI, requesting EGD records    3. Blood in stool  - resolved, recent EGD and colonoscopy completed, requesting records    Other orders  - escitalopram (LEXAPRO) 20 MG tablet; Take 1 Tablet by mouth every day.  Dispense: 90 Tablet; Refill: 3  - escitalopram (LEXAPRO) 10 MG Tab; Take 1 Tablet by mouth every day.  Dispense: 90 Tablet; Refill: 2      Return in about 3 months (around 6/18/2022).    Please note this dictation was created using voice recognition software. I have made every " reasonable attempt to correct obvious errors, but I expect there may be errors of grammar, and possibly content, that I did not discover before finalizing the note.

## 2022-03-18 NOTE — ASSESSMENT & PLAN NOTE
Dean has suffered from MDD for many years. He was on zoloft for over ten years however this was not adequately treating his symptoms. He tried wellbutrin about a year ago with minimal improvement. We elected to switch to lexapro a few months ago. Dean reports subtle but definite improvement in his symptoms. He has scheduled an appointment a counselor.    He reports he has more motivation. He joined the gym, he got his teeth cleaned, went to the dermatologist.

## 2022-03-29 ENCOUNTER — TELEMEDICINE (OUTPATIENT)
Dept: BEHAVIORAL HEALTH | Facility: CLINIC | Age: 67
End: 2022-03-29
Payer: MEDICARE

## 2022-03-29 DIAGNOSIS — F10.10 ALCOHOL ABUSE, DAILY USE: ICD-10-CM

## 2022-03-29 PROCEDURE — 90791 PSYCH DIAGNOSTIC EVALUATION: CPT | Performed by: SOCIAL WORKER

## 2022-03-29 ASSESSMENT — PATIENT HEALTH QUESTIONNAIRE - PHQ9
CLINICAL INTERPRETATION OF PHQ2 SCORE: 3
SUM OF ALL RESPONSES TO PHQ QUESTIONS 1-9: 10
5. POOR APPETITE OR OVEREATING: 0 - NOT AT ALL
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
## 2022-04-14 RX ORDER — ESCITALOPRAM OXALATE 10 MG/1
TABLET ORAL
Qty: 30 TABLET | Refills: 0 | Status: SHIPPED | OUTPATIENT
Start: 2022-04-14 | End: 2022-10-26 | Stop reason: SDUPTHER

## 2022-05-09 ENCOUNTER — TELEMEDICINE (OUTPATIENT)
Dept: BEHAVIORAL HEALTH | Facility: CLINIC | Age: 67
End: 2022-05-09
Payer: MEDICARE

## 2022-05-09 DIAGNOSIS — F33.1 MODERATE EPISODE OF RECURRENT MAJOR DEPRESSIVE DISORDER (HCC): ICD-10-CM

## 2022-05-09 PROCEDURE — 90837 PSYTX W PT 60 MINUTES: CPT | Mod: 95 | Performed by: SOCIAL WORKER

## 2022-05-09 NOTE — PROGRESS NOTES
"[facility]   Therapy Progress Note    This visit was conducted via Zoom using secure and encrypted videoconferencing technology.  The patient was in his home in the [facility].  The patient's identity was confirmed and verbal consent was obtained for this virtual visit.    Name: Dean Nobles  MRN: [id number]  : 1955  Age: 67 y.o.  Date of assessment: 2022  PCP: Leora Mcconnell M.D..  Persons in attendance: Patient  Total session time: 55 minutes    Topics addressed in psychotherapy include: Pt to indiv video appt. Lexapro 30 mg, feels it is helpful. Not as depressed as before, med is helpful. For the last 5 yrs wife and pt like roommates. Wife fosters through [employer].  Depressed 20 yrs, alcohol use 20 yrs. Drink from 5 p to go to bed, 12a. Dreams theme are always a \"struggle\" or challenge. Pt cannot identify issues to work on to combat depression. Conflict with wife and how property is divided. Plan: See monthly.     Objective Observations:   Participation:Active verbal participation   Grooming:Casual   Cognition:Fully Oriented   Eye Contact:Good   Mood:Depressed   Affect:Congruent with content   Thought Process:Logical   Speech:Rate within normal limits and Volume within normal limits    Current Risk:   Suicide: low   Homicide: NA   Self-Harm: NA   Relapse: NA   Safety Plan Reviewed: NA    Care Plan Updated: No    Does patient express agreement with the above plan? Yes     Diagnosis:  1. Moderate recurrent MDD  2. Alcohol dependence daily use    Therapeutic Intervention(s): Conflict resolution skills and Interpersonal effectiveness skills    Treatment Goal(s)/Objective(s) addressed: depression     Progress toward Treatment Goals: No change    Referral appointment(s) scheduled? No       Leonie Guillen L.C.S.W.    "

## 2022-06-13 ENCOUNTER — TELEMEDICINE (OUTPATIENT)
Dept: BEHAVIORAL HEALTH | Facility: CLINIC | Age: 67
End: 2022-06-13
Payer: MEDICARE

## 2022-06-13 DIAGNOSIS — F10.10 ALCOHOL ABUSE, DAILY USE: ICD-10-CM

## 2022-06-13 DIAGNOSIS — F33.1 MODERATE EPISODE OF RECURRENT MAJOR DEPRESSIVE DISORDER (HCC): ICD-10-CM

## 2022-06-13 PROCEDURE — 90837 PSYTX W PT 60 MINUTES: CPT | Mod: 95 | Performed by: SOCIAL WORKER

## 2022-06-13 NOTE — PROGRESS NOTES
[facility]   Therapy Progress Note    This visit was conducted via Zoom using secure and encrypted videoconferencing technology.  The patient was in his home in the [facility].  The patient's identity was confirmed and verbal consent was obtained for this virtual visit.    Name: Dean Nobles  MRN: [id number]  : 1955  Age: 67 y.o.  Date of assessment: 2022  PCP: Leora Mcconnell M.D..  Persons in attendance: Patient  Total session time: 55 minutes    Topics addressed in psychotherapy include:Pt to [employer] video appt. Pt reports wife agreed to to looking at houses which would be stress off him to downsize. Pt rearranged accounts so wife can see financial status/spending. Pt is considering changing from using hard liquor large bottle vodka (drinks in 2 days) to wine due to expense. Pt denies usage affects life or relationships, and does minimize this as negative in life.  He also does not believe it to be a depressant, stating this is not in the literature, even after reading it from several websites. Continue to assess for tx needs. Plan: see monthly.       Objective Observations:   Participation:Active verbal participation and Engaged   Grooming:Casual   Cognition:Fully Oriented   Eye Contact:Good   Mood:Euthymic   Affect:Flexible and Congruent with content   Thought Process:Goal-directed   Speech:Rate within normal limits and Volume within normal limits    Current Risk:   Suicide: NA   Homicide: NA   Self-Harm: NA   Relapse: NA   Safety Plan Reviewed: NA    Care Plan Updated: No    Does patient express agreement with the above plan? Yes     Diagnosis:  1. Alcohol abuse, daily    Therapeutic Intervention(s): Communication skills and Maladaptive behavior addressed    Treatment Goal(s)/Objective(s) addressed: marital/relationship issues     Progress toward Treatment Goals: No change    Referral appointment(s) scheduled? No       Leonie Guillen L.C.S.W.

## 2022-07-05 ENCOUNTER — TELEMEDICINE (OUTPATIENT)
Dept: BEHAVIORAL HEALTH | Facility: CLINIC | Age: 67
End: 2022-07-05
Payer: MEDICARE

## 2022-07-05 DIAGNOSIS — F10.10 ALCOHOL ABUSE, DAILY USE: ICD-10-CM

## 2022-07-05 DIAGNOSIS — F33.1 MODERATE EPISODE OF RECURRENT MAJOR DEPRESSIVE DISORDER (HCC): ICD-10-CM

## 2022-07-05 PROCEDURE — 90837 PSYTX W PT 60 MINUTES: CPT | Mod: 95 | Performed by: SOCIAL WORKER

## 2022-07-05 NOTE — PROGRESS NOTES
"[facility]   Therapy Progress Note    This visit was conducted via Zoom using secure and encrypted videoconferencing technology.  The patient was in his home in the [facility].  The patient's identity was confirmed and verbal consent was obtained for this virtual visit.    Name: Dean Nobles  MRN: [id number]  : 1955  Age: 67 y.o.  Date of assessment: 2022  PCP: Leora Mcconnell M.D..  Persons in attendance: Patient  Total session time: 55 minutes    Topics addressed in psychotherapy include: Pt to [employer] video appt. Quit drinking 2 weeks ago after \"bad hangover\" subsequent to drinking a 1/5 vodka. Pt reports he does  not like life, drinking for something to look forward to. Clinical focus on identifying purpose since not working for 2 yrs. He is unclear what to do next. Moving forward with getting house ready to sell, helping son build bathroom. Plan: see biweekly.     Objective Observations:   Participation:Active verbal participation, Engaged and Open to feedback   Grooming:Casual   Cognition:Fully Oriented   Eye Contact:Good   Mood:Depressed   Affect:Congruent with content   Thought Process:Goal-directed   Speech:Rate within normal limits and Volume within normal limits    Current Risk:   Suicide: low   Homicide: NA   Self-Harm: NA   Relapse: NA   Safety Plan Reviewed: no    Care Plan Updated: No    Does patient express agreement with the above plan? Yes     Diagnosis:  1. Moderate recurrent major depression  2. Alcohol abuse daily    Therapeutic Intervention(s): Cognitive modification and Develop/modify treatment plan    Treatment Goal(s)/Objective(s) addressed: depression     Progress toward Treatment Goals: Mild improvement    Referral appointment(s) scheduled? No       Leonie Guillen L.C.S.W.    "

## 2022-08-01 ENCOUNTER — TELEMEDICINE (OUTPATIENT)
Dept: BEHAVIORAL HEALTH | Facility: CLINIC | Age: 67
End: 2022-08-01
Payer: MEDICARE

## 2022-08-01 DIAGNOSIS — F33.1 MODERATE EPISODE OF RECURRENT MAJOR DEPRESSIVE DISORDER (HCC): ICD-10-CM

## 2022-08-01 DIAGNOSIS — F10.10 ALCOHOL ABUSE, DAILY USE: ICD-10-CM

## 2022-08-01 PROCEDURE — 90837 PSYTX W PT 60 MINUTES: CPT | Mod: 95 | Performed by: SOCIAL WORKER

## 2022-08-01 NOTE — ASSESSMENT & PLAN NOTE
This is a chronic problem.  Patient reports his blood pressures been well controlled for multiple years on amlodipine-benazepril 5-20 mg daily.  His home blood pressures are 120-130/70-80. The patient denies chest pain, shortness of breath, headaches, vision changes, lightheadedness, dizziness, or medication side effects.    
This is a chronic problem.  The patient has been on Zoloft 200 mg daily for over 10 years.  We added Wellbutrin  mg daily about a month ago.  He does feel like his mood has improved slightly. He denies negative side effects. He has not scheduled an appointment with a counselor yet.  
This is a chronic problem.  The patient reports compliance with atorvastatin 10 mg nightly.     Lab Results   Component Value Date/Time    CHOLSTRLTOT 200 (H) 11/06/2020 09:56 AM    LDL 97 11/06/2020 09:56 AM    HDL 88 11/06/2020 09:56 AM    TRIGLYCERIDE 77 11/06/2020 09:56 AM         
Pt A+Ox3. Denies chest pain or SOB.

## 2022-08-01 NOTE — PROGRESS NOTES
"[facility]   Therapy Progress Note    This visit was conducted via Zoom using secure and encrypted videoconferencing technology.  The patient was in his home in the [facility].  The patient's identity was confirmed and verbal consent was obtained for this virtual visit.    Name: Dean Nobles  MRN: [id number]   : 1955  Age: 67 y.o.  Date of assessment: 2022  PCP: Leora Mcconnell M.D.  Persons in attendance: Patient  Total session time: 56 minutes    Topics addressed in psychotherapy include: Pt to indiv video appt. Pt reprots feeling \"depressed all the time. \" Clinical focus on treatment plan, goals, what to work towrds. Pt unable to define and seemingly apathetic to change, \"dont really care.\"  Will revisit goals at next appt. encouraged seeing PC/psy for med review. Drank 5 drinks at casino. Plan: see biweekly.      Objective Observations:   Participation:Active verbal participation, Engaged and Resistant   Grooming:Casual   Cognition:Fully Oriented   Eye Contact:Good   Mood:Euthymic   Affect:Flexible   Thought Process:Goal-directed   Speech:Rate within normal limits and Volume within normal limits    Current Risk:   Suicide: low   Homicide: NA   Self-Harm: NA   Relapse: NA   Safety Plan Reviewed: no    Care Plan Updated: No    Does patient express agreement with the above plan? Yes     Diagnosis:  1. Mod recurrent MDD    Therapeutic Intervention(s): Conflict clarification and Goal-setting    Treatment Goal(s)/Objective(s) addressed: depression     Progress toward Treatment Goals: No change    Referral appointment(s) scheduled? No       Leonie Guillen L.C.S.W.    "

## 2022-10-25 SDOH — ECONOMIC STABILITY: TRANSPORTATION INSECURITY
IN THE PAST 12 MONTHS, HAS LACK OF TRANSPORTATION KEPT YOU FROM MEETINGS, WORK, OR FROM GETTING THINGS NEEDED FOR DAILY LIVING?: NO

## 2022-10-25 SDOH — HEALTH STABILITY: MENTAL HEALTH
STRESS IS WHEN SOMEONE FEELS TENSE, NERVOUS, ANXIOUS, OR CAN'T SLEEP AT NIGHT BECAUSE THEIR MIND IS TROUBLED. HOW STRESSED ARE YOU?: TO SOME EXTENT

## 2022-10-25 SDOH — HEALTH STABILITY: PHYSICAL HEALTH: ON AVERAGE, HOW MANY DAYS PER WEEK DO YOU ENGAGE IN MODERATE TO STRENUOUS EXERCISE (LIKE A BRISK WALK)?: 1 DAY

## 2022-10-25 SDOH — ECONOMIC STABILITY: FOOD INSECURITY: WITHIN THE PAST 12 MONTHS, YOU WORRIED THAT YOUR FOOD WOULD RUN OUT BEFORE YOU GOT MONEY TO BUY MORE.: NEVER TRUE

## 2022-10-25 SDOH — ECONOMIC STABILITY: INCOME INSECURITY: HOW HARD IS IT FOR YOU TO PAY FOR THE VERY BASICS LIKE FOOD, HOUSING, MEDICAL CARE, AND HEATING?: NOT VERY HARD

## 2022-10-25 SDOH — ECONOMIC STABILITY: FOOD INSECURITY: WITHIN THE PAST 12 MONTHS, THE FOOD YOU BOUGHT JUST DIDN'T LAST AND YOU DIDN'T HAVE MONEY TO GET MORE.: NEVER TRUE

## 2022-10-25 SDOH — ECONOMIC STABILITY: INCOME INSECURITY: IN THE LAST 12 MONTHS, WAS THERE A TIME WHEN YOU WERE NOT ABLE TO PAY THE MORTGAGE OR RENT ON TIME?: PATIENT REFUSED

## 2022-10-25 SDOH — ECONOMIC STABILITY: TRANSPORTATION INSECURITY
IN THE PAST 12 MONTHS, HAS LACK OF RELIABLE TRANSPORTATION KEPT YOU FROM MEDICAL APPOINTMENTS, MEETINGS, WORK OR FROM GETTING THINGS NEEDED FOR DAILY LIVING?: NO

## 2022-10-25 SDOH — HEALTH STABILITY: PHYSICAL HEALTH: ON AVERAGE, HOW MANY MINUTES DO YOU ENGAGE IN EXERCISE AT THIS LEVEL?: 10 MIN

## 2022-10-25 SDOH — ECONOMIC STABILITY: HOUSING INSECURITY
IN THE LAST 12 MONTHS, WAS THERE A TIME WHEN YOU DID NOT HAVE A STEADY PLACE TO SLEEP OR SLEPT IN A SHELTER (INCLUDING NOW)?: NO

## 2022-10-25 SDOH — ECONOMIC STABILITY: HOUSING INSECURITY: IN THE LAST 12 MONTHS, HOW MANY PLACES HAVE YOU LIVED?: 1

## 2022-10-25 SDOH — ECONOMIC STABILITY: TRANSPORTATION INSECURITY
IN THE PAST 12 MONTHS, HAS THE LACK OF TRANSPORTATION KEPT YOU FROM MEDICAL APPOINTMENTS OR FROM GETTING MEDICATIONS?: NO

## 2022-10-25 SDOH — ECONOMIC STABILITY: HOUSING INSECURITY
IN THE LAST 12 MONTHS, WAS THERE A TIME WHEN YOU DID NOT HAVE A STEADY PLACE TO SLEEP OR SLEPT IN A SHELTER (INCLUDING NOW)?: PATIENT REFUSED

## 2022-10-25 ASSESSMENT — SOCIAL DETERMINANTS OF HEALTH (SDOH)
DO YOU BELONG TO ANY CLUBS OR ORGANIZATIONS SUCH AS CHURCH GROUPS UNIONS, FRATERNAL OR ATHLETIC GROUPS, OR SCHOOL GROUPS?: NO
DO YOU BELONG TO ANY CLUBS OR ORGANIZATIONS SUCH AS CHURCH GROUPS UNIONS, FRATERNAL OR ATHLETIC GROUPS, OR SCHOOL GROUPS?: NO
WITHIN THE PAST 12 MONTHS, YOU WORRIED THAT YOUR FOOD WOULD RUN OUT BEFORE YOU GOT THE MONEY TO BUY MORE: NEVER TRUE
HOW OFTEN DO YOU ATTENT MEETINGS OF THE CLUB OR ORGANIZATION YOU BELONG TO?: NEVER
HOW OFTEN DO YOU ATTENT MEETINGS OF THE CLUB OR ORGANIZATION YOU BELONG TO?: NEVER
HOW OFTEN DO YOU GET TOGETHER WITH FRIENDS OR RELATIVES?: NEVER
IN A TYPICAL WEEK, HOW MANY TIMES DO YOU TALK ON THE PHONE WITH FAMILY, FRIENDS, OR NEIGHBORS?: THREE TIMES A WEEK
HOW MANY DRINKS CONTAINING ALCOHOL DO YOU HAVE ON A TYPICAL DAY WHEN YOU ARE DRINKING: 3 OR 4
IN A TYPICAL WEEK, HOW MANY TIMES DO YOU TALK ON THE PHONE WITH FAMILY, FRIENDS, OR NEIGHBORS?: THREE TIMES A WEEK
HOW OFTEN DO YOU GET TOGETHER WITH FRIENDS OR RELATIVES?: NEVER
HOW OFTEN DO YOU HAVE A DRINK CONTAINING ALCOHOL: 4 OR MORE TIMES A WEEK
HOW OFTEN DO YOU HAVE SIX OR MORE DRINKS ON ONE OCCASION: MONTHLY
HOW OFTEN DO YOU ATTEND CHURCH OR RELIGIOUS SERVICES?: NEVER
HOW OFTEN DO YOU ATTEND CHURCH OR RELIGIOUS SERVICES?: NEVER
HOW HARD IS IT FOR YOU TO PAY FOR THE VERY BASICS LIKE FOOD, HOUSING, MEDICAL CARE, AND HEATING?: NOT VERY HARD

## 2022-10-25 ASSESSMENT — LIFESTYLE VARIABLES
HOW MANY STANDARD DRINKS CONTAINING ALCOHOL DO YOU HAVE ON A TYPICAL DAY: 3 OR 4
HOW OFTEN DO YOU HAVE SIX OR MORE DRINKS ON ONE OCCASION: MONTHLY
AUDIT-C TOTAL SCORE: 7
SKIP TO QUESTIONS 9-10: 0
HOW OFTEN DO YOU HAVE A DRINK CONTAINING ALCOHOL: 4 OR MORE TIMES A WEEK

## 2022-10-26 ENCOUNTER — OFFICE VISIT (OUTPATIENT)
Dept: MEDICAL GROUP | Facility: MEDICAL CENTER | Age: 67
End: 2022-10-26
Payer: MEDICARE

## 2022-10-26 VITALS
HEART RATE: 60 BPM | TEMPERATURE: 97.2 F | HEIGHT: 71 IN | DIASTOLIC BLOOD PRESSURE: 68 MMHG | RESPIRATION RATE: 14 BRPM | BODY MASS INDEX: 33.04 KG/M2 | OXYGEN SATURATION: 95 % | SYSTOLIC BLOOD PRESSURE: 118 MMHG | WEIGHT: 236 LBS

## 2022-10-26 DIAGNOSIS — F33.1 MODERATE EPISODE OF RECURRENT MAJOR DEPRESSIVE DISORDER (HCC): ICD-10-CM

## 2022-10-26 DIAGNOSIS — H40.89 OTHER GLAUCOMA OF BOTH EYES: ICD-10-CM

## 2022-10-26 DIAGNOSIS — I10 HTN (HYPERTENSION), BENIGN: Chronic | ICD-10-CM

## 2022-10-26 DIAGNOSIS — I10 ESSENTIAL HYPERTENSION: ICD-10-CM

## 2022-10-26 DIAGNOSIS — Z00.00 ENCOUNTER FOR MEDICARE ANNUAL WELLNESS EXAM: ICD-10-CM

## 2022-10-26 DIAGNOSIS — K22.719 BARRETT'S ESOPHAGUS WITH DYSPLASIA: ICD-10-CM

## 2022-10-26 DIAGNOSIS — Z12.5 PROSTATE CANCER SCREENING: ICD-10-CM

## 2022-10-26 PROCEDURE — G0439 PPPS, SUBSEQ VISIT: HCPCS | Performed by: FAMILY MEDICINE

## 2022-10-26 RX ORDER — ESCITALOPRAM OXALATE 10 MG/1
10 TABLET ORAL DAILY
Qty: 90 TABLET | Refills: 3 | Status: SHIPPED | OUTPATIENT
Start: 2022-10-26 | End: 2023-10-16 | Stop reason: SDUPTHER

## 2022-10-26 RX ORDER — ESCITALOPRAM OXALATE 20 MG/1
20 TABLET ORAL DAILY
Qty: 90 TABLET | Refills: 3 | Status: SHIPPED | OUTPATIENT
Start: 2022-10-26 | End: 2023-10-16 | Stop reason: SDUPTHER

## 2022-10-26 RX ORDER — AMLODIPINE BESYLATE AND BENAZEPRIL HYDROCHLORIDE 5; 20 MG/1; MG/1
1 CAPSULE ORAL
Qty: 90 CAPSULE | Refills: 3 | Status: SHIPPED | OUTPATIENT
Start: 2022-10-26 | End: 2023-03-22

## 2022-10-26 ASSESSMENT — FIBROSIS 4 INDEX: FIB4 SCORE: 1.46

## 2022-10-26 ASSESSMENT — PATIENT HEALTH QUESTIONNAIRE - PHQ9: CLINICAL INTERPRETATION OF PHQ2 SCORE: 0

## 2022-10-26 ASSESSMENT — ENCOUNTER SYMPTOMS: GENERAL WELL-BEING: GOOD

## 2022-10-26 ASSESSMENT — ACTIVITIES OF DAILY LIVING (ADL): BATHING_REQUIRES_ASSISTANCE: 0

## 2022-10-26 NOTE — PROGRESS NOTES
Chief Complaint   Patient presents with    Annual Exam       HPI:  Dean Nobles is a 67 y.o. here for Medicare Annual Wellness Visit     Patient Active Problem List    Diagnosis Date Noted    Other specified glaucoma 10/26/2022    Blood in stool 12/07/2021    Rash 02/09/2021    Pro's esophagus 10/16/2020    Non morbid obesity due to excess calories 01/30/2017    Essential hypertension 01/05/2012    Dyslipidemia 01/05/2012    Moderate episode of recurrent major depressive disorder (HCC) 01/05/2012    BPH (benign prostatic hyperplasia) 01/05/2012    ADD (attention deficit disorder) 01/05/2012    ED (erectile dysfunction) 01/05/2012       Current Outpatient Medications   Medication Sig Dispense Refill    escitalopram (LEXAPRO) 10 MG Tab Take 1 Tablet by mouth every day. 90 Tablet 3    escitalopram (LEXAPRO) 20 MG tablet Take 1 Tablet by mouth every day. 90 Tablet 3    amlodipine-benazepril (LOTREL) 5-20 MG per capsule Take 1 Capsule by mouth every day. 90 Capsule 3    atorvastatin (LIPITOR) 10 MG Tab TAKE 1 TABLET BY MOUTH DAILY 90 Tablet 3    latanoprost (XALATAN) 0.005 % Solution       timolol (TIMOPTIC) 0.5 % Solution       tadalafil (CIALIS) 10 MG tablet Take 1 tablet by mouth as needed for Erectile Dysfunction. 10 tablet 3    omeprazole (PRILOSEC) 20 MG delayed-release capsule Take 1 Cap by mouth every day. 90 Cap 3     No current facility-administered medications for this visit.          Current supplements as per medication list.     Allergies: Patient has no known allergies.    Current social contact/activities: Dean spends time with his son and grandson     He  reports that he has quit smoking. His smoking use included cigarettes. He has never used smokeless tobacco. He reports current alcohol use of about 6.0 oz per week. He reports that he does not use drugs.  Counseling given: Not Answered  Tobacco comments: 16-30      ROS:    Gait: Uses no assistive device  Ostomy: No  Other tubes:  No  Amputations: No  Chronic oxygen use: No  Last eye exam: 10/2022  Wears hearing aids: No   : Denies any urinary leakage during the last 6 months    Screening:    Depression Screening  Little interest or pleasure in doing things?  0 - not at all  Feeling down, depressed , or hopeless? 0 - not at all  Patient Health Questionnaire Score: 0     If depressive symptoms identified deferred to follow up visit unless specifically addressed in assessment and plan.    Interpretation of PHQ-9 Total Score   Score Severity   1-4 No Depression   5-9 Mild Depression   10-14 Moderate Depression   15-19 Moderately Severe Depression   20-27 Severe Depression    Screening for Cognitive Impairment  Three Minute Recall (daughter, heaven, mountain) 2/3    Sawyer clock face with all 12 numbers and set the hands to show 10 past 11.  Yes    Cognitive concerns identified deferred for follow up unless specifically addressed in assessment and plan.    Fall Risk Assessment  Has the patient had two or more falls in the last year or any fall with injury in the last year?  No    Safety Assessment  Throw rugs on floor.  Yes  Handrails on all stairs.  Yes  Good lighting in all hallways.  Yes  Difficulty hearing.  Yes  Patient counseled about all safety risks that were identified.    Functional Assessment ADLs  Are there any barriers preventing you from cooking for yourself or meeting nutritional needs?  No.    Are there any barriers preventing you from driving safely or obtaining transportation?  No.    Are there any barriers preventing you from using a telephone or calling for help?  No.    Are there any barriers preventing you from shopping?  No.    Are there any barriers preventing you from taking care of your own finances?  No.    Are there any barriers preventing you from managing your medications?  No.    Are there any barriers preventing you from showering, bathing or dressing yourself?  No.    Are you currently engaging in any exercise or  physical activity?  Yes.     What is your perception of your health?  Good    Advance Care Planning  Do you have an Advance Directive, Living Will, Durable Power of , or POLST?  No, he is currently working on it with an                  Health Maintenance Summary            Overdue - IMM HEP B VACCINE (1 of 3 - Risk 3-dose series) Overdue - never done      No completion history exists for this topic.              Postponed - IMM ZOSTER VACCINES (1 of 2) Postponed until 10/21/2032      No completion history exists for this topic.              IMM DTaP/Tdap/Td Vaccine (2 - Td or Tdap) Next due on 1/21/2023 01/21/2013  Imm Admin: Tdap Vaccine              Annual Wellness Visit (Every 366 Days) Next due on 10/27/2023      10/26/2022  Visit Dx: Encounter for Medicare annual wellness exam    10/29/2019  Subsequent Annual Wellness Visit - Includes PPPS ()    10/29/2019  Visit Dx: Medicare annual wellness visit, subsequent    10/23/2018  Done              COLORECTAL CANCER SCREENING (COLONOSCOPY - Every 10 Years) Next due on 12/14/2028 12/14/2018  REFERRAL TO GI FOR COLONOSCOPY              ABDOMINAL AORTIC ANEURYSM (AAA) SCREEN  Completed      10/22/2019  US-ABDOMEN COMPLETE SURVEY              HEPATITIS C SCREENING  Completed      10/22/2019  HEP C VIRUS ANTIBODY              IMM PNEUMOCOCCAL VACCINE: 65+ Years (Series Information) Completed      11/05/2019  Imm Admin: Pneumococcal polysaccharide vaccine (PPSV-23)    10/22/2018  Imm Admin: Pneumococcal Conjugate Vaccine (Prevnar/PCV-13)              COVID-19 Vaccine (Series Information) Completed      10/04/2022  Imm Admin: MODERNA BIVALENT BOOSTER SARS-COV-2 VACCINE (6+)    06/01/2022  Imm Admin: MODERNA SARS-COV-2 VACCINE (12+)    10/26/2021  Imm Admin: PFIZER PURPLE CAP SARS-COV-2 VACCINATION (12+)    03/11/2021  Imm Admin: Ly SARS-CoV-2 Vaccine              IMM INFLUENZA (Series Information) Completed      10/11/2022  Outside  "Immunization: Influenza Quad Adjuvanted    10/12/2021  Imm Admin: Influenza, Unspecified - HISTORICAL DATA    10/15/2020  Imm Admin: Influenza Vaccine Adult HD    10/10/2019  Imm Admin: Influenza Vaccine Adult HD    10/03/2019  Imm Admin: Influenza Vaccine Quad Inj (Preserved)    Only the first 5 history entries have been loaded, but more history exists.              IMM MENINGOCOCCAL ACWY VACCINE (Series Information) Aged Out      No completion history exists for this topic.                    Patient Care Team:  Leora Mcconnell M.D. as PCP - General (Family Medicine)  Cori MCNULTY DDS        Social History     Tobacco Use    Smoking status: Former     Types: Cigarettes    Smokeless tobacco: Never    Tobacco comments:     16-30   Vaping Use    Vaping Use: Never used   Substance Use Topics    Alcohol use: Yes     Alcohol/week: 6.0 oz     Types: 12 Glasses of wine per week     Comment: a bottle of wine a day    Drug use: No     Family History   Problem Relation Age of Onset    Heart Disease Mother     Diabetes Mother         Type 2    Cancer Father         skin    Other Father         liver cirrhosis    Diabetes Sister     Hypertension Sister     Heart Disease Brother     Diabetes Sister     Hypertension Sister     Other Sister         Kidney failure     He  has a past medical history of ADD (attention deficit disorder) (1/5/2012), BPH (benign prostatic hyperplasia) (1/5/2012), Dyslipidemia (1/5/2012), ED (erectile dysfunction) (1/5/2012), and HTN (hypertension), benign (1/5/2012).   Past Surgical History:   Procedure Laterality Date    EYE SURGERY      12/3/21 Lazer surgery to releive pressure     LAMINOTOMY         Exam:   /68 (BP Location: Left arm, Patient Position: Sitting, BP Cuff Size: Adult)   Pulse 60   Temp 36.2 °C (97.2 °F) (Temporal)   Resp 14   Ht 1.803 m (5' 11\")   Wt 107 kg (236 lb)   SpO2 95%  Body mass index is 32.92 kg/m².    Hearing good.    Dentition good  Alert, oriented in no " acute distress.  Eye contact is good, speech goal directed, affect calm    Assessment and Plan. The following treatment and monitoring plan is recommended:      1. Encounter for Medicare annual wellness exam  Services suggested: No services needed at this time  Health Care Screening: Age-appropriate preventive services recommended by USPTF and ACIP covered by Medicare were discussed today. Services ordered if indicated and agreed upon by the patient.  Referrals offered: Community-based lifestyle interventions to reduce health risks and promote self-management and wellness, fall prevention, nutrition, physical activity, tobacco-use cessation, weight loss, and mental health services as per orders if indicated.    Discussion today about general wellness and lifestyle habits:    Prevent falls and reduce trip hazards; Cautioned about securing or removing rugs.  Have a working fire alarm and carbon monoxide detector;   Engage in regular physical activity and social activities     2. Essential hypertension  - amlodipine-benazepril (LOTREL) 5-20 MG per capsule; Take 1 Capsule by mouth every day.  Dispense: 90 Capsule; Refill: 3  - Comp Metabolic Panel; Future  - Lipid Profile; Future  - MICROALBUMIN CREAT RATIO URINE; Future  - CBC WITH DIFFERENTIAL; Future    3. Other glaucoma of both eyes  - following with ophthalmology     4. Moderate episode of recurrent major depressive disorder (HCC)  Stable, continue lexapro 30mg daily    6. Pro's esophagus with dysplasia  Following with GI, appointment in March, on omeprazole    7. Prostate cancer screening  - PROSTATE SPECIFIC AG SCREENING; Future    Other orders  - escitalopram (LEXAPRO) 10 MG Tab; Take 1 Tablet by mouth every day.  Dispense: 90 Tablet; Refill: 3  - escitalopram (LEXAPRO) 20 MG tablet; Take 1 Tablet by mouth every day.  Dispense: 90 Tablet; Refill: 3        Follow-up: Dean aware I am leaving [facility], discussed Dr. Sams and [facility]

## 2022-11-10 ENCOUNTER — PATIENT MESSAGE (OUTPATIENT)
Dept: HEALTH INFORMATION MANAGEMENT | Facility: OTHER | Age: 67
End: 2022-11-10

## 2022-12-16 ENCOUNTER — HOSPITAL ENCOUNTER (OUTPATIENT)
Dept: LAB | Facility: MEDICAL CENTER | Age: 67
End: 2022-12-16
Attending: FAMILY MEDICINE
Payer: MEDICARE

## 2022-12-16 DIAGNOSIS — I10 ESSENTIAL HYPERTENSION: ICD-10-CM

## 2022-12-16 DIAGNOSIS — Z12.5 PROSTATE CANCER SCREENING: ICD-10-CM

## 2022-12-16 DIAGNOSIS — I10 HTN (HYPERTENSION), BENIGN: Chronic | ICD-10-CM

## 2022-12-16 LAB
ALBUMIN SERPL BCP-MCNC: 4.5 G/DL (ref 3.2–4.9)
ALBUMIN/GLOB SERPL: 1.7 G/DL
ALP SERPL-CCNC: 58 U/L (ref 30–99)
ALT SERPL-CCNC: 28 U/L (ref 2–50)
ANION GAP SERPL CALC-SCNC: 10 MMOL/L (ref 7–16)
AST SERPL-CCNC: 20 U/L (ref 12–45)
BASOPHILS # BLD AUTO: 0.9 % (ref 0–1.8)
BASOPHILS # BLD: 0.05 K/UL (ref 0–0.12)
BILIRUB SERPL-MCNC: 0.6 MG/DL (ref 0.1–1.5)
BUN SERPL-MCNC: 12 MG/DL (ref 8–22)
CALCIUM ALBUM COR SERPL-MCNC: 9.2 MG/DL (ref 8.5–10.5)
CALCIUM SERPL-MCNC: 9.6 MG/DL (ref 8.5–10.5)
CHLORIDE SERPL-SCNC: 100 MMOL/L (ref 96–112)
CHOLEST SERPL-MCNC: 177 MG/DL (ref 100–199)
CO2 SERPL-SCNC: 27 MMOL/L (ref 20–33)
CREAT SERPL-MCNC: 0.75 MG/DL (ref 0.5–1.4)
CREAT UR-MCNC: 49.47 MG/DL
EOSINOPHIL # BLD AUTO: 0.13 K/UL (ref 0–0.51)
EOSINOPHIL NFR BLD: 2.3 % (ref 0–6.9)
ERYTHROCYTE [DISTWIDTH] IN BLOOD BY AUTOMATED COUNT: 42.8 FL (ref 35.9–50)
FASTING STATUS PATIENT QL REPORTED: NORMAL
GFR SERPLBLD CREATININE-BSD FMLA CKD-EPI: 97 ML/MIN/1.73 M 2
GLOBULIN SER CALC-MCNC: 2.7 G/DL (ref 1.9–3.5)
GLUCOSE SERPL-MCNC: 105 MG/DL (ref 65–99)
HCT VFR BLD AUTO: 45.8 % (ref 42–52)
HDLC SERPL-MCNC: 73 MG/DL
HGB BLD-MCNC: 15.8 G/DL (ref 14–18)
IMM GRANULOCYTES # BLD AUTO: 0.05 K/UL (ref 0–0.11)
IMM GRANULOCYTES NFR BLD AUTO: 0.9 % (ref 0–0.9)
LDLC SERPL CALC-MCNC: 91 MG/DL
LYMPHOCYTES # BLD AUTO: 1.17 K/UL (ref 1–4.8)
LYMPHOCYTES NFR BLD: 20.9 % (ref 22–41)
MCH RBC QN AUTO: 31.8 PG (ref 27–33)
MCHC RBC AUTO-ENTMCNC: 34.5 G/DL (ref 33.7–35.3)
MCV RBC AUTO: 92.2 FL (ref 81.4–97.8)
MICROALBUMIN UR-MCNC: <1.2 MG/DL
MICROALBUMIN/CREAT UR: NORMAL MG/G (ref 0–30)
MONOCYTES # BLD AUTO: 0.62 K/UL (ref 0–0.85)
MONOCYTES NFR BLD AUTO: 11.1 % (ref 0–13.4)
NEUTROPHILS # BLD AUTO: 3.59 K/UL (ref 1.82–7.42)
NEUTROPHILS NFR BLD: 63.9 % (ref 44–72)
NRBC # BLD AUTO: 0 K/UL
NRBC BLD-RTO: 0 /100 WBC
PLATELET # BLD AUTO: 292 K/UL (ref 164–446)
PMV BLD AUTO: 10.3 FL (ref 9–12.9)
POTASSIUM SERPL-SCNC: 4.3 MMOL/L (ref 3.6–5.5)
PROT SERPL-MCNC: 7.2 G/DL (ref 6–8.2)
PSA SERPL-MCNC: 1.98 NG/ML (ref 0–4)
RBC # BLD AUTO: 4.97 M/UL (ref 4.7–6.1)
SODIUM SERPL-SCNC: 137 MMOL/L (ref 135–145)
TRIGL SERPL-MCNC: 64 MG/DL (ref 0–149)
WBC # BLD AUTO: 5.6 K/UL (ref 4.8–10.8)

## 2022-12-16 PROCEDURE — 80061 LIPID PANEL: CPT

## 2022-12-16 PROCEDURE — 85025 COMPLETE CBC W/AUTO DIFF WBC: CPT

## 2022-12-16 PROCEDURE — 82570 ASSAY OF URINE CREATININE: CPT

## 2022-12-16 PROCEDURE — 82043 UR ALBUMIN QUANTITATIVE: CPT

## 2022-12-16 PROCEDURE — 84153 ASSAY OF PSA TOTAL: CPT

## 2022-12-16 PROCEDURE — 36415 COLL VENOUS BLD VENIPUNCTURE: CPT

## 2022-12-16 PROCEDURE — 80053 COMPREHEN METABOLIC PANEL: CPT

## 2022-12-23 ENCOUNTER — APPOINTMENT (OUTPATIENT)
Dept: RADIOLOGY | Facility: IMAGING CENTER | Age: 67
End: 2022-12-23
Attending: PHYSICIAN ASSISTANT
Payer: MEDICARE

## 2022-12-23 ENCOUNTER — OFFICE VISIT (OUTPATIENT)
Dept: URGENT CARE | Facility: CLINIC | Age: 67
End: 2022-12-23
Payer: MEDICARE

## 2022-12-23 VITALS
SYSTOLIC BLOOD PRESSURE: 136 MMHG | HEIGHT: 71 IN | OXYGEN SATURATION: 95 % | HEART RATE: 78 BPM | DIASTOLIC BLOOD PRESSURE: 82 MMHG | BODY MASS INDEX: 32.2 KG/M2 | WEIGHT: 230 LBS | TEMPERATURE: 98 F | RESPIRATION RATE: 16 BRPM

## 2022-12-23 DIAGNOSIS — S92.415A CLOSED NONDISPLACED FRACTURE OF PROXIMAL PHALANX OF LEFT GREAT TOE, INITIAL ENCOUNTER: Primary | ICD-10-CM

## 2022-12-23 DIAGNOSIS — S90.32XA CONTUSION OF LEFT FOOT, INITIAL ENCOUNTER: ICD-10-CM

## 2022-12-23 PROCEDURE — 73630 X-RAY EXAM OF FOOT: CPT | Mod: TC,LT | Performed by: PHYSICIAN ASSISTANT

## 2022-12-23 PROCEDURE — 99214 OFFICE O/P EST MOD 30 MIN: CPT | Performed by: PHYSICIAN ASSISTANT

## 2022-12-23 ASSESSMENT — FIBROSIS 4 INDEX: FIB4 SCORE: 0.89

## 2022-12-24 ASSESSMENT — ENCOUNTER SYMPTOMS
DIARRHEA: 0
CHILLS: 0
FEVER: 0
NAUSEA: 0
ABDOMINAL PAIN: 0
VOMITING: 0
HEADACHES: 0
MYALGIAS: 0
SORE THROAT: 0
CONSTIPATION: 0
COUGH: 0
SHORTNESS OF BREATH: 0
EYE PAIN: 0

## 2022-12-24 NOTE — PROGRESS NOTES
"Subjective:   Dean Nobles is a 67 y.o. male who presents for Foot Injury (X1day, left foot injury after fall, bruised, swelling)    67-year-old male had a stubbed toe*injury yesterday while descending stairs and ever since then he is noted left great toe pain, today became more concerned with bruising and pain with weightbearing.  Denies hitting his head or losing consciousness.  Denies numbness or tingling.    Review of Systems   Constitutional:  Negative for chills and fever.   HENT:  Negative for congestion, ear pain and sore throat.    Eyes:  Negative for pain.   Respiratory:  Negative for cough and shortness of breath.    Cardiovascular:  Negative for chest pain.   Gastrointestinal:  Negative for abdominal pain, constipation, diarrhea, nausea and vomiting.   Genitourinary:  Negative for dysuria.   Musculoskeletal:  Negative for myalgias.   Skin:  Negative for rash.   Neurological:  Negative for headaches.     Medications, Allergies, and current problem list reviewed today in Epic.     Objective:     /82 (BP Location: Left arm, Patient Position: Sitting, BP Cuff Size: Adult)   Pulse 78   Temp 36.7 °C (98 °F) (Temporal)   Resp 16   Ht 1.803 m (5' 11\")   Wt 104 kg (230 lb)   SpO2 95%     Physical Exam  Vitals reviewed.   Constitutional:       Appearance: Normal appearance.   HENT:      Head: Normocephalic and atraumatic.      Right Ear: External ear normal.      Left Ear: External ear normal.      Nose: Nose normal.      Mouth/Throat:      Mouth: Mucous membranes are moist.   Eyes:      Conjunctiva/sclera: Conjunctivae normal.   Cardiovascular:      Rate and Rhythm: Normal rate.   Pulmonary:      Effort: Pulmonary effort is normal.   Musculoskeletal:      Comments: Erythema with ecchymosis and TTP around left great toe MTP and proximal phalanx.  Decreased range of motion.  No proximal tenderness.  Brisk cap refill.  No subungual hematoma   Skin:     General: Skin is warm and dry.      Capillary " Refill: Capillary refill takes less than 2 seconds.   Neurological:      Mental Status: He is alert and oriented to person, place, and time.       RADIOLOGY RESULTS   DX-FOOT-COMPLETE 3+ LEFT    Result Date: 12/23/2022 12/23/2022 5:02 PM HISTORY/REASON FOR EXAM:  Pain/Deformity Following Trauma; left first mtp pain after crush TECHNIQUE/EXAM DESCRIPTION AND NUMBER OF VIEWS: 3 views of the LEFT foot. COMPARISON: FINDINGS: There is a nondisplaced fracture through the shaft of the first proximal phalanx. The fracture appears extra-articular. No dislocation identified.     There is a nondisplaced fracture involving the first proximal phalanx.           Assessment/Plan:     Diagnosis and associated orders:     1. Closed nondisplaced fracture of proximal phalanx of left great toe, initial encounter        2. Contusion of left foot, initial encounter  DX-FOOT-COMPLETE 3+ LEFT         Comments/MDM:     Advised susie taping and postop shoe.  No sign neurovascular compromise.  Appears minimally comminuted but there is no displacement thankfully.  Discussed that this will likely take 4 to 6 weeks of immobilization and would anticipate routine healing.  If not following anticipated trajectory recommend repeat evaluation however no strong indication for mandatory follow-up at this point.  Discussed ice elevation and anti-inflammatories         Differential diagnosis, natural history, supportive care, and indications for immediate follow-up discussed.    Advised the patient to follow-up with the primary care physician for recheck, reevaluation, and consideration of further management.    Please note that this dictation was created using voice recognition software. I have made a reasonable attempt to correct obvious errors, but I expect that there are errors of grammar and possibly content that I did not discover before finalizing the note.    This note was electronically signed by Rey Castro PA-C

## 2023-02-15 ENCOUNTER — OFFICE VISIT (OUTPATIENT)
Dept: MEDICAL GROUP | Facility: MEDICAL CENTER | Age: 68
End: 2023-02-15
Payer: MEDICARE

## 2023-02-15 VITALS
HEART RATE: 80 BPM | RESPIRATION RATE: 14 BRPM | SYSTOLIC BLOOD PRESSURE: 114 MMHG | BODY MASS INDEX: 33.04 KG/M2 | OXYGEN SATURATION: 95 % | TEMPERATURE: 98 F | HEIGHT: 71 IN | DIASTOLIC BLOOD PRESSURE: 72 MMHG | WEIGHT: 236 LBS

## 2023-02-15 DIAGNOSIS — K22.719 BARRETT'S ESOPHAGUS WITH DYSPLASIA: ICD-10-CM

## 2023-02-15 DIAGNOSIS — I49.9 CARDIAC ARRHYTHMIA, UNSPECIFIED CARDIAC ARRHYTHMIA TYPE: ICD-10-CM

## 2023-02-15 DIAGNOSIS — I48.20 CHRONIC ATRIAL FIBRILLATION (HCC): ICD-10-CM

## 2023-02-15 DIAGNOSIS — F33.1 MODERATE EPISODE OF RECURRENT MAJOR DEPRESSIVE DISORDER (HCC): ICD-10-CM

## 2023-02-15 DIAGNOSIS — N52.01 ERECTILE DYSFUNCTION DUE TO ARTERIAL INSUFFICIENCY: Chronic | ICD-10-CM

## 2023-02-15 DIAGNOSIS — I10 ESSENTIAL HYPERTENSION: ICD-10-CM

## 2023-02-15 DIAGNOSIS — Z23 NEED FOR VACCINATION: ICD-10-CM

## 2023-02-15 DIAGNOSIS — R00.0 TACHYCARDIA: ICD-10-CM

## 2023-02-15 DIAGNOSIS — E78.5 DYSLIPIDEMIA: Chronic | ICD-10-CM

## 2023-02-15 PROCEDURE — 90715 TDAP VACCINE 7 YRS/> IM: CPT | Performed by: STUDENT IN AN ORGANIZED HEALTH CARE EDUCATION/TRAINING PROGRAM

## 2023-02-15 PROCEDURE — 90471 IMMUNIZATION ADMIN: CPT | Performed by: STUDENT IN AN ORGANIZED HEALTH CARE EDUCATION/TRAINING PROGRAM

## 2023-02-15 PROCEDURE — 99214 OFFICE O/P EST MOD 30 MIN: CPT | Mod: 25 | Performed by: STUDENT IN AN ORGANIZED HEALTH CARE EDUCATION/TRAINING PROGRAM

## 2023-02-15 ASSESSMENT — PATIENT HEALTH QUESTIONNAIRE - PHQ9: CLINICAL INTERPRETATION OF PHQ2 SCORE: 0

## 2023-02-15 ASSESSMENT — ENCOUNTER SYMPTOMS
VOMITING: 0
CHILLS: 0
COUGH: 0
NAUSEA: 0
SHORTNESS OF BREATH: 0
ABDOMINAL PAIN: 0
FOCAL WEAKNESS: 0
FEVER: 0

## 2023-02-15 ASSESSMENT — FIBROSIS 4 INDEX: FIB4 SCORE: 0.89

## 2023-02-15 NOTE — PROGRESS NOTES
"Subjective:     CC: establish care    HPI:   Dean presents today with    Problem   Tachycardia    Patient was seen by GI on 2/6/23, during that visit GI noted tachycardia,  with irregular rhythm, patient was asymptomatic. GI recommended to see PCP     Chronic Atrial Fibrillation (Hcc)       Health Maintenance: Completed    ROS:  Review of Systems   Constitutional:  Negative for chills and fever.   Respiratory:  Negative for cough and shortness of breath.    Cardiovascular:  Positive for leg swelling. Negative for chest pain.   Gastrointestinal:  Negative for abdominal pain, nausea and vomiting.   Neurological:  Negative for focal weakness.     Objective:     Exam:  /72 (BP Location: Left arm, Patient Position: Sitting, BP Cuff Size: Adult)   Pulse 80   Temp 36.7 °C (98 °F) (Temporal)   Resp 14   Ht 1.803 m (5' 11\")   Wt 107 kg (236 lb)   SpO2 95%   BMI 32.92 kg/m²  Body mass index is 32.92 kg/m².    Physical Exam  Vitals reviewed.   HENT:      Head: Normocephalic.      Mouth/Throat:      Mouth: Mucous membranes are moist.      Pharynx: Oropharynx is clear.   Eyes:      Pupils: Pupils are equal, round, and reactive to light.   Cardiovascular:      Rate and Rhythm: Normal rate and regular rhythm.   Pulmonary:      Effort: Pulmonary effort is normal. No respiratory distress.      Breath sounds: No wheezing or rales.   Abdominal:      General: Abdomen is flat. Bowel sounds are normal. There is no distension.      Tenderness: There is no abdominal tenderness. There is no guarding.   Musculoskeletal:      Right lower leg: Edema present.      Left lower leg: Edema present.   Skin:     General: Skin is warm.   Neurological:      General: No focal deficit present.      Mental Status: He is alert and oriented to person, place, and time.     Labs: reviewed    Assessment & Plan:     67 y.o. male with the following -     1. Essential hypertension  Chronic, stable.   - amlodipine-benazepril 5-20 mg    2. " Dyslipidemia  Chronic, stable.  - atorvastatin 10 mg    3. Moderate episode of recurrent major depressive disorder (HCC)  Chronic, stable.  - escitalopram 30 mg    4. Pro's esophagus with dysplasia  Chronic, stable. Follows GI  - omeprazole 20 mg    5. Erectile dysfunction due to arterial insufficiency  Chronic, stable.  - tadalafil 10 mg prn    6. Need for vaccination  - Tdap Vaccine =>6YO IM    7. Tachycardia  8. Cardiac arrhythmia, unspecified cardiac arrhythmia type  9. Afib  Was noted during recent GI visit, patient had irregular heart rate with tachycardia 110. Patient was asymptomatic. Likely patient has paroxysmal Afib.  - TSH WITH REFLEX TO FT4; Future  - EKG showed afib, heart rate 86.  - HOLTER - Cardiology Performed (48HR); Future  - Basic Metabolic Panel; Future  - EC-ECHOCARDIOGRAM COMPLETE W/O CONT; Future  - CBC WITH DIFFERENTIAL; Future  - Comp Metabolic Panel; Future  - MAGNESIUM; Future  - apixaban (ELIQUIS) 5mg Tab; Take 1 Tablet by mouth 2 times a day for 90 days.  Dispense: 180 Tablet; Refill: 3        HCC Gap Form    Diagnosis to address: F33.1 - Moderate episode of recurrent major depressive disorder (HCC)  Assessment and plan: Chronic, stable. Continue with current defined treatment plan. Follow-up at least annually.  Last edited 02/15/23 14:59 PST by Yvrose Sams M.D.       I spent a total of 40 minutes with record review, exam, communication with the patient, communication with other providers, and documentation of this encounter.      Return in about 1 month (around 3/15/2023) for F/u labs, Med check.    Please note that this dictation was created using voice recognition software. I have made every reasonable attempt to correct obvious errors, but I expect that there are errors of grammar and possibly content that I did not discover before finalizing the note.

## 2023-02-17 ENCOUNTER — NON-PROVIDER VISIT (OUTPATIENT)
Dept: CARDIOLOGY | Facility: MEDICAL CENTER | Age: 68
End: 2023-02-17
Attending: STUDENT IN AN ORGANIZED HEALTH CARE EDUCATION/TRAINING PROGRAM
Payer: MEDICARE

## 2023-02-17 ENCOUNTER — TELEPHONE (OUTPATIENT)
Dept: HEALTH INFORMATION MANAGEMENT | Facility: OTHER | Age: 68
End: 2023-02-17
Payer: MEDICARE

## 2023-02-17 DIAGNOSIS — I49.1 APC (ATRIAL PREMATURE CONTRACTIONS): ICD-10-CM

## 2023-02-17 DIAGNOSIS — I49.3 PVC'S (PREMATURE VENTRICULAR CONTRACTIONS): ICD-10-CM

## 2023-02-17 DIAGNOSIS — I49.9 CARDIAC ARRHYTHMIA, UNSPECIFIED CARDIAC ARRHYTHMIA TYPE: ICD-10-CM

## 2023-02-17 DIAGNOSIS — I48.0 PAROXYSMAL ATRIAL FIBRILLATION (HCC): ICD-10-CM

## 2023-02-17 DIAGNOSIS — I47.10 PSVT (PAROXYSMAL SUPRAVENTRICULAR TACHYCARDIA) (HCC): ICD-10-CM

## 2023-02-17 NOTE — PROGRESS NOTES
Home enrollment completed in the 14 day Zio XT Holter monitoring program, per Yvrose Sams M.D.  Monitor will be shipped to patient via [employer].  >Pending EOS.

## 2023-02-21 ENCOUNTER — HOSPITAL ENCOUNTER (OUTPATIENT)
Dept: CARDIOLOGY | Facility: MEDICAL CENTER | Age: 68
End: 2023-02-21
Attending: STUDENT IN AN ORGANIZED HEALTH CARE EDUCATION/TRAINING PROGRAM
Payer: MEDICARE

## 2023-02-21 DIAGNOSIS — I49.9 CARDIAC ARRHYTHMIA, UNSPECIFIED CARDIAC ARRHYTHMIA TYPE: ICD-10-CM

## 2023-02-21 LAB
LV EJECT FRACT  99904: 60
LV EJECT FRACT MOD 2C 99903: 59.59
LV EJECT FRACT MOD 4C 99902: 60.85
LV EJECT FRACT MOD BP 99901: 60.32

## 2023-02-21 PROCEDURE — 93306 TTE W/DOPPLER COMPLETE: CPT | Mod: 26 | Performed by: INTERNAL MEDICINE

## 2023-02-21 PROCEDURE — 93306 TTE W/DOPPLER COMPLETE: CPT

## 2023-03-10 ENCOUNTER — HOSPITAL ENCOUNTER (OUTPATIENT)
Dept: LAB | Facility: MEDICAL CENTER | Age: 68
End: 2023-03-10
Attending: STUDENT IN AN ORGANIZED HEALTH CARE EDUCATION/TRAINING PROGRAM
Payer: MEDICARE

## 2023-03-10 DIAGNOSIS — I49.9 CARDIAC ARRHYTHMIA, UNSPECIFIED CARDIAC ARRHYTHMIA TYPE: ICD-10-CM

## 2023-03-10 LAB
ALBUMIN SERPL BCP-MCNC: 4.2 G/DL (ref 3.2–4.9)
ALBUMIN/GLOB SERPL: 1.6 G/DL
ALP SERPL-CCNC: 58 U/L (ref 30–99)
ALT SERPL-CCNC: 25 U/L (ref 2–50)
ANION GAP SERPL CALC-SCNC: 13 MMOL/L (ref 7–16)
AST SERPL-CCNC: 20 U/L (ref 12–45)
BASOPHILS # BLD AUTO: 0.7 % (ref 0–1.8)
BASOPHILS # BLD: 0.04 K/UL (ref 0–0.12)
BILIRUB SERPL-MCNC: 0.7 MG/DL (ref 0.1–1.5)
BUN SERPL-MCNC: 16 MG/DL (ref 8–22)
CALCIUM ALBUM COR SERPL-MCNC: 9.1 MG/DL (ref 8.5–10.5)
CALCIUM SERPL-MCNC: 9.3 MG/DL (ref 8.5–10.5)
CHLORIDE SERPL-SCNC: 98 MMOL/L (ref 96–112)
CO2 SERPL-SCNC: 24 MMOL/L (ref 20–33)
CREAT SERPL-MCNC: 0.73 MG/DL (ref 0.5–1.4)
EOSINOPHIL # BLD AUTO: 0.11 K/UL (ref 0–0.51)
EOSINOPHIL NFR BLD: 2 % (ref 0–6.9)
ERYTHROCYTE [DISTWIDTH] IN BLOOD BY AUTOMATED COUNT: 40.4 FL (ref 35.9–50)
GFR SERPLBLD CREATININE-BSD FMLA CKD-EPI: 98 ML/MIN/1.73 M 2
GLOBULIN SER CALC-MCNC: 2.6 G/DL (ref 1.9–3.5)
GLUCOSE SERPL-MCNC: 97 MG/DL (ref 65–99)
HCT VFR BLD AUTO: 44.2 % (ref 42–52)
HGB BLD-MCNC: 15.5 G/DL (ref 14–18)
IMM GRANULOCYTES # BLD AUTO: 0.02 K/UL (ref 0–0.11)
IMM GRANULOCYTES NFR BLD AUTO: 0.4 % (ref 0–0.9)
LYMPHOCYTES # BLD AUTO: 1.14 K/UL (ref 1–4.8)
LYMPHOCYTES NFR BLD: 20.8 % (ref 22–41)
MAGNESIUM SERPL-MCNC: 1.9 MG/DL (ref 1.5–2.5)
MCH RBC QN AUTO: 32.1 PG (ref 27–33)
MCHC RBC AUTO-ENTMCNC: 35.1 G/DL (ref 33.7–35.3)
MCV RBC AUTO: 91.5 FL (ref 81.4–97.8)
MONOCYTES # BLD AUTO: 0.52 K/UL (ref 0–0.85)
MONOCYTES NFR BLD AUTO: 9.5 % (ref 0–13.4)
NEUTROPHILS # BLD AUTO: 3.64 K/UL (ref 1.82–7.42)
NEUTROPHILS NFR BLD: 66.6 % (ref 44–72)
NRBC # BLD AUTO: 0 K/UL
NRBC BLD-RTO: 0 /100 WBC
PLATELET # BLD AUTO: 285 K/UL (ref 164–446)
PMV BLD AUTO: 10.9 FL (ref 9–12.9)
POTASSIUM SERPL-SCNC: 4.1 MMOL/L (ref 3.6–5.5)
PROT SERPL-MCNC: 6.8 G/DL (ref 6–8.2)
RBC # BLD AUTO: 4.83 M/UL (ref 4.7–6.1)
SODIUM SERPL-SCNC: 135 MMOL/L (ref 135–145)
TSH SERPL DL<=0.005 MIU/L-ACNC: 0.91 UIU/ML (ref 0.38–5.33)
WBC # BLD AUTO: 5.5 K/UL (ref 4.8–10.8)

## 2023-03-10 PROCEDURE — 85025 COMPLETE CBC W/AUTO DIFF WBC: CPT

## 2023-03-10 PROCEDURE — 83735 ASSAY OF MAGNESIUM: CPT | Mod: GA

## 2023-03-10 PROCEDURE — 84443 ASSAY THYROID STIM HORMONE: CPT

## 2023-03-10 PROCEDURE — 80053 COMPREHEN METABOLIC PANEL: CPT

## 2023-03-10 PROCEDURE — 36415 COLL VENOUS BLD VENIPUNCTURE: CPT | Mod: GA

## 2023-03-15 ENCOUNTER — TELEPHONE (OUTPATIENT)
Dept: CARDIOLOGY | Facility: MEDICAL CENTER | Age: 68
End: 2023-03-15
Payer: MEDICARE

## 2023-03-15 NOTE — TELEPHONE ENCOUNTER
----- Message from Oliverio Devlin M.D. sent at 3/13/2023  9:21 AM PDT -----  Regarding: new patient add on  Good morning!    Can we add on Mr. Nobles to my last slot tomorrow on the 14th or for a 10AM slot on Wednesday March 22nd please? Thank you!    -Oliverio

## 2023-03-16 ENCOUNTER — OFFICE VISIT (OUTPATIENT)
Dept: MEDICAL GROUP | Facility: MEDICAL CENTER | Age: 68
End: 2023-03-16
Payer: MEDICARE

## 2023-03-16 VITALS
HEIGHT: 71 IN | DIASTOLIC BLOOD PRESSURE: 70 MMHG | SYSTOLIC BLOOD PRESSURE: 118 MMHG | RESPIRATION RATE: 14 BRPM | BODY MASS INDEX: 33.46 KG/M2 | HEART RATE: 88 BPM | TEMPERATURE: 97.8 F | OXYGEN SATURATION: 95 % | WEIGHT: 239 LBS

## 2023-03-16 DIAGNOSIS — I48.20 CHRONIC ATRIAL FIBRILLATION (HCC): ICD-10-CM

## 2023-03-16 DIAGNOSIS — K22.719 BARRETT'S ESOPHAGUS WITH DYSPLASIA: ICD-10-CM

## 2023-03-16 DIAGNOSIS — F33.1 MODERATE EPISODE OF RECURRENT MAJOR DEPRESSIVE DISORDER (HCC): ICD-10-CM

## 2023-03-16 DIAGNOSIS — N52.01 ERECTILE DYSFUNCTION DUE TO ARTERIAL INSUFFICIENCY: Chronic | ICD-10-CM

## 2023-03-16 DIAGNOSIS — I10 ESSENTIAL HYPERTENSION: ICD-10-CM

## 2023-03-16 PROCEDURE — 99213 OFFICE O/P EST LOW 20 MIN: CPT | Performed by: STUDENT IN AN ORGANIZED HEALTH CARE EDUCATION/TRAINING PROGRAM

## 2023-03-16 ASSESSMENT — ENCOUNTER SYMPTOMS
CHILLS: 0
COUGH: 0
SHORTNESS OF BREATH: 1
FEVER: 0
PALPITATIONS: 0
FOCAL WEAKNESS: 0
VOMITING: 0
NAUSEA: 0
ABDOMINAL PAIN: 0

## 2023-03-16 ASSESSMENT — FIBROSIS 4 INDEX: FIB4 SCORE: .982456140350877193

## 2023-03-16 NOTE — PROGRESS NOTES
"Subjective:     CC: follow up labc    HPI:   Dean presents today with    Problem   Chronic Atrial Fibrillation (Hcc)    Chronic, stable     Pro's Esophagus    Chronic, stable     Essential Hypertension    Chronic, stable.     Moderate Episode of Recurrent Major Depressive Disorder (Hcc)    Chronic, stable     Ed (Erectile Dysfunction)    Chronic, stable         Health Maintenance: Completed    ROS:  Review of Systems   Constitutional:  Negative for chills and fever.   Respiratory:  Positive for shortness of breath. Negative for cough.    Cardiovascular:  Positive for leg swelling. Negative for chest pain and palpitations.   Gastrointestinal:  Negative for abdominal pain, nausea and vomiting.   Neurological:  Negative for focal weakness.     Objective:     Exam:  /70 (BP Location: Left arm, Patient Position: Sitting, BP Cuff Size: Adult)   Pulse 88   Temp 36.6 °C (97.8 °F) (Temporal)   Resp 14   Ht 1.803 m (5' 11\")   Wt 108 kg (239 lb)   SpO2 95%   BMI 33.33 kg/m²  Body mass index is 33.33 kg/m².    Physical Exam  Vitals reviewed.   HENT:      Head: Normocephalic.      Mouth/Throat:      Mouth: Mucous membranes are moist.      Pharynx: Oropharynx is clear.   Eyes:      Pupils: Pupils are equal, round, and reactive to light.   Cardiovascular:      Rate and Rhythm: Normal rate and regular rhythm.   Pulmonary:      Effort: Pulmonary effort is normal. No respiratory distress.      Breath sounds: No wheezing or rales.   Abdominal:      General: Abdomen is flat. Bowel sounds are normal. There is no distension.      Tenderness: There is no abdominal tenderness. There is no guarding.   Musculoskeletal:      Right lower leg: Edema present.      Left lower leg: Edema present.   Skin:     General: Skin is warm.   Neurological:      General: No focal deficit present.      Mental Status: He is alert and oriented to person, place, and time.     Labs: labs reviewed    Assessment & Plan:     68 y.o. male with the " following -       1. Chronic atrial fibrillation (HCC)  TSH 0.910. echo showed LVEF 55-60%, mildly dilated RV with mildly reduced systolic function, mild MR.  Zio patch reading - pending.  - apixaban 5 mg bid.  - REFERRAL TO CARDIOLOGY    2. Essential hypertension  Chronic, stable.  - amlodipine-benazepril 5-20 mg daily    3. Erectile dysfunction due to arterial insufficiency  Chronic, stable.  - tadalafil 10 mg prn    4. Moderate episode of recurrent major depressive disorder (HCC)  Chronic, stable.  - escitalopram 20 mg AM  - escitalopralm 10 mg PM    5. Pro's esophagus with dysplasia  Chronic, stable.  - omeprazole 20 mg daily        Return in about 6 months (around 9/16/2023).    Please note that this dictation was created using voice recognition software. I have made every reasonable attempt to correct obvious errors, but I expect that there are errors of grammar and possibly content that I did not discover before finalizing the note.

## 2023-03-17 ENCOUNTER — TELEPHONE (OUTPATIENT)
Dept: CARDIOLOGY | Facility: MEDICAL CENTER | Age: 68
End: 2023-03-17
Payer: MEDICARE

## 2023-03-17 NOTE — TELEPHONE ENCOUNTER
QUIANA        Caller: Dean Nobles      Topic/issue: Patient was returning a call that he received about scheduling an appointment as a new patient with Dr. Devlin. He was informed that there was an appointment that he could schedule on 3/22 and was calling back to confirm that he could       Callback Number: [phone]      Thank you    Timo CAROLINA

## 2023-03-22 ENCOUNTER — OFFICE VISIT (OUTPATIENT)
Dept: CARDIOLOGY | Facility: MEDICAL CENTER | Age: 68
End: 2023-03-22
Payer: MEDICARE

## 2023-03-22 VITALS
HEART RATE: 74 BPM | OXYGEN SATURATION: 96 % | BODY MASS INDEX: 33.88 KG/M2 | WEIGHT: 242 LBS | RESPIRATION RATE: 16 BRPM | HEIGHT: 71 IN | DIASTOLIC BLOOD PRESSURE: 84 MMHG | SYSTOLIC BLOOD PRESSURE: 126 MMHG

## 2023-03-22 DIAGNOSIS — G47.33 OBSTRUCTIVE SLEEP APNEA SYNDROME: ICD-10-CM

## 2023-03-22 DIAGNOSIS — I10 ESSENTIAL HYPERTENSION: ICD-10-CM

## 2023-03-22 DIAGNOSIS — E78.5 DYSLIPIDEMIA: Chronic | ICD-10-CM

## 2023-03-22 DIAGNOSIS — I48.0 PAROXYSMAL ATRIAL FIBRILLATION (HCC): ICD-10-CM

## 2023-03-22 DIAGNOSIS — E66.09 CLASS 1 OBESITY DUE TO EXCESS CALORIES WITHOUT SERIOUS COMORBIDITY WITH BODY MASS INDEX (BMI) OF 33.0 TO 33.9 IN ADULT: ICD-10-CM

## 2023-03-22 DIAGNOSIS — D68.69 SECONDARY HYPERCOAGULABLE STATE (HCC): ICD-10-CM

## 2023-03-22 DIAGNOSIS — M79.89 LEG SWELLING: ICD-10-CM

## 2023-03-22 PROBLEM — E66.811 CLASS 1 OBESITY DUE TO EXCESS CALORIES WITHOUT SERIOUS COMORBIDITY WITH BODY MASS INDEX (BMI) OF 33.0 TO 33.9 IN ADULT: Status: ACTIVE | Noted: 2017-01-30

## 2023-03-22 PROBLEM — R00.0 TACHYCARDIA: Status: RESOLVED | Noted: 2023-02-15 | Resolved: 2023-03-22

## 2023-03-22 PROCEDURE — 93248 EXT ECG>7D<15D REV&INTERPJ: CPT | Performed by: INTERNAL MEDICINE

## 2023-03-22 PROCEDURE — 99205 OFFICE O/P NEW HI 60 MIN: CPT | Performed by: INTERNAL MEDICINE

## 2023-03-22 RX ORDER — SPIRONOLACTONE 25 MG/1
25 TABLET ORAL DAILY
Qty: 90 TABLET | Refills: 3 | Status: SHIPPED | OUTPATIENT
Start: 2023-03-22 | End: 2023-06-26 | Stop reason: SDUPTHER

## 2023-03-22 RX ORDER — BENAZEPRIL HYDROCHLORIDE 20 MG/1
20 TABLET ORAL DAILY
Qty: 100 TABLET | Refills: 3 | Status: SHIPPED | OUTPATIENT
Start: 2023-03-22 | End: 2023-06-26 | Stop reason: SDUPTHER

## 2023-03-22 RX ORDER — DIGOXIN 125 MCG
125 TABLET ORAL NIGHTLY
Qty: 100 TABLET | Refills: 3 | Status: SHIPPED | OUTPATIENT
Start: 2023-03-22 | End: 2023-07-19

## 2023-03-22 ASSESSMENT — ENCOUNTER SYMPTOMS
HEARTBURN: 1
SHORTNESS OF BREATH: 1

## 2023-03-22 ASSESSMENT — FIBROSIS 4 INDEX: FIB4 SCORE: .982456140350877193

## 2023-03-22 NOTE — PROGRESS NOTES
Cardiology Initial Consultation Note    Date of note:    3/22/2023    Primary Care Provider: Yvrose Sams M.D.  Referring Provider: Yvrose Sams M.D.     Patient Name: Dean Nobles   YOB: 1955  MRN:              [id number]    Chief Complaint: dizziness    History of Present Illness: Dean Nobles is a 68 y.o. male whose current medical problems include hypertension, dyslipidemia, paroxysmal atrial fibrillation who is here for cardiac consultation for atrial fibrillation.    Noted to have tachycardia in his GI office, diagnosed with a fib. Started on eliquis, ziopatch ordered.     Ziopatch showed 80% a fib, lasting up to 6 days at a time, average rate 91 bpm. Occasionally symptomatic with mild severity dizziness.     He did have rheumatic fever as a child. Was told he had a murmur at some point.     Doesn't have a working BP cuff.     Hip and knee pain.     Does have some SOB when going up stairs, is able to shovel snow no issues. Not currently exercising.     Does have diagnosis of AUGUSTIN, s/p surgery as he didn't want to remain on CPAP, but does worry he has this again.     Review of Systems   HENT:  Positive for nosebleeds.    Cardiovascular:  Positive for leg swelling.   Respiratory:  Positive for shortness of breath.    Musculoskeletal:  Positive for joint pain.   Gastrointestinal:  Positive for heartburn.     + Depression.     All other systems reviewed and discussed using a comprehensive questionnaire and are negative.       Past Medical History:   Diagnosis Date    ADD (attention deficit disorder) 01/05/2012    Arrhythmia     BPH (benign prostatic hyperplasia) 01/05/2012    Dyslipidemia 01/05/2012    ED (erectile dysfunction) 01/05/2012    HTN (hypertension), benign 01/05/2012    Hypertension          Past Surgical History:   Procedure Laterality Date    EYE SURGERY      12/3/21 Lazer surgery to releive pressure     LAMINOTOMY           Current Outpatient Medications    Medication Sig Dispense Refill    apixaban (ELIQUIS) 5mg Tab Take 1 Tablet by mouth 2 times a day for 90 days. 180 Tablet 3    escitalopram (LEXAPRO) 10 MG Tab Take 1 Tablet by mouth every day. 90 Tablet 3    escitalopram (LEXAPRO) 20 MG tablet Take 1 Tablet by mouth every day. 90 Tablet 3    amlodipine-benazepril (LOTREL) 5-20 MG per capsule Take 1 Capsule by mouth every day. 90 Capsule 3    atorvastatin (LIPITOR) 10 MG Tab TAKE 1 TABLET BY MOUTH DAILY 90 Tablet 3    latanoprost (XALATAN) 0.005 % Solution       timolol (TIMOPTIC) 0.5 % Solution       tadalafil (CIALIS) 10 MG tablet Take 1 tablet by mouth as needed for Erectile Dysfunction. 10 tablet 3    omeprazole (PRILOSEC) 20 MG delayed-release capsule Take 1 Cap by mouth every day. 90 Cap 3     No current facility-administered medications for this visit.         No Known Allergies      Family History   Problem Relation Age of Onset    Hyperlipidemia Mother     Heart Disease Mother          of heart attack at age of 80    Diabetes Mother         Type 2    Cancer Father         skin    Other Father         liver cirrhosis    Diabetes Sister     Hypertension Sister     Diabetes Sister     Hypertension Sister     Other Sister         Kidney failure    Hyperlipidemia Brother     Heart Disease Brother         arrhysmia    Atrial fibrillation Brother     Ovarian Cancer Neg Hx     Tubal Cancer Neg Hx     Peritoneal Cancer Neg Hx     Colorectal Cancer Neg Hx     Breast Cancer Neg Hx     Stroke Neg Hx          Social History     Socioeconomic History    Marital status:      Spouse name: Not on file    Number of children: Not on file    Years of education: Not on file    Highest education level: Bachelor's degree (e.g., BA, AB, BS)   Occupational History    Not on file   Tobacco Use    Smoking status: Former     Packs/day: 2.00     Years: 15.00     Pack years: 30.00     Types: Cigarettes     Quit date:      Years since quittin.2    Smokeless tobacco:  "Never    Tobacco comments:     16-30   Vaping Use    Vaping Use: Never used   Substance and Sexual Activity    Alcohol use: Yes     Alcohol/week: 21.6 oz     Types: 36 Glasses of wine per week     Comment: daily vodka - 4-5 cocktailes or wine - 4 glasses    Drug use: No    Sexual activity: Yes     Partners: Female   Other Topics Concern    Not on file   Social History Narrative    Retired [employer], [employer] and all around the country.      Social Determinants of Health     Financial Resource Strain: Low Risk     Difficulty of Paying Living Expenses: Not very hard   Food Insecurity: No Food Insecurity    Worried About Running Out of Food in the Last Year: Never true    Ran Out of Food in the Last Year: Never true   Transportation Needs: No Transportation Needs    Lack of Transportation (Medical): No    Lack of Transportation (Non-Medical): No   Physical Activity: Insufficiently Active    Days of Exercise per Week: 1 day    Minutes of Exercise per Session: 10 min   Stress: Stress Concern Present    Feeling of Stress : To some extent   Social Connections: Moderately Isolated    Frequency of Communication with Friends and Family: Three times a week    Frequency of Social Gatherings with Friends and Family: Never    Attends Mu-ism Services: Never    Active Member of Clubs or Organizations: No    Attends Club or Organization Meetings: Never    Marital Status:    Intimate Partner Violence: Not on file   Housing Stability: Unknown    Unable to Pay for Housing in the Last Year: Patient refused    Number of Places Lived in the Last Year: 1    Unstable Housing in the Last Year: No         Physical Exam:  Ambulatory Vitals  /84 (BP Location: Left arm, Patient Position: Sitting)   Pulse 74   Resp 16   Ht 1.803 m (5' 11\")   Wt 110 kg (242 lb)   SpO2 96%    Oxygen Therapy:  Pulse Oximetry: 96 %  BP Readings from Last 4 Encounters:   03/22/23 126/84   03/16/23 118/70   02/15/23 114/72   12/23/22 136/82 "       Weight/BMI: Body mass index is 33.75 kg/m².  Wt Readings from Last 4 Encounters:   03/22/23 110 kg (242 lb)   03/16/23 108 kg (239 lb)   02/15/23 107 kg (236 lb)   12/23/22 104 kg (230 lb)     General: No apparent distress  Eyes: nl conjunctiva  ENT: OP covered by mask.   Neck: JVP 4 cm H2O, no carotid bruits  Lungs: normal respiratory effort, CTAB  Heart: irregular rhythm, regular rate, no murmurs, no rubs or gallops, 3+ edema bilateral lower extremities. No LV/RV heave on cardiac palpatation. 2+ bilateral radial pulses.   Abdomen: soft, non tender, non distended, no masses, normal bowel sounds.  No HSM.  Extremities/MSK: no clubbing, no cyanosis  Neurological: No focal sensory deficits  Psychiatric: Appropriate affect, A/O x 3, intact judgement and insight  Skin: Warm extremities      Lab Data Review:  Lab Results   Component Value Date/Time    CHOLSTRLTOT 177 12/16/2022 09:22 AM    LDL 91 12/16/2022 09:22 AM    HDL 73 12/16/2022 09:22 AM    TRIGLYCERIDE 64 12/16/2022 09:22 AM       Lab Results   Component Value Date/Time    SODIUM 135 03/10/2023 08:57 AM    POTASSIUM 4.1 03/10/2023 08:57 AM    CHLORIDE 98 03/10/2023 08:57 AM    CO2 24 03/10/2023 08:57 AM    GLUCOSE 97 03/10/2023 08:57 AM    BUN 16 03/10/2023 08:57 AM    CREATININE 0.73 03/10/2023 08:57 AM     Lab Results   Component Value Date/Time    ALKPHOSPHAT 58 03/10/2023 08:57 AM    ASTSGOT 20 03/10/2023 08:57 AM    ALTSGPT 25 03/10/2023 08:57 AM    TBILIRUBIN 0.7 03/10/2023 08:57 AM      Lab Results   Component Value Date/Time    WBC 5.5 03/10/2023 08:57 AM     No components found for: HBGA1C  No components found for: TROPONIN  No results found for: BNP      Cardiac Imaging and Procedures Review:    EKG dated 2/15/2023 : My personal interpretation is atrial fibrillation.     Echo dated 2/2023:   CONCLUSIONS  No prior study is available for comparison.   Patient in atrial fibrillation during the study, rates 80's bpm  Preserved LV systolic function  with estimated LVEF 55-60%  Mildly dilated RV with mildly reduced systolic function  Mild MR  No pericardial effusion  Normal IVC size  Mild pulmonary hypertension with estimated RVSP 35 mmHg      Exercise stress testing (2017):   Provider conclusions and analysis:   Adequate exercise capacity   No evidence of ischemic ECG changes at peak stress   At peak stress, rhythm is sinus tachycardia   No arrhythmia noted.     Patient exercised for 9 minutes, 47 seconds, and 10.1 METs.   100% of MPHR: 156   90% of MPHR: 140   85% of MPHR: 133   Peak Heart Rate Achieved: 144   92 % of MPHR.   Maximum /82 mmHg   Functional aerobic impairment: (-25) Active       Holter Monitor (3/2023):   CONCLUSIONS:   * 14 day ziopatch reviewed   * 9 patient triggers or symptoms reported, associated with atrial fibrillation rates between 73 and 141 beats per minute (bpm) as well as premature ventricular contractions (PVCs)   * 80% atrial fibrillation, longest duration 6 days, rates  bpm.   * Rare PACs/PVCs   * 1 episodes of asymptomatic brief paroxysmal supraventricular tachycardia up to 5beats.   * No ventricular tachycardia, high degree heart blocks, or pauses       Medical Decision Makin. Paroxysmal atrial fibrillation (HCC)  New diagnosis last month  -referral to EP, given the paroxysmal nature he would be a great potential candidate for ablation  -sleep study referral for AUGUSTIN -re-evaluation.   -continue eliquis 5mg PO bid, [id number] score of 2. May hold anticoagulation for 3 days prior to any procedure without bridge and restart as soon as possible after procedure.    -we did discuss avoiding CCBs due to baseline leg swelling on norvasc. We discussed metoprolol, however given potential effects on weight loss efforts as well as erectile dysfunction concerns, we opted for digoxin until he sees EP  -start digoxin 250mcg PO daily x 3 days, then 125mcg PO daily  -BMP, dig level in 1 week, will require close monitoring for  potential arrhythmogenic toxicity.     2. Essential hypertension  Advised he get new BP cuff  -start spironolactone 25mg PO daily  -stop norvasc  -continue benazepril 20mg PO daily  -blood tests in 1 week.     3. Class 1 obesity due to excess calories without serious comorbidity with body mass index (BMI) of 33.0 to 33.9 in adult  Discussed diet and exercise changes.     4. Dyslipidemia  Continue lipitor 10mg PO Daily for now, will consider CCS in the future to dictate how aggressively we should control cholesterol    5. Leg swelling  No evidence of heart failure on exam  -stop CCB  -start spironolactone  -CTM    6. Secondary hypercoagulable state (HCC)  Eliquis due to CVA risk with a fib.     7. AUGUSTIN -   Sleep medicine referral.     No follow-ups on file.      Oliverio Devlin MD, [facility]  [facility], [address]  [address] [address]  Phone: [phone]  Fax: [phone]

## 2023-03-22 NOTE — PATIENT INSTRUCTIONS
Please consider Omron blood pressure cuff.     Please start spironolactone 25mg once a day.     Please stop your benazepril combo pill and start benazepril 20mg once a day.     Please get non-fasting blood tests in 1 week.

## 2023-03-28 ENCOUNTER — OFFICE VISIT (OUTPATIENT)
Dept: SLEEP MEDICINE | Facility: MEDICAL CENTER | Age: 68
End: 2023-03-28
Attending: INTERNAL MEDICINE
Payer: MEDICARE

## 2023-03-28 VITALS
BODY MASS INDEX: 33.74 KG/M2 | HEIGHT: 71 IN | SYSTOLIC BLOOD PRESSURE: 148 MMHG | WEIGHT: 241 LBS | HEART RATE: 85 BPM | DIASTOLIC BLOOD PRESSURE: 90 MMHG | OXYGEN SATURATION: 95 % | RESPIRATION RATE: 18 BRPM

## 2023-03-28 DIAGNOSIS — N40.1 BENIGN PROSTATIC HYPERPLASIA WITH URINARY FREQUENCY: Chronic | ICD-10-CM

## 2023-03-28 DIAGNOSIS — I48.0 PAROXYSMAL ATRIAL FIBRILLATION (HCC): ICD-10-CM

## 2023-03-28 DIAGNOSIS — G47.30 SLEEP DISORDER BREATHING: Primary | ICD-10-CM

## 2023-03-28 DIAGNOSIS — E66.09 CLASS 1 OBESITY DUE TO EXCESS CALORIES WITHOUT SERIOUS COMORBIDITY WITH BODY MASS INDEX (BMI) OF 33.0 TO 33.9 IN ADULT: ICD-10-CM

## 2023-03-28 DIAGNOSIS — R35.0 BENIGN PROSTATIC HYPERPLASIA WITH URINARY FREQUENCY: Chronic | ICD-10-CM

## 2023-03-28 PROCEDURE — 99212 OFFICE O/P EST SF 10 MIN: CPT | Performed by: STUDENT IN AN ORGANIZED HEALTH CARE EDUCATION/TRAINING PROGRAM

## 2023-03-28 PROCEDURE — 99204 OFFICE O/P NEW MOD 45 MIN: CPT | Performed by: STUDENT IN AN ORGANIZED HEALTH CARE EDUCATION/TRAINING PROGRAM

## 2023-03-28 PROCEDURE — 99212 OFFICE O/P EST SF 10 MIN: CPT | Performed by: INTERNAL MEDICINE

## 2023-03-28 ASSESSMENT — FIBROSIS 4 INDEX: FIB4 SCORE: .982456140350877193

## 2023-03-28 NOTE — PROGRESS NOTES
[facility] Consult Note     Date: 3/28/2023 / Time: 9:51 AM      Thank you for requesting a sleep medicine consultation on Dean Nobles at the sleep center. Presents today with the chief complaints of snoring, frequent nocturnal awakenings, unrefreshing sleep. He is referred by Oliverio Devlin M.D.  [address] [place] [address] for evaluation and treatment of sleep disorder breathing .     HISTORY OF PRESENT ILLNESS:     Dean Nobles is a 68 y.o. male with MDD, GERD, dyslipidemia, obesity, history of obstructive sleep apnea and atrial fibrillation.  Presents to Sleep Clinic for evaluation of sleep.     He states he was recently diagnosed with atrial fibrillation in February of this year.  He has been evaluated by cardiology.  He did undergo an event monitor which showed he was in A-fib 80% of the time.  He is currently anticoagulated and on rate control.  He presents to sleep clinic today at the recommendation of his cardiologist.    He has a known history of obstructive sleep apnea diagnosed in 2000.  Following diagnosis he did try CPAP briefly but was finding that he was taking his mask off in the night.  He then underwent surgical intervention with a UPPP and septoplasty.  He states following surgery he felt that his sleep apnea improved and he did not pursue further treatment.    He believes his sleep apnea may have returned.  He is finding that he is snoring in his sleep.  His wife reports that he has choking gasping episodes and witnessed apneas.  He does wake up with a dry mouth.  He does grind his teeth at night, and have headaches in the morning.  He does not find his sleep restorative.  States he mainly sleeps on his side as sleeping on his back makes his breathing worse at night.    As per supplemental questionnaire to be scanned or imported into chart:    Jermyn Sleepiness Score: 10    Sleep Schedule  Bedtime: Weekday & Weekend 10pm  Wake time: Weekday &  "Weekend 7am  Sleep-onset latency: 5 min   Awakenings from sleep: 6-8  Difficulty falling back asleep: No  Bedroom partner: No  Naps: No     DAYTIME SYMPTOMS:   Excessive daytime sleepiness: No   Daytime fatigue: Yes  Difficulty concentrating: No   Memory problems: No   Irritability:No   Work/school performance issues: N/A retired   Sleepiness with driving: No   Caffeine/stimulant use: Yes  Alcohol use:Yes, How Many? 3 drinks a ngiht      SLEEP RELATED SYMPTOMS  Snoring: Yes  Witnessed apnea or gasping/choking: Yes, gasping in sleep   Dry mouth or mouth breathing: Yes  Sweating: No   Teeth grinding/biting: Yes  Morning headaches: Yes  Refreshed Upon Awakening: No      SLEEP RELATED BEHAVIORS:  Parasomnias (walking, talking, eating, violence): No   Leg kicking: No   Restless legs - \"urge to move\": No   Nightmares: Yes Recurrent: No   Dream enactment: No      NARCOLEPSY:  Cataplexy: No   Sleep paralysis: No   Sleep attacks: No   Hypnagogic/hypnopompic hallucinations: No     MEDICAL HISTORY  Past Medical History:   Diagnosis Date    ADD (attention deficit disorder) 2012    Arrhythmia     Atrial fibrillation (HCC)     BPH (benign prostatic hyperplasia) 2012    Chickenpox     Depression     Dyslipidemia 2012    ED (erectile dysfunction) 2012    Frequent urination     Armenian measles     Heartburn     HTN (hypertension), benign 2012    Hypertension     Influenza     Mumps     Rheumatic fever     Shortness of breath     Swelling of lower extremity     Vision loss     Wears glasses         SURGICAL HISTORY  Past Surgical History:   Procedure Laterality Date    EYE SURGERY      12/3/21 Lazer surgery to releive pressure     LAMINOTOMY          FAMILY HISTORY  Family History   Problem Relation Age of Onset    Hyperlipidemia Mother     Heart Disease Mother          of heart attack at age of 80    Diabetes Mother         Type 2    Cancer Father         skin    Other Father         liver cirrhosis "    Diabetes Sister     Hypertension Sister     Kidney Disease Sister     Diabetes Sister     Hypertension Sister     Other Sister         Kidney failure    Hyperlipidemia Brother     Heart Disease Brother         arrhysmia    Atrial fibrillation Brother     Sleep Apnea Brother     Ovarian Cancer Neg Hx     Tubal Cancer Neg Hx     Peritoneal Cancer Neg Hx     Colorectal Cancer Neg Hx     Breast Cancer Neg Hx     Stroke Neg Hx        SOCIAL HISTORY  Social History     Socioeconomic History    Marital status:     Highest education level: Bachelor's degree (e.g., BA, AB, BS)   Tobacco Use    Smoking status: Former     Packs/day: 2.00     Years: 15.00     Pack years: 30.00     Types: Cigarettes     Quit date:      Years since quittin.2    Smokeless tobacco: Never    Tobacco comments:     16-30   Vaping Use    Vaping Use: Never used   Substance and Sexual Activity    Alcohol use: Yes     Alcohol/week: 21.6 oz     Types: 36 Glasses of wine per week     Comment: daily vodka - 4-5 cocktailes or wine - 4 glasses    Drug use: No    Sexual activity: Yes     Partners: Female   Social History Narrative    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      Social Determinants of Health     Financial Resource Strain: Low Risk     Difficulty of Paying Living Expenses: Not very hard   Food Insecurity: No Food Insecurity    Worried About Running Out of Food in the Last Year: Never true    Ran Out of Food in the Last Year: Never true   Transportation Needs: No Transportation Needs    Lack of Transportation (Medical): No    Lack of Transportation (Non-Medical): No   Physical Activity: Insufficiently Active    Days of Exercise per Week: 1 day    Minutes of Exercise per Session: 10 min   Stress: Stress Concern Present    Feeling of Stress : To some extent   Social Connections: Moderately Isolated    Frequency of Communication with Friends and Family: Three times a week    Frequency of Social Gatherings  "with Friends and Family: Never    Attends Zoroastrianism Services: Never    Active Member of Clubs or Organizations: No    Attends Club or Organization Meetings: Never    Marital Status:    Housing Stability: Unknown    Unable to Pay for Housing in the Last Year: Patient refused    Number of Places Lived in the Last Year: 1    Unstable Housing in the Last Year: No        Occupation: Retired    CURRENT MEDICATIONS  Current Outpatient Medications   Medication Sig Dispense Refill    benazepril (LOTENSIN) 20 MG Tab Take 1 Tablet by mouth every day. 100 Tablet 3    spironolactone (ALDACTONE) 25 MG Tab Take 1 Tablet by mouth every day. 90 Tablet 3    digoxin (LANOXIN) 125 MCG Tab Take 1 Tablet by mouth every evening. Please take 250mcg x 3 days, then 125mcg daily. 100 Tablet 3    apixaban (ELIQUIS) 5mg Tab Take 1 Tablet by mouth 2 times a day for 90 days. 180 Tablet 3    escitalopram (LEXAPRO) 10 MG Tab Take 1 Tablet by mouth every day. 90 Tablet 3    escitalopram (LEXAPRO) 20 MG tablet Take 1 Tablet by mouth every day. 90 Tablet 3    atorvastatin (LIPITOR) 10 MG Tab TAKE 1 TABLET BY MOUTH DAILY 90 Tablet 3    latanoprost (XALATAN) 0.005 % Solution       timolol (TIMOPTIC) 0.5 % Solution       tadalafil (CIALIS) 10 MG tablet Take 1 tablet by mouth as needed for Erectile Dysfunction. 10 tablet 3    omeprazole (PRILOSEC) 20 MG delayed-release capsule Take 1 Cap by mouth every day. 90 Cap 3     No current facility-administered medications for this visit.       REVIEW OF SYSTEMS  Constitutional: Denies fevers, Denies weight changes  Ears/Nose/Throat/Mouth: Denies nasal congestion or sore throat   Cardiovascular: Denies chest pain  Respiratory: with exertion shortness of breath, Denies cough  Gastrointestinal/Hepatic: Denies nausea, vomiting  Sleep: see HPI    Physical Examination:  Vitals/ General Appearance:   Weight/BMI: Body mass index is 33.61 kg/m².  Pulse 85   Ht 1.803 m (5' 11\")   Wt 109 kg (241 lb)   SpO2 95% " "  Vitals:    03/28/23 0949   Pulse: 85   SpO2: 95%   Weight: 109 kg (241 lb)   Height: 1.803 m (5' 11\")       Pt. is alert and oriented to time, place and person. Cooperative and in no apparent distress.     Constitutional: Alert, no distress, well-groomed.  Skin: No rashes in visible areas.  Eye: Round. Conjunctiva clear, lids normal. No icterus.   ENT EXAM  Nasal alae/valves collapsible: No   Nasal septum deviation: No   Nasal turbinate hypertrophy: Left: Grade 1   Right: Grade 1  Hard palate narrow: No   Hard palate high: No   Soft palate/uvula (Mallampati score): 2, s/p uvulectomy   Tongue Scalloping: Yes  Retrognathia: No   Micrognathia: No   Cardiovascular:irregular rhythm, no murmus/gallops/rubs  Pulmonary:Clear to auscultation, No wheezes, No crackles.  Neurologic:Awake, alert and oriented x 3, Normal age appropriate gait, No involuntary motions.  Extremities: No clubbing, cyanosis, or edema       ASSESSMENT AND PLAN   Dean Nobles is a 68 y.o. male with MDD, GERD, dyslipidemia, obesity, history of obstructive sleep apnea and atrial fibrillation.  Presents to Sleep Clinic for evaluation of sleep.     1. Dean Nobles  has hx of Obstructive Sleep Apnea (AUGUSTIN). Dean Nobles has symptoms of snoring, choking/gasping during sleep, witnessed apnea, dry mouth, morning headaches, unrefreshed upon awakening. These  interfere with activities of daily living.   ESS 10  Pt has risk factors for AUGUSTIN include obesity, age,.     The pathophysiology of AUGUSTIN and the increased risk of cardiovascular morbidity from untreated AUGUSTIN is discussed in detail with the patient. He  also has HTN, atrial fibrillation, GERD which can be worsened by AUGUSTIN.      We have discussed diagnostic options including in-laboratory, attended polysomnography and home sleep testing. We have also discussed treatment options including airway pressurization, reconstructive otolaryngologic surgery, dental appliances and weight management.     " Subsequently,treatment options will be discussed based on the diagnostic study. Meanwhile, He is urged to avoid supine sleep, weight gain and alcoholic beverages since all of these can worsen AUGUSTIN. He is cautioned against drowsy driving. If He feels sleepy while driving, advised must pull over for a break/nap, rather than persist on the road, in the interest of Pt's own safety and that of others on the road.  Patient was having atrial fibrillation during exam today which was rate controlled.  Given history of atrial fibrillation patient would benefit from undergoing a in lab sleep study as he is in A-fib majority of the time which can throw off home sleep studies.    Plan  -  He  will be scheduled for an overnight PSG to assess sleep related breathing disorder.  - Follow up 1-2 weeks after sleep study to discuss results and treatment options moving forward   -Advised to reach out via MyChart or by phone with any questions or concerns.     2.  Regarding treatment of other past medical problems and general health maintenance,  Pt is urged to follow up with PCP.      Please note portions of this record was created using voice recognition software. I have made every reasonable attempt to correct obvious errors, but I expect that there are errors of grammar and possibly content I did not discover before finalizing the note.

## 2023-03-30 ENCOUNTER — HOSPITAL ENCOUNTER (OUTPATIENT)
Dept: LAB | Facility: MEDICAL CENTER | Age: 68
End: 2023-03-30
Attending: INTERNAL MEDICINE
Payer: MEDICARE

## 2023-03-30 DIAGNOSIS — I48.0 PAROXYSMAL ATRIAL FIBRILLATION (HCC): ICD-10-CM

## 2023-03-30 LAB
ANION GAP SERPL CALC-SCNC: 13 MMOL/L (ref 7–16)
BUN SERPL-MCNC: 16 MG/DL (ref 8–22)
CALCIUM SERPL-MCNC: 9.3 MG/DL (ref 8.5–10.5)
CHLORIDE SERPL-SCNC: 97 MMOL/L (ref 96–112)
CO2 SERPL-SCNC: 21 MMOL/L (ref 20–33)
CREAT SERPL-MCNC: 0.86 MG/DL (ref 0.5–1.4)
DIGOXIN SERPL-MCNC: 0.5 NG/ML (ref 0.8–2)
GFR SERPLBLD CREATININE-BSD FMLA CKD-EPI: 93 ML/MIN/1.73 M 2
GLUCOSE SERPL-MCNC: 92 MG/DL (ref 65–99)
POTASSIUM SERPL-SCNC: 4.4 MMOL/L (ref 3.6–5.5)
SODIUM SERPL-SCNC: 131 MMOL/L (ref 135–145)

## 2023-03-30 PROCEDURE — 80048 BASIC METABOLIC PNL TOTAL CA: CPT

## 2023-03-30 PROCEDURE — 36415 COLL VENOUS BLD VENIPUNCTURE: CPT

## 2023-03-30 PROCEDURE — 80162 ASSAY OF DIGOXIN TOTAL: CPT

## 2023-04-13 ENCOUNTER — SLEEP STUDY (OUTPATIENT)
Dept: SLEEP MEDICINE | Facility: MEDICAL CENTER | Age: 68
End: 2023-04-13
Attending: STUDENT IN AN ORGANIZED HEALTH CARE EDUCATION/TRAINING PROGRAM
Payer: MEDICARE

## 2023-04-13 DIAGNOSIS — G47.30 SLEEP DISORDER BREATHING: ICD-10-CM

## 2023-04-13 DIAGNOSIS — N40.1 BENIGN PROSTATIC HYPERPLASIA WITH URINARY FREQUENCY: Chronic | ICD-10-CM

## 2023-04-13 DIAGNOSIS — E66.09 CLASS 1 OBESITY DUE TO EXCESS CALORIES WITHOUT SERIOUS COMORBIDITY WITH BODY MASS INDEX (BMI) OF 33.0 TO 33.9 IN ADULT: ICD-10-CM

## 2023-04-13 DIAGNOSIS — R35.0 BENIGN PROSTATIC HYPERPLASIA WITH URINARY FREQUENCY: Chronic | ICD-10-CM

## 2023-04-13 DIAGNOSIS — I48.0 PAROXYSMAL ATRIAL FIBRILLATION (HCC): ICD-10-CM

## 2023-04-13 PROCEDURE — 95811 POLYSOM 6/>YRS CPAP 4/> PARM: CPT | Performed by: STUDENT IN AN ORGANIZED HEALTH CARE EDUCATION/TRAINING PROGRAM

## 2023-04-15 NOTE — PROCEDURES
MONTAGE: Standard  STUDY TYPE: Split Night  RECORDING TECHNIQUE:   After the scalp was prepared, gold plated electrodes were applied to the scalp according to the International 10-20 System. EEG (electroencephalogram) was continuously monitored from the O1-M2, O2-M1, C3-M2, C4-M1, F3-M2, and F4-M1. EOGs (electrooculograms) were monitored by electrodes placed at the left and right outer canthi. Chin EMG (electromyogram) was monitored by electrodes placed on the mentalis and sub-mentalis muscles. Nasal and oral airflow were monitored using a triple port thermocouple as well as oronasal pressure transducer. Respiratory effort was measured by inductive plethysmography technology employing abdominal and thoracic belts. Blood oxygen saturation and pulse were monitored by pulse oximetry. Heart rhythm was monitored by surface electrocardiogram. Leg EMG was studied using surface electrodes placed on left and right anterior tibialis. A microphone was used to monitor tracheal sounds and snoring. Body position was monitored and documented by technician observation.   SCORING CRITERIA:   A modification of the AASM manual for scoring of sleep and associated events was used. Obstructive apneas were scored by cessation of airflow for at least 10 seconds with continuing respiratory effort. Central apneas were scored by cessation of airflow for at least 10 seconds with no respiratory effort. Hypopneas were scored by a 30% or more reduction in airflow for at least 10 seconds accompanied by arterial oxygen desaturation of 3% or an arousal. For CMS (Medicare) patients, per AASM rule 1B, hypopneas are scored by 30% with mild reduction in airflow for at least 10 seconds accompanied by arterial saturation decreased at 4%.  DIAGNOSTIC  Study start time was 09:58:23 PM. Diagnostic recording time was 171 minutes with a total sleep time of 123 minutes resulting in a sleep efficiency of 71.93%%. Sleep latency from the start of the study was 10  minutes and the latency from sleep to REM was 00 minutes. In total,15 arousals were scored for an arousal index of 7.3.  Respiratory:  There were a total of 1 apneas consisting of 1 obstructive apneas, 0 mixed apneas, and 0 central apneas. A total of 113 hypopneas were scored. The apnea index was 0.49 per hour and the hypopnea index was 55.12 per hour resulting in an overall AHI of 55.61. AHI during rem was 0.0 and AHI while supine was 0.00.  Oximetry:  There was a mean oxygen saturation of 92.0%. The minimum oxygen saturation during NREM sleep was 85.0% and in REM was --. Time spent during sleep with oxygen saturations <88% was 14.6 minutes.   Cardiac:  The highest heart rate seen while awake was 93 BPM while the highest heart rate during sleep was 92 BPM with an average sleeping heart rate of 76 BPM.  Limb Movements:  There were a total of 236 PLMs during sleep, which resulted in a PLM index of 115.1. There were 7 PLMs associated with arousals which resulted in a PLMS arousal index of 3.4.  TREATMENT:  Treatment recording time was 5h 6.5m (306 minutes) with a total sleep time of 4h 36.0m (276 minutes) resulting in a sleep efficiency of 90.0%. Sleep latency from the start of treatment was 10 minutes and REM latency from sleep onset was 3h 48.0m. The patient had 30 arousals in total for an arousal index of 6.5.  Respiratory:   There were 1 apneas in total consisting of 1 obstructive apneas, 0 central apneas, and 0 mixed apneas for an apnea index of 0.22. The patient had 39 hypopneas in total, which resulted in a hypopnea index of 8.48. The overall AHI was 8.70, with a REM AHI of 5.41, and a supine AHI of 0.00.   Oximetry:  The mean SaO2 during treatment was 92.0%. The minimum oxygen saturation in NREM was 85.0 % and in REM was 87.0%. Patient spent 18.8 minutes of TST with SaO2 <88%.  Cardiac:  The highest heart rate during sleep was 92 BPM with an average sleeping heart rate of 77BPM.  Limb Movements:  There were a  total of 388 PLMS during titration sleep time that resulted in an index of 84.3. There were 14 PLMS associated with arousals. This resulted in a PLM arousal index of 3.0.  Titration: CPAP was tried from 6 cm H2O to 13 cm H2O. BPAP was tried at 16/12 cm H2O.  This was a fully attended sleep study. This test was technically adequate. This patient was titrated on CPAP starting at 6 cm of water pressure. Patient was titrated up to BiPAP 16/12 cm of water pressure. Patient did best at BiPAP 16/12 cm of water pressure. Patient spent 55 minutes at that pressure and their AHI was 4.36 which is considered treated obstructive sleep apnea.     Impression:  1.  Severe obstructive sleep apnea with an overall AHI of 55.61 events an hour  2.  Patient was tried on CPAP and BiPAP during treatment portion  3.  Appeared to do best with BiPAP therapy  4.  REM sleep was seen while on BiPAP only  5.  No supine REM sleep was seen during night of study  6.  Oxygen saturations appear to improve with BiPAP therapy.  Recommendations:  I recommend auto BiPAP of EPAP 12 IPAP 17 pressure support 4 cm .  Patient used a medium Simplus mask during night of study.     I also recommend 30 day compliance download to assess the efficacy to the recommended pressure, measure leak, apnea hypopnea index and compliance for further outpatient monitoring and management of PAP therapy. In some cases alternative treatment options may be proven effective in resolving sleep apnea. These options include upper airway surgery, the use of a dental orthotic, weight loss, or positional therapy. Clinical correlation is required. In general patients with sleep apnea are advised to avoid alcohol, sedatives and not to operate a motor vehicle while drowsy.  Untreated sleep apnea increases the risk for cardiovascular and neurovascular disease.

## 2023-04-24 ENCOUNTER — OFFICE VISIT (OUTPATIENT)
Dept: CARDIOLOGY | Facility: MEDICAL CENTER | Age: 68
End: 2023-04-24
Attending: INTERNAL MEDICINE
Payer: MEDICARE

## 2023-04-24 ENCOUNTER — TELEPHONE (OUTPATIENT)
Dept: CARDIOLOGY | Facility: MEDICAL CENTER | Age: 68
End: 2023-04-24

## 2023-04-24 VITALS
SYSTOLIC BLOOD PRESSURE: 140 MMHG | HEIGHT: 71 IN | HEART RATE: 76 BPM | OXYGEN SATURATION: 96 % | DIASTOLIC BLOOD PRESSURE: 88 MMHG | WEIGHT: 240 LBS | BODY MASS INDEX: 33.6 KG/M2 | RESPIRATION RATE: 16 BRPM

## 2023-04-24 DIAGNOSIS — F98.8 ATTENTION DEFICIT DISORDER (ADD) WITHOUT HYPERACTIVITY: Chronic | ICD-10-CM

## 2023-04-24 DIAGNOSIS — I48.0 PAROXYSMAL ATRIAL FIBRILLATION (HCC): ICD-10-CM

## 2023-04-24 DIAGNOSIS — I10 ESSENTIAL HYPERTENSION: ICD-10-CM

## 2023-04-24 DIAGNOSIS — Z79.01 ANTICOAGULATED: ICD-10-CM

## 2023-04-24 DIAGNOSIS — G47.33 OBSTRUCTIVE SLEEP APNEA SYNDROME: ICD-10-CM

## 2023-04-24 DIAGNOSIS — E78.5 DYSLIPIDEMIA: Chronic | ICD-10-CM

## 2023-04-24 DIAGNOSIS — D68.69 SECONDARY HYPERCOAGULABLE STATE (HCC): ICD-10-CM

## 2023-04-24 LAB — EKG IMPRESSION: NORMAL

## 2023-04-24 PROCEDURE — 99204 OFFICE O/P NEW MOD 45 MIN: CPT | Performed by: INTERNAL MEDICINE

## 2023-04-24 PROCEDURE — 93000 ELECTROCARDIOGRAM COMPLETE: CPT | Performed by: INTERNAL MEDICINE

## 2023-04-24 RX ORDER — FLECAINIDE ACETATE 150 MG/1
75 TABLET ORAL 2 TIMES DAILY
Qty: 90 TABLET | Refills: 3 | Status: SHIPPED | OUTPATIENT
Start: 2023-04-24 | End: 2023-09-13

## 2023-04-24 ASSESSMENT — FIBROSIS 4 INDEX: FIB4 SCORE: .982456140350877193

## 2023-04-24 NOTE — TELEPHONE ENCOUNTER
Johanna,    Can you please enter the orders for this procedure?    Afib Ablation w/EFE    Thank You,  Lizbet

## 2023-04-24 NOTE — PROGRESS NOTES
Chief Complaint   Patient presents with    New Patient     NP Dx:Paroxysmal atrial fibrillation        Subjective     Dean Nobles is a 68 y.o. male who presents today with history of paroxysmal atrial fibrillation with Zio patch 80%.  Symptomatic breathlessness.  Relatively normal echo.  Severe sleep apnea being fitted.  Thyroid levels are normal.  Some excess alcohol 4-5 drinks per day.  No smoking.  Obesity.  Has not tried antiarrhythmics.    Past Medical History:   Diagnosis Date    ADD (attention deficit disorder) 2012    Arrhythmia     Atrial fibrillation (HCC)     BPH (benign prostatic hyperplasia) 2012    Chickenpox     Depression     Dyslipidemia 2012    ED (erectile dysfunction) 2012    Frequent urination     [place] measles     Heartburn     HTN (hypertension), benign 2012    Hypertension     Influenza     Mumps     Rheumatic fever     Shortness of breath     Swelling of lower extremity     Vision loss     Wears glasses      Past Surgical History:   Procedure Laterality Date    EYE SURGERY      12/3/21 Lazer surgery to releive pressure     LAMINOTOMY       Family History   Problem Relation Age of Onset    Hyperlipidemia Mother     Heart Disease Mother          of heart attack at age of 80    Diabetes Mother         Type 2    Cancer Father         skin    Other Father         liver cirrhosis    Diabetes Sister     Hypertension Sister     Kidney Disease Sister     Diabetes Sister     Hypertension Sister     Other Sister         Kidney failure    Hyperlipidemia Brother     Heart Disease Brother         arrhysmia    Atrial fibrillation Brother     Sleep Apnea Brother     Ovarian Cancer Neg Hx     Tubal Cancer Neg Hx     Peritoneal Cancer Neg Hx     Colorectal Cancer Neg Hx     Breast Cancer Neg Hx     Stroke Neg Hx      Social History     Socioeconomic History    Marital status:      Spouse name: Not on file    Number of children: Not on file    Years of  education: Not on file    Highest education level: Bachelor's degree (e.g., BA, AB, BS)   Occupational History    Not on file   Tobacco Use    Smoking status: Former     Packs/day: 2.00     Years: 15.00     Pack years: 30.00     Types: Cigarettes     Quit date:      Years since quittin.3    Smokeless tobacco: Never    Tobacco comments:     16-30   Vaping Use    Vaping Use: Never used   Substance and Sexual Activity    Alcohol use: Yes     Alcohol/week: 21.6 oz     Types: 36 Glasses of wine per week     Comment: daily vodka - 4-5 cocktailes or wine - 4 glasses    Drug use: No    Sexual activity: Yes     Partners: Female   Other Topics Concern    Not on file   Social History Narrative    Retired [employer] Business, [place] and all [place].      Social Determinants of Health     Financial Resource Strain: Low Risk     Difficulty of Paying Living Expenses: Not very hard   Food Insecurity: No Food Insecurity    Worried About Running Out of Food in the Last Year: Never true    Ran Out of Food in the Last Year: Never true   Transportation Needs: No Transportation Needs    Lack of Transportation (Medical): No    Lack of Transportation (Non-Medical): No   Physical Activity: Insufficiently Active    Days of Exercise per Week: 1 day    Minutes of Exercise per Session: 10 min   Stress: Stress Concern Present    Feeling of Stress : To some extent   Social Connections: Moderately Isolated    Frequency of Communication with Friends and Family: Three times a week    Frequency of Social Gatherings with Friends and Family: Never    Attends Druze Services: Never    Active Member of Clubs or Organizations: No    Attends Club or Organization Meetings: Never    Marital Status:    Intimate Partner Violence: Not on file   Housing Stability: Unknown    Unable to Pay for Housing in the Last Year: Patient refused    Number of Places Lived in the Last Year: 1    Unstable Housing in the Last Year: No     No Known  "Allergies  Outpatient Encounter Medications as of 4/24/2023   Medication Sig Dispense Refill    flecainide (TAMBOCOR) 150 MG Tab Take 0.5 Tablets by mouth 2 times a day. 90 Tablet 3    benazepril (LOTENSIN) 20 MG Tab Take 1 Tablet by mouth every day. 100 Tablet 3    spironolactone (ALDACTONE) 25 MG Tab Take 1 Tablet by mouth every day. 90 Tablet 3    digoxin (LANOXIN) 125 MCG Tab Take 1 Tablet by mouth every evening. Please take 250mcg x 3 days, then 125mcg daily. 100 Tablet 3    apixaban (ELIQUIS) 5mg Tab Take 1 Tablet by mouth 2 times a day for 90 days. 180 Tablet 3    escitalopram (LEXAPRO) 10 MG Tab Take 1 Tablet by mouth every day. 90 Tablet 3    escitalopram (LEXAPRO) 20 MG tablet Take 1 Tablet by mouth every day. 90 Tablet 3    atorvastatin (LIPITOR) 10 MG Tab TAKE 1 TABLET BY MOUTH DAILY 90 Tablet 3    latanoprost (XALATAN) 0.005 % Solution       timolol (TIMOPTIC) 0.5 % Solution       tadalafil (CIALIS) 10 MG tablet Take 1 tablet by mouth as needed for Erectile Dysfunction. 10 tablet 3    omeprazole (PRILOSEC) 20 MG delayed-release capsule Take 1 Cap by mouth every day. 90 Cap 3     No facility-administered encounter medications on file as of 4/24/2023.     ROS           Objective     BP (!) 140/88 (BP Location: Left arm, Patient Position: Sitting, BP Cuff Size: Adult)   Pulse 76   Resp 16   Ht 1.803 m (5' 11\")   Wt 109 kg (240 lb)   SpO2 96%   BMI 33.47 kg/m²     Physical Exam  Constitutional:       Appearance: He is well-developed.   HENT:      Head: Normocephalic and atraumatic.   Cardiovascular:      Rate and Rhythm: Normal rate. Rhythm irregular.      Heart sounds: No murmur heard.    No friction rub. No gallop.   Pulmonary:      Effort: Pulmonary effort is normal.      Breath sounds: Normal breath sounds.   Abdominal:      Palpations: Abdomen is soft.   Musculoskeletal:         General: Normal range of motion.      Cervical back: Normal range of motion and neck supple.   Skin:     General: Skin " is warm and dry.   Neurological:      Mental Status: He is alert and oriented to person, place, and time.   Psychiatric:         Behavior: Behavior normal.         Thought Content: Thought content normal.         Judgment: Judgment normal.              Assessment & Plan     1. Paroxysmal atrial fibrillation (HCC)  EKG      2. Secondary hypercoagulable state (HCC)        3. Obstructive sleep apnea syndrome        4. Essential hypertension        5. Dyslipidemia        6. Attention deficit disorder (ADD) without hyperactivity        7. Anticoagulated            Medical Decision Making: Today's Assessment/Status/Plan:   1.  Paroxysmal atrial fibrillation with Zio patch showing 80%.  Probably would be best suited by catheter ablation.  We will try flecainide 75 twice daily.  Also get him scheduled for catheter ablation.  If he has significant improvement on meds ablation can be canceled.  2.  Excess alcohol use work on cutting back.  3.  Obesity work on weight loss.  4.  Sleep apnea awaiting CPAP.  5.  Anticoagulation continue..  6.  Follow-up with me in 2 months.  The risks, benefits,and alternatives to atrial fibrillation ablation with general anesthesia were discussed in great detail. Specific risks mentioned including bleeding, infection, arteriovenous fistula/pseudoaneurysm related to sheath placement, cardiac perforation with possible tamponade requiring pericardiocentesis or possible open heart surgery were discussed. In addition,we discussed atrial fibrillation ablation specific complications including pulmonary vein stenosis, left atrial perforation (2-4%), and nerve damage involving the recurrent laryngeal nerve, the vagus nerve with resulting gastroparesis, and the phrenic nerve.  Also mentioned was a 1/400 (0.25%) occurrence of atrial esophageal fistula, which is an abnormal communication between the esophagus and the left atrium; this complication is often fatal. Lastly the risks of death, myocardial  infarction, stroke, DVT and PE were discussed. The patient verbalized understanding of these potential complications and wishes to proceed with this procedure.  The risks, benefits, and alternatives to transesophageal echocardiogram with IV sedation were discussed with the patient in specific detail, including oropharyngeal and esophageal traumas including hoarseness and dysphagia after the procedure. Rare cases demonstrating serious or fatal complications associated with transesophageal echocardiogram have been reported in the adult population, including cardiac, pulmonary and bleeding complications in less than 1% of people. Patients with an identified intracardiac thrombus are at increased risk for embolic events and this appears to be reduced with anticoagulant therapy. The patient verbalized understandings about these  possible complications and wishes to proceed with this procedure

## 2023-04-25 NOTE — TELEPHONE ENCOUNTER
Called patient to schedule - he is unavailable to write down instructions at this time. He will call back to schedule

## 2023-04-27 ENCOUNTER — TELEPHONE (OUTPATIENT)
Dept: CARDIOLOGY | Facility: MEDICAL CENTER | Age: 68
End: 2023-04-27
Payer: MEDICARE

## 2023-04-27 NOTE — TELEPHONE ENCOUNTER
Patient is scheduled on 6-16-23 for an Afib ablation w/EFE w/anesthesia with . Patient was told to hold flecainide 2 days prior to procedure and to check in at 11:30 for a 1:30 procedure. Updated H&P to be done on admit by EP NP. Pre admit to call patient. Carto notified by email on 4-27-23.

## 2023-05-19 ENCOUNTER — OFFICE VISIT (OUTPATIENT)
Dept: SLEEP MEDICINE | Facility: MEDICAL CENTER | Age: 68
End: 2023-05-19
Attending: STUDENT IN AN ORGANIZED HEALTH CARE EDUCATION/TRAINING PROGRAM
Payer: MEDICARE

## 2023-05-19 VITALS
HEART RATE: 74 BPM | RESPIRATION RATE: 16 BRPM | DIASTOLIC BLOOD PRESSURE: 84 MMHG | OXYGEN SATURATION: 94 % | HEIGHT: 71 IN | SYSTOLIC BLOOD PRESSURE: 128 MMHG | WEIGHT: 234.7 LBS | BODY MASS INDEX: 32.86 KG/M2

## 2023-05-19 DIAGNOSIS — I48.0 PAROXYSMAL ATRIAL FIBRILLATION (HCC): ICD-10-CM

## 2023-05-19 DIAGNOSIS — G47.33 OSA (OBSTRUCTIVE SLEEP APNEA): Primary | ICD-10-CM

## 2023-05-19 PROCEDURE — 99213 OFFICE O/P EST LOW 20 MIN: CPT | Performed by: STUDENT IN AN ORGANIZED HEALTH CARE EDUCATION/TRAINING PROGRAM

## 2023-05-19 PROCEDURE — 3074F SYST BP LT 130 MM HG: CPT | Performed by: STUDENT IN AN ORGANIZED HEALTH CARE EDUCATION/TRAINING PROGRAM

## 2023-05-19 PROCEDURE — 3079F DIAST BP 80-89 MM HG: CPT | Performed by: STUDENT IN AN ORGANIZED HEALTH CARE EDUCATION/TRAINING PROGRAM

## 2023-05-19 PROCEDURE — 99212 OFFICE O/P EST SF 10 MIN: CPT | Performed by: STUDENT IN AN ORGANIZED HEALTH CARE EDUCATION/TRAINING PROGRAM

## 2023-05-19 ASSESSMENT — FIBROSIS 4 INDEX: FIB4 SCORE: .982456140350877193

## 2023-05-19 NOTE — PROGRESS NOTES
[facility] Follow-up Visit    CC: Follow-up to discuss sleep study results      HPI:  Dean Nobles is a 68 y.o.male  with MDD, GERD, dyslipidemia, obesity, atrial fibrillation, and obstructive sleep apnea.  Presents today to discuss sleep study results.    He reports night of the sleep study went okay.  He was able to get some sleep.  He did review the study results prior to today's visit.    He has a known history of obstructive sleep apnea and underwent a UPPP and septoplasty in the past.    At last visit it was discussed that he does snore in his sleep and his wife is reported that he has choking and gasping along with witnessed apneas.  He was noticing some daytime fatigue.      Sleep History  4/13/2023 PSG split-night study showed severe obstructive sleep apnea with an overall AHI of 55.6, minimal oxygen saturation of 85%, time at or below 88% saturation 14.6 minutes.  Patient was tried on CPAP and BiPAP during night of study.  Respiratory events and oxygen saturations improved with BiPAP therapy.  Recommended auto BiPAP EPAP 12 IPAP 17 pressure support 4    Patient Active Problem List    Diagnosis Date Noted    Anticoagulated 04/24/2023    Secondary hypercoagulable state (HCC) 03/22/2023    Obstructive sleep apnea syndrome 03/22/2023    Leg swelling 03/22/2023    Paroxysmal atrial fibrillation (HCC) 02/15/2023    Other specified glaucoma 10/26/2022    Blood in stool 12/07/2021    Rash 02/09/2021    Pro's esophagus 10/16/2020    Class 1 obesity due to excess calories without serious comorbidity with body mass index (BMI) of 33.0 to 33.9 in adult 01/30/2017    Essential hypertension 01/05/2012    Dyslipidemia 01/05/2012    Moderate episode of recurrent major depressive disorder (HCC) 01/05/2012    BPH (benign prostatic hyperplasia) 01/05/2012    ADD (attention deficit disorder) 01/05/2012    ED (erectile dysfunction) 01/05/2012       Past Medical History:   Diagnosis Date    ADD (attention  deficit disorder) 2012    Arrhythmia     Atrial fibrillation (HCC)     BPH (benign prostatic hyperplasia) 2012    Chickenpox     Depression     Dyslipidemia 2012    ED (erectile dysfunction) 2012    Frequent urination     Martiniquais measles     Heartburn     HTN (hypertension), benign 2012    Hypertension     Influenza     Mumps     Rheumatic fever     Shortness of breath     Swelling of lower extremity     Vision loss     Wears glasses         Past Surgical History:   Procedure Laterality Date    EYE SURGERY      12/3/21 Lazer surgery to releive pressure     LAMINOTOMY         Family History   Problem Relation Age of Onset    Hyperlipidemia Mother     Heart Disease Mother          of heart attack at age of 80    Diabetes Mother         Type 2    Cancer Father         skin    Other Father         liver cirrhosis    Diabetes Sister     Hypertension Sister     Kidney Disease Sister     Diabetes Sister     Hypertension Sister     Other Sister         Kidney failure    Hyperlipidemia Brother     Heart Disease Brother         arrhysmia    Atrial fibrillation Brother     Sleep Apnea Brother     Ovarian Cancer Neg Hx     Tubal Cancer Neg Hx     Peritoneal Cancer Neg Hx     Colorectal Cancer Neg Hx     Breast Cancer Neg Hx     Stroke Neg Hx        Social History     Socioeconomic History    Marital status:      Spouse name: Not on file    Number of children: Not on file    Years of education: Not on file    Highest education level: Bachelor's degree (e.g., BA, AB, BS)   Occupational History    Not on file   Tobacco Use    Smoking status: Former     Packs/day: 2.00     Years: 15.00     Pack years: 30.00     Types: Cigarettes     Quit date:      Years since quittin.4    Smokeless tobacco: Never    Tobacco comments:     16-30   Vaping Use    Vaping Use: Never used   Substance and Sexual Activity    Alcohol use: Yes     Alcohol/week: 21.6 oz     Types: 36 Glasses of wine per week      Comment: daily vodka - 4-5 cocktailes or wine - 4 glasses    Drug use: No    Sexual activity: Yes     Partners: Female   Other Topics Concern    Not on file   Social History Narrative    Retired [employer], [employer] and all around the country.      Social Determinants of Health     Financial Resource Strain: Low Risk  (10/25/2022)    Overall Financial Resource Strain (CARDIA)     Difficulty of Paying Living Expenses: Not very hard   Food Insecurity: No Food Insecurity (10/25/2022)    Hunger Vital Sign     Worried About Running Out of Food in the Last Year: Never true     Ran Out of Food in the Last Year: Never true   Transportation Needs: No Transportation Needs (10/25/2022)    PRAPARE - Transportation     Lack of Transportation (Medical): No     Lack of Transportation (Non-Medical): No   Physical Activity: Insufficiently Active (10/25/2022)    Exercise Vital Sign     Days of Exercise per Week: 1 day     Minutes of Exercise per Session: 10 min   Stress: Stress Concern Present (10/25/2022)    [employer] - Occupational Stress Questionnaire     Feeling of Stress : To some extent   Social Connections: Moderately Isolated (10/25/2022)    Social Connection and Isolation Panel [NHANES]     Frequency of Communication with Friends and Family: Three times a week     Frequency of Social Gatherings with Friends and Family: Never     Attends Anabaptism Services: Never     Active Member of Clubs or Organizations: No     Attends Club or Organization Meetings: Never     Marital Status:    Intimate Partner Violence: Not on file   Housing Stability: Unknown (10/25/2022)    Housing Stability Vital Sign     Unable to Pay for Housing in the Last Year: Patient refused     Number of Places Lived in the Last Year: 1     Unstable Housing in the Last Year: No       Current Outpatient Medications   Medication Sig Dispense Refill    flecainide (TAMBOCOR) 150 MG Tab Take 0.5 Tablets by mouth 2 times a  "day. 90 Tablet 3    benazepril (LOTENSIN) 20 MG Tab Take 1 Tablet by mouth every day. 100 Tablet 3    spironolactone (ALDACTONE) 25 MG Tab Take 1 Tablet by mouth every day. 90 Tablet 3    digoxin (LANOXIN) 125 MCG Tab Take 1 Tablet by mouth every evening. Please take 250mcg x 3 days, then 125mcg daily. 100 Tablet 3    escitalopram (LEXAPRO) 10 MG Tab Take 1 Tablet by mouth every day. 90 Tablet 3    escitalopram (LEXAPRO) 20 MG tablet Take 1 Tablet by mouth every day. 90 Tablet 3    atorvastatin (LIPITOR) 10 MG Tab TAKE 1 TABLET BY MOUTH DAILY 90 Tablet 3    latanoprost (XALATAN) 0.005 % Solution       timolol (TIMOPTIC) 0.5 % Solution       tadalafil (CIALIS) 10 MG tablet Take 1 tablet by mouth as needed for Erectile Dysfunction. 10 tablet 3    omeprazole (PRILOSEC) 20 MG delayed-release capsule Take 1 Cap by mouth every day. 90 Cap 3     No current facility-administered medications for this visit.        ALLERGIES: Patient has no known allergies.    ROS  Constitutional: Denies fevers, Denies weight changes  Ears/Nose/Throat/Mouth: Denies nasal congestion or sore throat   Cardiovascular: Denies chest pain  Respiratory: Denies shortness of breath, Denies cough  Gastrointestinal/Hepatic: Denies nausea, vomiting  Sleep: see HPI      PHYSICAL EXAM  /84 (BP Location: Left arm, Patient Position: Sitting, BP Cuff Size: Adult)   Pulse 74   Resp 16   Ht 1.803 m (5' 11\")   Wt 106 kg (234 lb 11.2 oz)   SpO2 94%   BMI 32.73 kg/m²   Appearance: Well-nourished, well-developed, no acute distress  Eyes:  No scleral icterus , EOMI  Musculoskeletal:  Grossly normal; gait and station normal; digits and nails normal  Skin:  No rashes, petechiae, cyanosis  Neurologic: without focal signs; oriented to person, time, place, and purpose; judgement intact      Medical Decision Making   Assessment and Plan  Dean Nobles is a 68 y.o.male  with MDD, GERD, dyslipidemia, obesity, atrial fibrillation, and obstructive sleep " apnea.  Presents today to discuss sleep study results.    The medical record was reviewed.    Obstructive sleep apnea   Reviewed recent PSG split-night with patient showing an AHI of 55.6,  and Min Oxygen saturation of 85%.  Time at or below 88% saturation 14.6 minutes  Based on sleep study and symptoms meets criteria for severe obstructive sleep apnea.   Patient responded well to BiPAP therapy during treatment portion of split-night study  We discussed the pathophysiology of obstructive sleep apnea (AUGUSTIN) and risk factors for the disease. We also discussed possible consequences of untreated AUGUSTIN, including excessive daytime sleepiness and fatigue, cognitive dysfunction, cardiovascular complications such as elevated blood pressure, heart attacks, cardiac arrhythmias, and strokes. We discussed how AUGUSTIN typically gets worse with age. We discussed treatment options for AUGUSTIN, including the gold standard therapy (PAP), alternative options such as a mandibular advancement device (custom-made oral appliances) and surgeries. We will proceed BiPAP therapy.     RECOMMENDATIONS  -Start Auto BiPAP EPAP 12 IPAP 17 pressure support 4  -Discussed importance of adherence/compliance   -Prescription generated for supplies   -Patient counseled to avoid driving when sleepy. Encouraged to anticipate sleepiness, consider taking a 10 min nap prior to driving, alternate with another , or pull over if sleepy while driving  -Advised to contact our office or myself with any questions via MyChart  -Follow up in 3 months or sooner if needed      Positive airway pressure will favorably impact many of the adverse conditions and effects provoked by AUGUSTIN.    Have advised the patient to follow up with the appropriate healthcare practitioners for all other medical problems and issues.    Return for 1-2 months after starting PAP therapy.      Please note portions of this record was created using voice recognition software. I have made every  reasonable attempt to correct obvious errors, but I expect that there are errors of grammar and possibly content I did not discover before finalizing the note.

## 2023-05-23 DIAGNOSIS — E78.5 DYSLIPIDEMIA: Chronic | ICD-10-CM

## 2023-05-24 RX ORDER — ATORVASTATIN CALCIUM 10 MG/1
10 TABLET, FILM COATED ORAL DAILY
Qty: 90 TABLET | Refills: 3 | Status: SHIPPED | OUTPATIENT
Start: 2023-05-24 | End: 2024-03-12 | Stop reason: SDUPTHER

## 2023-06-01 ENCOUNTER — APPOINTMENT (OUTPATIENT)
Dept: ADMISSIONS | Facility: MEDICAL CENTER | Age: 68
End: 2023-06-01
Attending: INTERNAL MEDICINE
Payer: MEDICARE

## 2023-06-07 ENCOUNTER — PRE-ADMISSION TESTING (OUTPATIENT)
Dept: ADMISSIONS | Facility: MEDICAL CENTER | Age: 68
End: 2023-06-07
Attending: INTERNAL MEDICINE
Payer: MEDICARE

## 2023-06-07 VITALS — WEIGHT: 230 LBS | HEIGHT: 71 IN | BODY MASS INDEX: 32.2 KG/M2

## 2023-06-07 DIAGNOSIS — Z01.812 PRE-OPERATIVE LABORATORY EXAMINATION: ICD-10-CM

## 2023-06-07 DIAGNOSIS — Z01.810 PRE-OPERATIVE CARDIOVASCULAR EXAMINATION: ICD-10-CM

## 2023-06-07 RX ORDER — APIXABAN 5 MG/1
5 TABLET, FILM COATED ORAL 2 TIMES DAILY
COMMUNITY
Start: 2023-05-17 | End: 2024-02-28 | Stop reason: SDUPTHER

## 2023-06-07 ASSESSMENT — FIBROSIS 4 INDEX: FIB4 SCORE: .982456140350877193

## 2023-06-13 ENCOUNTER — PRE-ADMISSION TESTING (OUTPATIENT)
Dept: ADMISSIONS | Facility: MEDICAL CENTER | Age: 68
End: 2023-06-13
Attending: INTERNAL MEDICINE
Payer: MEDICARE

## 2023-06-13 DIAGNOSIS — Z01.812 PRE-OPERATIVE LABORATORY EXAMINATION: ICD-10-CM

## 2023-06-13 LAB
ALBUMIN SERPL BCP-MCNC: 4.5 G/DL (ref 3.2–4.9)
ALBUMIN/GLOB SERPL: 2 G/DL
ALP SERPL-CCNC: 58 U/L (ref 30–99)
ALT SERPL-CCNC: 36 U/L (ref 2–50)
ANION GAP SERPL CALC-SCNC: 14 MMOL/L (ref 7–16)
AST SERPL-CCNC: 27 U/L (ref 12–45)
BASOPHILS # BLD AUTO: 0.6 % (ref 0–1.8)
BASOPHILS # BLD: 0.04 K/UL (ref 0–0.12)
BILIRUB SERPL-MCNC: 1 MG/DL (ref 0.1–1.5)
BUN SERPL-MCNC: 11 MG/DL (ref 8–22)
CALCIUM ALBUM COR SERPL-MCNC: 9 MG/DL (ref 8.5–10.5)
CALCIUM SERPL-MCNC: 9.4 MG/DL (ref 8.5–10.5)
CHLORIDE SERPL-SCNC: 99 MMOL/L (ref 96–112)
CO2 SERPL-SCNC: 23 MMOL/L (ref 20–33)
CREAT SERPL-MCNC: 0.88 MG/DL (ref 0.5–1.4)
EOSINOPHIL # BLD AUTO: 0.08 K/UL (ref 0–0.51)
EOSINOPHIL NFR BLD: 1.2 % (ref 0–6.9)
ERYTHROCYTE [DISTWIDTH] IN BLOOD BY AUTOMATED COUNT: 43.3 FL (ref 35.9–50)
GFR SERPLBLD CREATININE-BSD FMLA CKD-EPI: 92 ML/MIN/1.73 M 2
GLOBULIN SER CALC-MCNC: 2.3 G/DL (ref 1.9–3.5)
GLUCOSE SERPL-MCNC: 146 MG/DL (ref 65–99)
HCT VFR BLD AUTO: 44.9 % (ref 42–52)
HGB BLD-MCNC: 15.3 G/DL (ref 14–18)
IMM GRANULOCYTES # BLD AUTO: 0.03 K/UL (ref 0–0.11)
IMM GRANULOCYTES NFR BLD AUTO: 0.4 % (ref 0–0.9)
INR PPP: 1.31 (ref 0.87–1.13)
LYMPHOCYTES # BLD AUTO: 1.26 K/UL (ref 1–4.8)
LYMPHOCYTES NFR BLD: 18.5 % (ref 22–41)
MCH RBC QN AUTO: 31.9 PG (ref 27–33)
MCHC RBC AUTO-ENTMCNC: 34.1 G/DL (ref 32.3–36.5)
MCV RBC AUTO: 93.7 FL (ref 81.4–97.8)
MONOCYTES # BLD AUTO: 0.4 K/UL (ref 0–0.85)
MONOCYTES NFR BLD AUTO: 5.9 % (ref 0–13.4)
NEUTROPHILS # BLD AUTO: 5 K/UL (ref 1.82–7.42)
NEUTROPHILS NFR BLD: 73.4 % (ref 44–72)
NRBC # BLD AUTO: 0 K/UL
NRBC BLD-RTO: 0 /100 WBC (ref 0–0.2)
PLATELET # BLD AUTO: 283 K/UL (ref 164–446)
PMV BLD AUTO: 9.9 FL (ref 9–12.9)
POTASSIUM SERPL-SCNC: 4 MMOL/L (ref 3.6–5.5)
PROT SERPL-MCNC: 6.8 G/DL (ref 6–8.2)
PROTHROMBIN TIME: 16.1 SEC (ref 12–14.6)
RBC # BLD AUTO: 4.79 M/UL (ref 4.7–6.1)
SODIUM SERPL-SCNC: 136 MMOL/L (ref 135–145)
TSH SERPL DL<=0.005 MIU/L-ACNC: 0.82 UIU/ML (ref 0.38–5.33)
WBC # BLD AUTO: 6.8 K/UL (ref 4.8–10.8)

## 2023-06-13 PROCEDURE — 80053 COMPREHEN METABOLIC PANEL: CPT

## 2023-06-13 PROCEDURE — 85025 COMPLETE CBC W/AUTO DIFF WBC: CPT

## 2023-06-13 PROCEDURE — 84443 ASSAY THYROID STIM HORMONE: CPT

## 2023-06-13 PROCEDURE — 85610 PROTHROMBIN TIME: CPT

## 2023-06-13 PROCEDURE — 36415 COLL VENOUS BLD VENIPUNCTURE: CPT

## 2023-06-16 ENCOUNTER — APPOINTMENT (OUTPATIENT)
Dept: CARDIOLOGY | Facility: MEDICAL CENTER | Age: 68
End: 2023-06-16
Attending: INTERNAL MEDICINE
Payer: MEDICARE

## 2023-06-16 ENCOUNTER — ANESTHESIA (OUTPATIENT)
Dept: CARDIOLOGY | Facility: MEDICAL CENTER | Age: 68
End: 2023-06-16
Payer: MEDICARE

## 2023-06-16 ENCOUNTER — HOSPITAL ENCOUNTER (OUTPATIENT)
Facility: MEDICAL CENTER | Age: 68
End: 2023-06-16
Attending: INTERNAL MEDICINE | Admitting: INTERNAL MEDICINE
Payer: MEDICARE

## 2023-06-16 ENCOUNTER — ANESTHESIA EVENT (OUTPATIENT)
Dept: CARDIOLOGY | Facility: MEDICAL CENTER | Age: 68
End: 2023-06-16
Payer: MEDICARE

## 2023-06-16 VITALS
HEART RATE: 84 BPM | HEIGHT: 71 IN | TEMPERATURE: 96.7 F | BODY MASS INDEX: 32.25 KG/M2 | OXYGEN SATURATION: 96 % | DIASTOLIC BLOOD PRESSURE: 70 MMHG | SYSTOLIC BLOOD PRESSURE: 121 MMHG | WEIGHT: 230.38 LBS | RESPIRATION RATE: 16 BRPM

## 2023-06-16 DIAGNOSIS — I48.0 PAROXYSMAL ATRIAL FIBRILLATION (HCC): ICD-10-CM

## 2023-06-16 LAB
ACT BLD: 275 SEC (ref 74–137)
ACT BLD: 305 SEC (ref 74–137)
ACT BLD: 305 SEC (ref 74–137)
ACT BLD: 317 SEC (ref 74–137)
ACT BLD: 359 SEC (ref 74–137)
ACT BLD: 414 SEC (ref 74–137)
EKG IMPRESSION: NORMAL
EKG IMPRESSION: NORMAL

## 2023-06-16 PROCEDURE — 160036 HCHG PACU - EA ADDL 30 MINS PHASE I

## 2023-06-16 PROCEDURE — 160035 HCHG PACU - 1ST 60 MINS PHASE I

## 2023-06-16 PROCEDURE — 93656 COMPRE EP EVAL ABLTJ ATR FIB: CPT | Performed by: INTERNAL MEDICINE

## 2023-06-16 PROCEDURE — A9270 NON-COVERED ITEM OR SERVICE: HCPCS | Performed by: INTERNAL MEDICINE

## 2023-06-16 PROCEDURE — 93325 DOPPLER ECHO COLOR FLOW MAPG: CPT | Mod: 26 | Performed by: ANESTHESIOLOGY

## 2023-06-16 PROCEDURE — A9270 NON-COVERED ITEM OR SERVICE: HCPCS | Performed by: ANESTHESIOLOGY

## 2023-06-16 PROCEDURE — C1730 CATH, EP, 19 OR FEW ELECT: HCPCS

## 2023-06-16 PROCEDURE — 160047 HCHG PACU  - EA ADDL 30 MINS PHASE II

## 2023-06-16 PROCEDURE — 700102 HCHG RX REV CODE 250 W/ 637 OVERRIDE(OP): Performed by: ANESTHESIOLOGY

## 2023-06-16 PROCEDURE — 93005 ELECTROCARDIOGRAM TRACING: CPT | Performed by: INTERNAL MEDICINE

## 2023-06-16 PROCEDURE — 700111 HCHG RX REV CODE 636 W/ 250 OVERRIDE (IP): Performed by: ANESTHESIOLOGY

## 2023-06-16 PROCEDURE — 93312 ECHO TRANSESOPHAGEAL: CPT | Mod: 26,59 | Performed by: ANESTHESIOLOGY

## 2023-06-16 PROCEDURE — 160002 HCHG RECOVERY MINUTES (STAT)

## 2023-06-16 PROCEDURE — 160046 HCHG PACU - 1ST 60 MINS PHASE II

## 2023-06-16 PROCEDURE — 700101 HCHG RX REV CODE 250

## 2023-06-16 PROCEDURE — 93320 DOPPLER ECHO COMPLETE: CPT | Mod: 26 | Performed by: ANESTHESIOLOGY

## 2023-06-16 PROCEDURE — 00537 ANES CARDIAC EP PROCEDURES: CPT | Performed by: ANESTHESIOLOGY

## 2023-06-16 PROCEDURE — 700105 HCHG RX REV CODE 258: Performed by: INTERNAL MEDICINE

## 2023-06-16 PROCEDURE — 700111 HCHG RX REV CODE 636 W/ 250 OVERRIDE (IP)

## 2023-06-16 PROCEDURE — 700101 HCHG RX REV CODE 250: Performed by: ANESTHESIOLOGY

## 2023-06-16 PROCEDURE — 85347 COAGULATION TIME ACTIVATED: CPT | Mod: 91

## 2023-06-16 PROCEDURE — 99100 ANES PT EXTEME AGE<1 YR&>70: CPT | Performed by: ANESTHESIOLOGY

## 2023-06-16 PROCEDURE — 93010 ELECTROCARDIOGRAM REPORT: CPT | Mod: 76,59 | Performed by: INTERNAL MEDICINE

## 2023-06-16 PROCEDURE — 700102 HCHG RX REV CODE 250 W/ 637 OVERRIDE(OP): Performed by: INTERNAL MEDICINE

## 2023-06-16 PROCEDURE — 36620 INSERTION CATHETER ARTERY: CPT | Performed by: ANESTHESIOLOGY

## 2023-06-16 PROCEDURE — 93325 DOPPLER ECHO COLOR FLOW MAPG: CPT

## 2023-06-16 RX ORDER — ETOMIDATE 2 MG/ML
INJECTION INTRAVENOUS PRN
Status: DISCONTINUED | OUTPATIENT
Start: 2023-06-16 | End: 2023-06-16 | Stop reason: SURG

## 2023-06-16 RX ORDER — OXYCODONE HCL 5 MG/5 ML
5 SOLUTION, ORAL ORAL
Status: COMPLETED | OUTPATIENT
Start: 2023-06-16 | End: 2023-06-16

## 2023-06-16 RX ORDER — SODIUM CHLORIDE, SODIUM LACTATE, POTASSIUM CHLORIDE, CALCIUM CHLORIDE 600; 310; 30; 20 MG/100ML; MG/100ML; MG/100ML; MG/100ML
INJECTION, SOLUTION INTRAVENOUS CONTINUOUS
Status: DISCONTINUED | OUTPATIENT
Start: 2023-06-16 | End: 2023-06-16 | Stop reason: HOSPADM

## 2023-06-16 RX ORDER — HYDROMORPHONE HYDROCHLORIDE 1 MG/ML
0.4 INJECTION, SOLUTION INTRAMUSCULAR; INTRAVENOUS; SUBCUTANEOUS
Status: DISCONTINUED | OUTPATIENT
Start: 2023-06-16 | End: 2023-06-16 | Stop reason: HOSPADM

## 2023-06-16 RX ORDER — LABETALOL HYDROCHLORIDE 5 MG/ML
INJECTION, SOLUTION INTRAVENOUS PRN
Status: DISCONTINUED | OUTPATIENT
Start: 2023-06-16 | End: 2023-06-16 | Stop reason: SURG

## 2023-06-16 RX ORDER — MEPERIDINE HYDROCHLORIDE 25 MG/ML
12.5 INJECTION INTRAMUSCULAR; INTRAVENOUS; SUBCUTANEOUS
Status: DISCONTINUED | OUTPATIENT
Start: 2023-06-16 | End: 2023-06-16 | Stop reason: HOSPADM

## 2023-06-16 RX ORDER — ONDANSETRON 2 MG/ML
INJECTION INTRAMUSCULAR; INTRAVENOUS PRN
Status: DISCONTINUED | OUTPATIENT
Start: 2023-06-16 | End: 2023-06-16 | Stop reason: SURG

## 2023-06-16 RX ORDER — HEPARIN SODIUM 1000 [USP'U]/ML
INJECTION, SOLUTION INTRAVENOUS; SUBCUTANEOUS
Status: COMPLETED
Start: 2023-06-16 | End: 2023-06-16

## 2023-06-16 RX ORDER — SODIUM CHLORIDE, SODIUM LACTATE, POTASSIUM CHLORIDE, CALCIUM CHLORIDE 600; 310; 30; 20 MG/100ML; MG/100ML; MG/100ML; MG/100ML
INJECTION, SOLUTION INTRAVENOUS CONTINUOUS
Status: ACTIVE | OUTPATIENT
Start: 2023-06-16 | End: 2023-06-16

## 2023-06-16 RX ORDER — LABETALOL HYDROCHLORIDE 5 MG/ML
5 INJECTION, SOLUTION INTRAVENOUS
Status: DISCONTINUED | OUTPATIENT
Start: 2023-06-16 | End: 2023-06-16 | Stop reason: HOSPADM

## 2023-06-16 RX ORDER — ACETAMINOPHEN 325 MG/1
650 TABLET ORAL EVERY 4 HOURS PRN
Status: DISCONTINUED | OUTPATIENT
Start: 2023-06-16 | End: 2023-06-16 | Stop reason: HOSPADM

## 2023-06-16 RX ORDER — LIDOCAINE HYDROCHLORIDE 20 MG/ML
INJECTION, SOLUTION INFILTRATION; PERINEURAL
Status: COMPLETED
Start: 2023-06-16 | End: 2023-06-16

## 2023-06-16 RX ORDER — OMEPRAZOLE 20 MG/1
20 CAPSULE, DELAYED RELEASE ORAL
Status: DISCONTINUED | OUTPATIENT
Start: 2023-06-16 | End: 2023-06-16 | Stop reason: HOSPADM

## 2023-06-16 RX ORDER — HYDROMORPHONE HYDROCHLORIDE 1 MG/ML
0.2 INJECTION, SOLUTION INTRAMUSCULAR; INTRAVENOUS; SUBCUTANEOUS
Status: DISCONTINUED | OUTPATIENT
Start: 2023-06-16 | End: 2023-06-16 | Stop reason: HOSPADM

## 2023-06-16 RX ORDER — EPHEDRINE SULFATE 50 MG/ML
5 INJECTION, SOLUTION INTRAVENOUS
Status: DISCONTINUED | OUTPATIENT
Start: 2023-06-16 | End: 2023-06-16 | Stop reason: HOSPADM

## 2023-06-16 RX ORDER — OXYCODONE HCL 5 MG/5 ML
10 SOLUTION, ORAL ORAL
Status: COMPLETED | OUTPATIENT
Start: 2023-06-16 | End: 2023-06-16

## 2023-06-16 RX ORDER — HALOPERIDOL 5 MG/ML
1 INJECTION INTRAMUSCULAR
Status: DISCONTINUED | OUTPATIENT
Start: 2023-06-16 | End: 2023-06-16 | Stop reason: HOSPADM

## 2023-06-16 RX ORDER — NEOSTIGMINE METHYLSULFATE 1 MG/ML
INJECTION, SOLUTION INTRAVENOUS PRN
Status: DISCONTINUED | OUTPATIENT
Start: 2023-06-16 | End: 2023-06-16 | Stop reason: SURG

## 2023-06-16 RX ORDER — METOPROLOL TARTRATE 1 MG/ML
1 INJECTION, SOLUTION INTRAVENOUS
Status: DISCONTINUED | OUTPATIENT
Start: 2023-06-16 | End: 2023-06-16 | Stop reason: HOSPADM

## 2023-06-16 RX ORDER — PROTAMINE SULFATE 10 MG/ML
INJECTION, SOLUTION INTRAVENOUS PRN
Status: DISCONTINUED | OUTPATIENT
Start: 2023-06-16 | End: 2023-06-16 | Stop reason: SURG

## 2023-06-16 RX ORDER — DIPHENHYDRAMINE HYDROCHLORIDE 50 MG/ML
12.5 INJECTION INTRAMUSCULAR; INTRAVENOUS
Status: DISCONTINUED | OUTPATIENT
Start: 2023-06-16 | End: 2023-06-16 | Stop reason: HOSPADM

## 2023-06-16 RX ORDER — ACETAMINOPHEN 500 MG
1000 TABLET ORAL EVERY 4 HOURS PRN
Status: DISCONTINUED | OUTPATIENT
Start: 2023-06-16 | End: 2023-06-16 | Stop reason: HOSPADM

## 2023-06-16 RX ORDER — ONDANSETRON 2 MG/ML
4 INJECTION INTRAMUSCULAR; INTRAVENOUS
Status: DISCONTINUED | OUTPATIENT
Start: 2023-06-16 | End: 2023-06-16 | Stop reason: HOSPADM

## 2023-06-16 RX ORDER — HEPARIN SODIUM 200 [USP'U]/100ML
INJECTION, SOLUTION INTRAVENOUS
Status: COMPLETED
Start: 2023-06-16 | End: 2023-06-16

## 2023-06-16 RX ORDER — HYDROMORPHONE HYDROCHLORIDE 1 MG/ML
0.1 INJECTION, SOLUTION INTRAMUSCULAR; INTRAVENOUS; SUBCUTANEOUS
Status: DISCONTINUED | OUTPATIENT
Start: 2023-06-16 | End: 2023-06-16 | Stop reason: HOSPADM

## 2023-06-16 RX ORDER — PROTAMINE SULFATE 10 MG/ML
INJECTION, SOLUTION INTRAVENOUS
Status: COMPLETED
Start: 2023-06-16 | End: 2023-06-16

## 2023-06-16 RX ADMIN — HEPARIN SODIUM: 1000 INJECTION, SOLUTION INTRAVENOUS; SUBCUTANEOUS at 14:35

## 2023-06-16 RX ADMIN — PROTAMINE SULFATE 50 MG: 10 INJECTION, SOLUTION INTRAVENOUS at 14:35

## 2023-06-16 RX ADMIN — LIDOCAINE HYDROCHLORIDE: 20 INJECTION, SOLUTION INFILTRATION; PERINEURAL at 11:22

## 2023-06-16 RX ADMIN — ROCURONIUM BROMIDE 10 MG: 10 INJECTION, SOLUTION INTRAVENOUS at 13:47

## 2023-06-16 RX ADMIN — FENTANYL CITRATE 300 MCG: 50 INJECTION, SOLUTION INTRAMUSCULAR; INTRAVENOUS at 11:23

## 2023-06-16 RX ADMIN — ETOMIDATE 20 MG: 2 INJECTION, SOLUTION INTRAVENOUS at 11:23

## 2023-06-16 RX ADMIN — OMEPRAZOLE 20 MG: 20 CAPSULE, DELAYED RELEASE ORAL at 16:31

## 2023-06-16 RX ADMIN — ONDANSETRON 4 MG: 2 INJECTION INTRAMUSCULAR; INTRAVENOUS at 11:58

## 2023-06-16 RX ADMIN — HEPARIN SODIUM: 1000 INJECTION, SOLUTION INTRAVENOUS; SUBCUTANEOUS at 11:22

## 2023-06-16 RX ADMIN — SODIUM CHLORIDE, POTASSIUM CHLORIDE, SODIUM LACTATE AND CALCIUM CHLORIDE: 600; 310; 30; 20 INJECTION, SOLUTION INTRAVENOUS at 11:13

## 2023-06-16 RX ADMIN — ROCURONIUM BROMIDE 50 MG: 10 INJECTION, SOLUTION INTRAVENOUS at 11:23

## 2023-06-16 RX ADMIN — ROCURONIUM BROMIDE 10 MG: 10 INJECTION, SOLUTION INTRAVENOUS at 12:20

## 2023-06-16 RX ADMIN — NEOSTIGMINE METHYLSULFATE 5 MG: 1 INJECTION INTRAVENOUS at 14:44

## 2023-06-16 RX ADMIN — HEPARIN SODIUM 3000 ML: 200 INJECTION, SOLUTION INTRAVENOUS at 11:15

## 2023-06-16 RX ADMIN — ROCURONIUM BROMIDE 10 MG: 10 INJECTION, SOLUTION INTRAVENOUS at 12:49

## 2023-06-16 RX ADMIN — ROCURONIUM BROMIDE 10 MG: 10 INJECTION, SOLUTION INTRAVENOUS at 13:33

## 2023-06-16 RX ADMIN — HEPARIN SODIUM 1000 UNITS: 1000 INJECTION, SOLUTION INTRAVENOUS; SUBCUTANEOUS at 12:20

## 2023-06-16 RX ADMIN — OXYCODONE HYDROCHLORIDE 5 MG: 5 SOLUTION ORAL at 16:30

## 2023-06-16 RX ADMIN — LABETALOL HYDROCHLORIDE 10 MG: 5 INJECTION, SOLUTION INTRAVENOUS at 12:22

## 2023-06-16 RX ADMIN — LABETALOL HYDROCHLORIDE 10 MG: 5 INJECTION, SOLUTION INTRAVENOUS at 11:59

## 2023-06-16 RX ADMIN — ROCURONIUM BROMIDE 10 MG: 10 INJECTION, SOLUTION INTRAVENOUS at 13:07

## 2023-06-16 ASSESSMENT — PAIN DESCRIPTION - PAIN TYPE
TYPE: ACUTE PAIN
TYPE: SURGICAL PAIN;ACUTE PAIN
TYPE: ACUTE PAIN

## 2023-06-16 ASSESSMENT — FIBROSIS 4 INDEX: FIB4 SCORE: 1.11

## 2023-06-16 ASSESSMENT — PAIN SCALES - GENERAL: PAIN_LEVEL: 0

## 2023-06-16 NOTE — ANESTHESIA TIME REPORT
Anesthesia Start and Stop Event Times     Date Time Event    6/16/2023 1113 Anesthesia Start     1118 Ready for Procedure     1506 Anesthesia Stop        Responsible Staff  06/16/23    Name Role Begin End    Lm Blackmon M.D. Anesth 1113 1506        Overtime Reason:  no overtime (within assigned shift)    Comments:

## 2023-06-16 NOTE — ANESTHESIA PREPROCEDURE EVALUATION
Date/Time: 06/16/23 1130    Scheduled providers: Naga Davalos M.D.; Lm Blackmon M.D.    Procedure: CL-EP ABLATION ATRIAL FIBRILLATION    Diagnosis: Paroxysmal atrial fibrillation (HCC) [I48.0]    Indications: See Associated Dx    Location: [facility] IMAG[facility] - CATH LAB - [facility]          Relevant Problems   ANESTHESIA   (positive) Obstructive sleep apnea syndrome      CARDIAC   (positive) Essential hypertension   (positive) Paroxysmal atrial fibrillation (HCC)       Physical Exam    Airway   Mallampati: II  TM distance: >3 FB  Neck ROM: full       Cardiovascular - normal exam  Rhythm: regular  Rate: normal  (-) murmur     Dental - normal exam           Pulmonary - normal exam  Breath sounds clear to auscultation     Abdominal    Neurological - normal exam                 Anesthesia Plan    ASA 3   ASA physical status 3 criteria: other (comment)    Plan - general       Airway plan will be ETT    (A fiv; essie juan)      Induction: intravenous    Postoperative Plan: Postoperative administration of opioids is intended.    Pertinent diagnostic labs and testing reviewed    Informed Consent:    Anesthetic plan and risks discussed with patient.    Use of blood products discussed with: patient whom consented to blood products.

## 2023-06-16 NOTE — DISCHARGE INSTRUCTIONS
HOME CARE INSTRUCTIONS    ACTIVITY: Rest and take it easy for the first 24 hours.  A responsible adult is recommended to remain with you during that time.  It is normal to feel sleepy.  We encourage you to not do anything that requires balance, judgment or coordination.    FOR 24 HOURS DO NOT:  Drive, operate machinery or run household appliances.  Drink beer or alcoholic beverages.  Make important decisions or sign legal documents.    SPECIAL INSTRUCTIONS:   [facility] Post Ablation Patient Instructions:  No lifting > 10 lbs x 1 week.      No soaking in baths, hot tubs, pools x 1 week.  May shower the day after discharge and take off groin dressings and leave  sites uncovered.  Continue to monitor sites daily for warmth, redness, discolored drainage.  It is common to have a small lump in the area where the cather was (usually the size of a marble); this will go away but takes approximately 6 weeks to normalize.     3.   Please take all medications as prescribed to you; please do not stop any medications prescribed post ablation unless directed by your healthcare provider.      4.   Please do not miss any doses of your blood thinner (if you have been started on, or take chronic blood thinners) without discussion with your healthcare provider first.     5.   Please walk and take deep breaths after discharge.  After discharge, if you experience neurological changes/signs of stroke or high fever you should be seen in the emergency dept.     6.   It is possible you may experience some chest discomfort or chest tightness post ablation.  This is usually secondary to inflammation and irritation of the tissues at the area of the ablation.  If this occurs, it is advised to try 400 mg of Ibuprofen with food as needed up to three times a day for a maximum of two days.  This should help to decrease pain and tissue inflammation.          **Please notify the office ([phone]) if this  occurs.         ** DO NOT TAKE Ibuprofen IF HISTORY OF ALLERGY, SIGNIFICANT BLEEDING OR KIDNEY DISEASE WITHOUT DISCUSSING WITH YOUR CARDIOLOGY PROVIDER FIRST.          ** If pain becomes severe or you have additional symptoms you may need to be medically evaluated; please contact the cardiology office ([phone]) for further direction.     7. It is possible that you may experience arrhythmia/Atrial Fibrillation post ablation.  This is secondary to irritation and inflammation of the cardiac tissues from the ablation.  If you have atrial fibrillation all day or feel poorly with it, please notify your cardiologist's via phone ([phone]) or [employer].      8.  Please contact call our office ([phone]) or message via [employer] message if you have any questions or concerns post procedurally.    9. You need to be seen for post ablation follow up 3-4 weeks post procedure. An appointment is scheduled for you.  Please contact the office ([phone]) if you need to change your appointment.      DIET: To avoid nausea, slowly advance diet as tolerated, avoiding spicy or greasy foods for the first day.  Add more substantial food to your diet according to your physician's instructions.  Babies can be fed formula or breast milk as soon as they are hungry.  INCREASE FLUIDS AND FIBER TO AVOID CONSTIPATION.    SURGICAL DRESSING/BATHING: See above.    MEDICATIONS: Resume taking daily medication.  Take prescribed pain medication with food.  If no medication is prescribed, you may take non-aspirin pain medication if needed.  PAIN MEDICATION CAN BE VERY CONSTIPATING.  Take a stool softener or laxative such as senokot, pericolace, or milk of magnesia if needed.      A follow-up appointment should be arranged with your doctor in 3-4 weeks; call to schedule.    You should CALL YOUR PHYSICIAN if you develop:  Fever greater than 101 degrees F.  Pain not relieved by medication, or persistent nausea or vomiting.  Excessive bleeding  (blood soaking through dressing) or unexpected drainage from the wound.  Extreme redness or swelling around the incision site, drainage of pus or foul smelling drainage.  Inability to urinate or empty your bladder within 8 hours.  Problems with breathing or chest pain.    You should call 911 if you develop problems with breathing or chest pain.  If you are unable to contact your doctor or surgical center, you should go to the nearest emergency room or urgent care center.  Physician's telephone #: Dr. Davalos [phone]    MILD FLU-LIKE SYMPTOMS ARE NORMAL.  YOU MAY EXPERIENCE GENERALIZED MUSCLE ACHES, THROAT IRRITATION, HEADACHE AND/OR SOME NAUSEA.    If any questions arise, call your doctor.  If your doctor is not available, please feel free to call the Surgical Center at [phone].  The Center is open Monday through Friday from 7AM to 7PM.      A registered nurse may call you a few days after your surgery to see how you are doing after your procedure.    You may also receive a survey in the mail within the next two weeks and we ask that you take a few moments to complete the survey and return it to us.  Our goal is to provide you with very good care and we value your comments.     Depression / Suicide Risk    As you are discharged from this [facility], it is important to learn how to keep safe from harming yourself.    Recognize the warning signs:  Abrupt changes in personality, positive or negative- including increase in energy   Giving away possessions  Change in eating patterns- significant weight changes-  positive or negative  Change in sleeping patterns- unable to sleep or sleeping all the time   Unwillingness or inability to communicate  Depression  Unusual sadness, discouragement and loneliness  Talk of wanting to die  Neglect of personal appearance   Rebelliousness- reckless behavior  Withdrawal from people/activities they love  Confusion- inability to concentrate     If you or a loved  one observes any of these behaviors or has concerns about self-harm, here's what you can do:  Talk about it- your feelings and reasons for harming yourself  Remove any means that you might use to hurt yourself (examples: pills, rope, extension cords, firearm)  Get professional help from the community (Mental Health, Substance Abuse, psychological counseling)  Do not be alone:Call your Safe Contact- someone whom you trust who will be there for you.  Call your local CRISIS HOTLINE 662-7612 or 190-023-4203  Call your local Children's Mobile Crisis Response Team [place] (445) 912-8361 or www.[employer]  Call the toll free National Suicide Prevention Hotlines   National Suicide Prevention Lifeline 863-025-TPXO (7383)  National Hope Line Network 800-SUICIDE (205-7293)    I acknowledge receipt and understanding of these Home Care instructions.

## 2023-06-16 NOTE — ANESTHESIA PROCEDURE NOTES
Airway    Date/Time: 6/16/2023 11:28 AM    Performed by: Lm Blackmon M.D.  Authorized by: Lm Blackmon M.D.    Location:  OR  Urgency:  Elective  Indications for Airway Management:  Anesthesia      Spontaneous Ventilation: absent    Sedation Level:  Deep  Preoxygenated: Yes    Patient Position:  Sniffing  Mask Difficulty Assessment:  2 - vent by mask + OA or adjuvant +/- NMBA  Final Airway Type:  Endotracheal airway  Final Endotracheal Airway:  ETT  Cuffed: Yes    Technique Used for Successful ETT Placement:  Direct laryngoscopy    Insertion Site:  Oral  Blade Type:  Tanner  Laryngoscope Blade/Videolaryngoscope Blade Size:  3  ETT Size (mm):  7.0  Measured from:  Teeth  ETT to Teeth (cm):  25  Placement Verified by: auscultation and capnometry    Cormack-Lehane Classification:  Grade IIb - view of arytenoids or posterior of glottis only  Number of Attempts at Approach:  1

## 2023-06-16 NOTE — H&P
Physician H&P    Patient ID:  Dean Nobles  [id number]  68 y.o. male  1955    History:  Primary Diagnosis: Paroxysmal atrial fibrillation despite antiarrhythmics for catheter ablation    HPI:  80% on Zio patch.  On flecainide.  On anticoagulation.  Sleep apnea.    Past Medical History:  has a past medical history of ADD (attention deficit disorder) (2012), Arrhythmia, Atrial fibrillation (HCC), BPH (benign prostatic hyperplasia) (2012), Chickenpox, Depression, Dyslipidemia (2012), ED (erectile dysfunction) (2012), Frequent urination, Uzbek measles, Heartburn, HTN (hypertension), benign (2012), Hypertension, Influenza, Mumps, Rheumatic fever, Shortness of breath, Sleep apnea (), Swelling of lower extremity, Vision loss, and Wears glasses.  Past Surgical History:  has a past surgical history that includes laminotomy and eye surgery.  Past Social History:  reports that he quit smoking about 39 years ago. His smoking use included cigarettes. He has a 30.00 pack-year smoking history. He has never used smokeless tobacco. He reports current alcohol use of about 21.6 oz of alcohol per week. He reports that he does not use drugs.  Past Family History:   Family History   Problem Relation Age of Onset    Hyperlipidemia Mother     Heart Disease Mother          of heart attack at age of 80    Diabetes Mother         Type 2    Cancer Father         skin    Other Father         liver cirrhosis    Diabetes Sister     Hypertension Sister     Kidney Disease Sister     Diabetes Sister     Hypertension Sister     Other Sister         Kidney failure    Hyperlipidemia Brother     Heart Disease Brother         arrhysmia    Atrial fibrillation Brother     Sleep Apnea Brother     Ovarian Cancer Neg Hx     Tubal Cancer Neg Hx     Peritoneal Cancer Neg Hx     Colorectal Cancer Neg Hx     Breast Cancer Neg Hx     Stroke Neg Hx      Allergies: Patient has no known allergies.    Current  "Medications:  Prior to Admission medications    Medication Sig Start Date End Date Taking? Authorizing Provider   ELIQUIS 5 MG Tab Take 5 mg by mouth 2 times a day. 5/17/23   Physician Outpatient   atorvastatin (LIPITOR) 10 MG Tab Take 1 Tablet by mouth every day. 5/24/23   Yvrose Sams M.D.   flecainide (TAMBOCOR) 150 MG Tab Take 0.5 Tablets by mouth 2 times a day. 4/24/23   Naga Davalos M.D.   benazepril (LOTENSIN) 20 MG Tab Take 1 Tablet by mouth every day. 3/22/23   Oliverio Devlin M.D.   spironolactone (ALDACTONE) 25 MG Tab Take 1 Tablet by mouth every day. 3/22/23   Oliverio Devlin M.D.   digoxin (LANOXIN) 125 MCG Tab Take 1 Tablet by mouth every evening. Please take 250mcg x 3 days, then 125mcg daily. 3/22/23   Oliverio Devlin M.D.   escitalopram (LEXAPRO) 10 MG Tab Take 1 Tablet by mouth every day. 10/26/22   Leora Álvarez M.D.   escitalopram (LEXAPRO) 20 MG tablet Take 1 Tablet by mouth every day. 10/26/22   Leora Álvarez M.D.   latanoprost (XALATAN) 0.005 % Solution Administer 1 Drop into both eyes every day. 11/6/21   Physician Outpatient   timolol (TIMOPTIC) 0.5 % Solution Administer 1 Drop into both eyes every day. 12/1/21   Physician Outpatient   tadalafil (CIALIS) 10 MG tablet Take 1 tablet by mouth as needed for Erectile Dysfunction. 7/23/21   Leora Álvarez M.D.   omeprazole (PRILOSEC) 20 MG delayed-release capsule Take 1 Cap by mouth every day. 2/26/20   Jose Sanchez M.D.       Review of Systems:  ROS  BP (!) 169/99   Pulse 86   Temp 36.3 °C (97.4 °F) (Temporal)   Resp 16   Ht 1.803 m (5' 11\")   Wt 104 kg (230 lb 6.1 oz)   SpO2 96%     Physical Examination:  Physical Exam  Vitals reviewed.   Constitutional:       General: He is not in acute distress.     Appearance: He is well-developed. He is not diaphoretic.   Eyes:      Conjunctiva/sclera: Conjunctivae normal.   Neck:      Thyroid: No thyroid mass or thyromegaly.      Vascular: No JVD.      " Trachea: No tracheal deviation.   Cardiovascular:      Rate and Rhythm: Normal rate. Rhythm irregular.      Heart sounds: No murmur heard.  Pulmonary:      Effort: Pulmonary effort is normal. No respiratory distress.      Breath sounds: Normal breath sounds.   Chest:      Chest wall: No tenderness.   Abdominal:      Palpations: Abdomen is soft.      Tenderness: There is no abdominal tenderness.   Musculoskeletal:         General: Normal range of motion.   Skin:     General: Skin is warm and dry.   Neurological:      Mental Status: He is alert and oriented to person, place, and time.      Motor: No tremor.   Psychiatric:         Behavior: Behavior normal.         Impression:  1.  Recurrent atrial fibrillation paroxysmal for catheter ablation.  The risks, benefits,and alternatives to atrial fibrillation ablation with general anesthesia were discussed in great detail. Specific risks mentioned including bleeding, infection, arteriovenous fistula/pseudoaneurysm related to sheath placement, cardiac perforation with possible tamponade requiring pericardiocentesis or possible open heart surgery were discussed. In addition,we discussed atrial fibrillation ablation specific complications including pulmonary vein stenosis, left atrial perforation (2-4%), and nerve damage involving the recurrent laryngeal nerve, the vagus nerve with resulting gastroparesis, and the phrenic nerve.  Also mentioned was a 1/400 (0.25%) occurrence of atrial esophageal fistula, which is an abnormal communication between the esophagus and the left atrium; this complication is often fatal. Lastly the risks of death, myocardial infarction, stroke, DVT and PE were discussed. The patient verbalized understanding of these potential complications and wishes to proceed with this procedure.  The risks, benefits, and alternatives to transesophageal echocardiogram with IV sedation were discussed with the patient in specific detail, including oropharyngeal and  esophageal traumas including hoarseness and dysphagia after the procedure. Rare cases demonstrating serious or fatal complications associated with transesophageal echocardiogram have been reported in the adult population, including cardiac, pulmonary and bleeding complications in less than 1% of people. Patients with an identified intracardiac thrombus are at increased risk for embolic events and this appears to be reduced with anticoagulant therapy. The patient verbalized understandings about these  possible complications and wishes to proceed with this procedure

## 2023-06-16 NOTE — CATH LAB
Electrophysiology Procedure Note  [facility]    Indication: Paroxysmal atrial fibrillation    Procedure Performed: 1. Atrial fibrillation ablation 2. Advanced mapping using CARTO system  3. Transesophageal echocardiography 4. Intracardiac echocardiography 5. Esophageal temperature monitoring 6. Intraarterial blood pressure monitoring 7. Frequent ACT's    Physician(s): HAMLET Davalos M.D.     Resident/Assistant(s): None     Anesthesia: General endotracheal anesthetic.    Anaesthesiologist: Dr. Lm Blackmon    Specimen(s) Removed: None     Estimated Blood Loss:  30cc     Complications:  None    Pre-procedure ECG: Atrial fibrillation    Post Procedure ECG: Sinus rhythm    Description of Procedure:   After informed written consent, the patient was brought to the EP lab in the fasting, non-sedated state. The patient was prepped and draped in the usual sterile fashion.  EFE showed no evidence of LA clot. Arterial line placed for monitoring. Esophageal temperature probe placed and monitored.  Femoral venous access was obtained using the modified Seldinger technique. In the right femoral vein, 2 sheaths (8,8 Fr) were inserted over 0.35” guidewires. In the left femoral vein, 2 sheaths (6,8  Fr) were inserted over 0.35” guidewires. A deflectable decapolar catheter was advanced to the CS position. An intracardiac echo (ICE) catheter was advanced to the right atrium. ICE was used to identify the atrial septum, left atrial appendage, and pulmonary veins and flavia the anatomic structures to superimpose on our 3D electroanatomic map using CARTOSound. During the procedure, ICE was utilized to localize the ablation catheter, monitor for thrombus formation, and to exclude pericardial effusion. Intravenous heparin was administered to maintain -350 seconds. Double transseptal left heart catheterization was performed under intracardiac echo and hemodynamic guidance. A Clifford needle was inserted into the two 8 Fr  sheaths (TorFlex) for transseptal puncture and placement of sheaths in the left atrium. One TorFlex was switched to a  LifeBioigo steerable sheath. Advanced mapping of the pulmonary veins and left atrium was performed using CARTO3 FAM ST/SF D/F and a deflectable multipolar mapping catheter ([employer] OctaRay) with the CARTO system. Wide antral circumferential ablation was performed using an 3.5 mm deflectable irrigated force contact catheter (Biosense ST/SF D/F) at primarily 25 W (posteriorly) to 35 W ( anteriorly)  power starting from the L sided veins and then proceeding along to the RSPV and then finally the RIPV.  DC cardioversion performed.  Bidirectional pulmonary vein antrum isolation was verified at the end of the procedure by pacing inside the vein and confirming exit block along with absence of local PV signals on the OctaRay. PES failed to induce arrhythmias. At the end of the procedure, heparin was reversed with protamine, the catheter and sheaths were removed, and hemostasis was achieved by vascades. Following recovery from anesthesia, the patient was transferred a monitored bed.    Total ablation time: 2356 seconds    Fluoroscopy time: 7.8 minutes, 882 mGy     Electrophysiologic Findings:    1.  Baseline: Atrial fibrillation     2.  Pulmonary vein isolation.  3.  Post ablation noninducible    Impressions:    1. Paroxysmal atrial fibrillation.    2. Successful RF ablation pulmonary vein isolation procedure.       Recommendations:  1. Resume anticoagulation.  2. Monitored bedrest.

## 2023-06-16 NOTE — ANESTHESIA POSTPROCEDURE EVALUATION
Patient: Dean Nobles    Procedure Summary     Date: 06/16/23 Room / Location: [facility] CATH [facility]    Anesthesia Start: 1113 Anesthesia Stop: 1506    Procedure: CL-EP ABLATION ATRIAL FIBRILLATION Diagnosis:       Paroxysmal atrial fibrillation (HCC)      (See Associated Dx)    Scheduled Providers: Naga Davalos M.D.; Lm Blackmon M.D. Responsible Provider: Lm Blackmon M.D.    Anesthesia Type: general ASA Status: 3          Final Anesthesia Type: general  Last vitals  BP   Blood Pressure : (!) 169/99    Temp   36.3 °C (97.4 °F)    Pulse   86   Resp   16    SpO2   96 %      Anesthesia Post Evaluation    Patient location during evaluation: PACU  Patient participation: complete - patient participated  Level of consciousness: awake and alert  Pain score: 0    Airway patency: patent  Anesthetic complications: no  Cardiovascular status: hemodynamically stable  Respiratory status: acceptable  Hydration status: euvolemic    PONV: none          No notable events documented.

## 2023-06-16 NOTE — OR NURSING
1502: Patient arrived from cath lab, handoff  completed. Patient placed on monitors with audible alarms. Educated patient about two hour bed rest, to keep legs straight, & groin and pulse checks. Bilateral groin sites soft, dressed with gauze and tegaderm. Both dressings clean, dry, and intact.     1542: Updated patient's family.    1702: Bed rest complete, patient sat up in bed, tolerating well.    1715: Patient ambulated 35 feet, steady, no assistance required, tolerated well. Bilateral groin dressings remain soft, clean, dry, and intact.

## 2023-06-16 NOTE — ANESTHESIA PROCEDURE NOTES
Arterial Line    Performed by: Lm Blackmon M.D.  Authorized by: Lm Blackmon M.D.    Start Time:  6/16/2023 11:33 AM  Localization: surface landmarks    Patient Location:  OR  Indication: continuous blood pressure monitoring        Catheter Size:  20 G  Seldinger Technique?: Yes    Laterality:  Right  Site:  Radial artery  Line Secured:  Antimicrobial disc, tape and transparent dressing  Events: patient tolerated procedure well with no complications

## 2023-06-17 NOTE — OR NURSING
1740 - Pt's vital signs are stable, pt is alert and reports no pain at this time. Dressing is clean, dry and intact - bilateral groin sites, right with minimal blood and left with minimal shadowing. Pt has no complaints of nausea or vomiting.    1812- Janeth Anand contacted in regard to pt having a bit of bleeding in his grown sites after ambulating. No hematoma present. Dermabond applied and dressings redressed, pt ambulated again and dressings reassessed - no bleeding present.      1902 - Discharge instructions and follow up appointments were discussed with pt. Pt's IV was discontinued and pt was given all belongings. Pt had no other questions. Pt pushed out via wheelchair.

## 2023-06-22 NOTE — PROGRESS NOTES
Chief Complaint   Patient presents with    Atrial Fibrillation     F/V Dx:Paroxysmal atrial fibrillation (HCC)    Dyslipidemia    Hypertension          Subjective:   Dean Nobles is a 68 y.o. male who presents today for follow-up.    Patient of Dr. Devlin.  Current medical problems include hypertension, dyslipidemia, paroxysmal atrial fibrillation S/P Successful RF ablation pulmonary vein isolation procedure by Dr. Davalos 6/16/23.    Jose experienced some sharp uncomfortable chest pain after the procedure, especially when he was laying down at night and taking a deep breath.  This took a few days and then resolved.  He did use 1 dose of Aleve. He also noted a transient 10lb weight gain, then back to his normal weight of 227lbs at home.    He is asymptomatic to his atrial fibrillation and therefore does not know if he has had any since the ablation.  He continues on the flecainide, digoxin, apixaban.    He continues to feel short of breath with activities of daily living such as working in the yard or climbing flights of stairs.  Occasionally he feels short of breath at home.  He did recently receive a diagnosis of obstructive sleep apnea and is waiting on a BiPAP machine to be sent to him.    Past Medical History:   Diagnosis Date    ADD (attention deficit disorder) 01/05/2012    Arrhythmia     Atrial fibrillation (HCC)     BPH (benign prostatic hyperplasia) 01/05/2012    Chickenpox     Depression     Dyslipidemia 01/05/2012    ED (erectile dysfunction) 01/05/2012    Frequent urination     Uzbek measles     Heartburn     HTN (hypertension), benign 01/05/2012    Hypertension     Influenza     Mumps     Rheumatic fever     Shortness of breath     Sleep apnea 2023    awaiting cpap    Swelling of lower extremity     Vision loss     Wears glasses          Past Surgical History:   Procedure Laterality Date    EYE SURGERY      12/3/21 Lazer surgery to releive pressure     LAMINOTOMY           Family History    Problem Relation Age of Onset    Hyperlipidemia Mother     Heart Disease Mother          of heart attack at age of 80    Diabetes Mother         Type 2    Cancer Father         skin    Other Father         liver cirrhosis    Diabetes Sister     Hypertension Sister     Kidney Disease Sister     Diabetes Sister     Hypertension Sister     Other Sister         Kidney failure    Hyperlipidemia Brother     Heart Disease Brother         arrhysmia    Atrial fibrillation Brother     Sleep Apnea Brother     Ovarian Cancer Neg Hx     Tubal Cancer Neg Hx     Peritoneal Cancer Neg Hx     Colorectal Cancer Neg Hx     Breast Cancer Neg Hx     Stroke Neg Hx          Social History     Tobacco Use   Smoking Status Former    Packs/day: 2.00    Years: 15.00    Pack years: 30.00    Types: Cigarettes    Quit date:     Years since quittin.5   Smokeless Tobacco Never   Tobacco Comments    16-30   Vaping Use    Vaping Use: Never used          Social History     Substance and Sexual Activity   Alcohol Use Yes    Alcohol/week: 21.6 oz    Types: 36 Glasses of wine per week    Comment: daily vodka - 4-5 cocktailes or wine - 4 glasses         No Known Allergies      Outpatient Encounter Medications as of 2023   Medication Sig Dispense Refill    spironolactone (ALDACTONE) 50 MG Tab Take 1 Tablet by mouth every day. 90 Tablet 3    benazepril (LOTENSIN) 40 MG tablet Take 1 Tablet by mouth every day. 100 Tablet 3    ELIQUIS 5 MG Tab Take 5 mg by mouth 2 times a day.      atorvastatin (LIPITOR) 10 MG Tab Take 1 Tablet by mouth every day. 90 Tablet 3    flecainide (TAMBOCOR) 150 MG Tab Take 0.5 Tablets by mouth 2 times a day. 90 Tablet 3    digoxin (LANOXIN) 125 MCG Tab Take 1 Tablet by mouth every evening. Please take 250mcg x 3 days, then 125mcg daily. 100 Tablet 3    escitalopram (LEXAPRO) 10 MG Tab Take 1 Tablet by mouth every day. 90 Tablet 3    escitalopram (LEXAPRO) 20 MG tablet Take 1 Tablet by mouth every day. 90 Tablet  "3    latanoprost (XALATAN) 0.005 % Solution Administer 1 Drop into both eyes every day.      timolol (TIMOPTIC) 0.5 % Solution Administer 1 Drop into both eyes every day.      tadalafil (CIALIS) 10 MG tablet Take 1 tablet by mouth as needed for Erectile Dysfunction. 10 tablet 3    omeprazole (PRILOSEC) 20 MG delayed-release capsule Take 1 Cap by mouth every day. 90 Cap 3    [DISCONTINUED] benazepril (LOTENSIN) 20 MG Tab Take 1 Tablet by mouth every day. 100 Tablet 3    [DISCONTINUED] spironolactone (ALDACTONE) 25 MG Tab Take 1 Tablet by mouth every day. 90 Tablet 3     No facility-administered encounter medications on file as of 6/26/2023.         Review of Systems   Constitutional:  Negative for chills, fever and malaise/fatigue.        No unexpected weight changes   Respiratory:  Positive for shortness of breath. Negative for cough.    Cardiovascular:  Negative for chest pain, palpitations, orthopnea, leg swelling and PND.   Gastrointestinal:  Negative for blood in stool, melena and nausea.   Genitourinary:  Negative for hematuria.   Neurological:  Negative for dizziness.          Objective:   BP (!) 140/80 (BP Location: Left arm, Patient Position: Sitting, BP Cuff Size: Adult)   Pulse (!) 108   Resp 16   Ht 1.803 m (5' 11\")   Wt 105 kg (231 lb)   SpO2 94%   BMI 32.22 kg/m²        Physical Exam  Vitals reviewed.   Constitutional:       General: He is not in acute distress.  HENT:      Head: Normocephalic and atraumatic.   Eyes:      General: No scleral icterus.  Neck:      Comments: Elevated JVD  Cardiovascular:      Rate and Rhythm: Tachycardia present. Rhythm irregular.      Heart sounds: No murmur heard.     Comments: Radial pulses 2+ bilaterally  PT pulses 2+ bilaterally    Pulmonary:      Effort: Pulmonary effort is normal. No respiratory distress.      Breath sounds: No rales.   Abdominal:      General: There is no distension.   Musculoskeletal:      Right lower leg: No edema.      Left lower leg: No " edema.   Skin:     General: Skin is warm and dry.   Neurological:      General: No focal deficit present.      Mental Status: He is alert.      Gait: Gait normal.   Psychiatric:         Judgment: Judgment normal.            Assessment:     1. Paroxysmal atrial fibrillation (HCC)  EKG      2. Essential hypertension  spironolactone (ALDACTONE) 50 MG Tab    Basic Metabolic Panel    benazepril (LOTENSIN) 40 MG tablet           Medical Decision Making:  Today's Assessment / Plan:      Paroxysmal atrial fibrillation S/P PVI ablation 6/16/23  - sounds like mild pericardial symptoms after the procedure, now resolved    -continue eliquis 5mg PO bid, [id number] score of 2.   -digoxin 125mcg PO daily  - cont flecainide 75mg bid  - remains in AF ?atypical AFL today, will reach out to EP, ?DCCV      Essential hypertension, uncontrolled  -Increase spironolactone to 50  -Increase benazepril to 40 mg  -blood tests in 2 weeks as these medications can cause electrolyte derangements or renal dysfunction      Dyslipidemia  Continue lipitor 10mg PO Daily for now, will consider CCS in the future to dictate how aggressively we should control cholesterol     Leg swelling, resolved after CCB discontinued

## 2023-06-26 ENCOUNTER — OFFICE VISIT (OUTPATIENT)
Dept: CARDIOLOGY | Facility: MEDICAL CENTER | Age: 68
End: 2023-06-26
Attending: PHYSICIAN ASSISTANT
Payer: MEDICARE

## 2023-06-26 VITALS
DIASTOLIC BLOOD PRESSURE: 80 MMHG | HEIGHT: 71 IN | HEART RATE: 108 BPM | WEIGHT: 231 LBS | SYSTOLIC BLOOD PRESSURE: 140 MMHG | OXYGEN SATURATION: 94 % | RESPIRATION RATE: 16 BRPM | BODY MASS INDEX: 32.34 KG/M2

## 2023-06-26 DIAGNOSIS — I10 ESSENTIAL HYPERTENSION: ICD-10-CM

## 2023-06-26 DIAGNOSIS — I48.0 PAROXYSMAL ATRIAL FIBRILLATION (HCC): ICD-10-CM

## 2023-06-26 LAB — EKG IMPRESSION: NORMAL

## 2023-06-26 PROCEDURE — 93010 ELECTROCARDIOGRAM REPORT: CPT | Performed by: STUDENT IN AN ORGANIZED HEALTH CARE EDUCATION/TRAINING PROGRAM

## 2023-06-26 PROCEDURE — 99213 OFFICE O/P EST LOW 20 MIN: CPT | Performed by: PHYSICIAN ASSISTANT

## 2023-06-26 PROCEDURE — 99212 OFFICE O/P EST SF 10 MIN: CPT | Performed by: PHYSICIAN ASSISTANT

## 2023-06-26 PROCEDURE — 93005 ELECTROCARDIOGRAM TRACING: CPT | Performed by: PHYSICIAN ASSISTANT

## 2023-06-26 PROCEDURE — 3079F DIAST BP 80-89 MM HG: CPT | Performed by: PHYSICIAN ASSISTANT

## 2023-06-26 PROCEDURE — 3077F SYST BP >= 140 MM HG: CPT | Performed by: PHYSICIAN ASSISTANT

## 2023-06-26 PROCEDURE — 99214 OFFICE O/P EST MOD 30 MIN: CPT | Performed by: PHYSICIAN ASSISTANT

## 2023-06-26 RX ORDER — SPIRONOLACTONE 50 MG/1
50 TABLET, FILM COATED ORAL DAILY
Qty: 90 TABLET | Refills: 3 | Status: SHIPPED | OUTPATIENT
Start: 2023-06-26

## 2023-06-26 RX ORDER — BENAZEPRIL HYDROCHLORIDE 40 MG/1
40 TABLET ORAL DAILY
Qty: 100 TABLET | Refills: 3 | Status: SHIPPED | OUTPATIENT
Start: 2023-06-26

## 2023-06-26 ASSESSMENT — ENCOUNTER SYMPTOMS
PND: 0
PALPITATIONS: 0
DIZZINESS: 0
ORTHOPNEA: 0
BLOOD IN STOOL: 0
SHORTNESS OF BREATH: 1
COUGH: 0
FEVER: 0
NAUSEA: 0
CHILLS: 0

## 2023-06-26 ASSESSMENT — FIBROSIS 4 INDEX: FIB4 SCORE: 1.11

## 2023-06-26 NOTE — Clinical Note
He was in AF (maybe atypical AFL??) when I saw him. He is asymptomatic to his arrhythmia, though maybe having some dyspnea. He is on flecainide. Do you guys usually cardiovert them in this case? Or should I let him cont the [facility] until he sees you in 3 weeks?  Thanks!

## 2023-06-26 NOTE — PATIENT INSTRUCTIONS
Increase benazepril to 40mg, take in the evening    Increase Spironolactone to 50mg take in the morning.     It is very important to have your blood drawn in 2 weeks

## 2023-07-11 ENCOUNTER — HOSPITAL ENCOUNTER (OUTPATIENT)
Dept: LAB | Facility: MEDICAL CENTER | Age: 68
End: 2023-07-11
Attending: PHYSICIAN ASSISTANT
Payer: MEDICARE

## 2023-07-11 DIAGNOSIS — I10 ESSENTIAL HYPERTENSION: ICD-10-CM

## 2023-07-11 LAB
ANION GAP SERPL CALC-SCNC: 10 MMOL/L (ref 7–16)
BUN SERPL-MCNC: 20 MG/DL (ref 8–22)
CALCIUM SERPL-MCNC: 9.6 MG/DL (ref 8.5–10.5)
CHLORIDE SERPL-SCNC: 98 MMOL/L (ref 96–112)
CO2 SERPL-SCNC: 25 MMOL/L (ref 20–33)
CREAT SERPL-MCNC: 0.93 MG/DL (ref 0.5–1.4)
GFR SERPLBLD CREATININE-BSD FMLA CKD-EPI: 88 ML/MIN/1.73 M 2
GLUCOSE SERPL-MCNC: 130 MG/DL (ref 65–99)
POTASSIUM SERPL-SCNC: 4.7 MMOL/L (ref 3.6–5.5)
SODIUM SERPL-SCNC: 133 MMOL/L (ref 135–145)

## 2023-07-11 PROCEDURE — 80048 BASIC METABOLIC PNL TOTAL CA: CPT

## 2023-07-11 PROCEDURE — 36415 COLL VENOUS BLD VENIPUNCTURE: CPT

## 2023-07-12 NOTE — PROGRESS NOTES
Chief Complaint   Patient presents with    Atrial Fibrillation     Follow up          Subjective:   Dean Nobles is a 68 y.o. male who presents today for follow-up.    Patient of Dr. Devlin.  Current medical problems include hypertension, dyslipidemia, paroxysmal atrial fibrillation S/P Successful RF ablation pulmonary vein isolation procedure by Dr. Davalos 23.    At his initial visit last month he was still in atrial fibrillation, asymptomatic. Though he described ongoing SOB with ADLs such as working in the yard or climbing flights of stairs.     His blood pressures were elevated and his medications were uptitrated.     Today he brings in a log of his blood pressures which remain elevated, especially the diastolic.  He is tolerating the spironolactone increase in benazepril increased without any problems.  His BMP was normal.  He recalls the amlodipine was discontinued previously for leg swelling.    He started using his BiPAP machine about 1 week ago.  Past Medical History:   Diagnosis Date    ADD (attention deficit disorder) 2012    Arrhythmia     Atrial fibrillation (HCC)     BPH (benign prostatic hyperplasia) 2012    Chickenpox     Depression     Dyslipidemia 2012    ED (erectile dysfunction) 2012    Frequent urination     Mongolian measles     Heartburn     HTN (hypertension), benign 2012    Hypertension     Influenza     Mumps     Rheumatic fever     Shortness of breath     Sleep apnea     awaiting cpap    Swelling of lower extremity     Vision loss     Wears glasses          Past Surgical History:   Procedure Laterality Date    EYE SURGERY      12/3/21 Lazer surgery to releive pressure     LAMINOTOMY           Family History   Problem Relation Age of Onset    Hyperlipidemia Mother     Heart Disease Mother          of heart attack at age of 80    Diabetes Mother         Type 2    Cancer Father         skin    Other Father         liver cirrhosis    Diabetes  Sister     Hypertension Sister     Kidney Disease Sister     Diabetes Sister     Hypertension Sister     Other Sister         Kidney failure    Hyperlipidemia Brother     Heart Disease Brother         arrhysmia    Atrial fibrillation Brother     Sleep Apnea Brother     Ovarian Cancer Neg Hx     Tubal Cancer Neg Hx     Peritoneal Cancer Neg Hx     Colorectal Cancer Neg Hx     Breast Cancer Neg Hx     Stroke Neg Hx          Social History     Tobacco Use   Smoking Status Former    Packs/day: 2.00    Years: 15.00    Pack years: 30.00    Types: Cigarettes    Quit date:     Years since quittin.5   Smokeless Tobacco Never   Tobacco Comments    16-30          Social History     Substance and Sexual Activity   Alcohol Use Yes    Alcohol/week: 21.6 oz    Types: 36 Glasses of wine per week    Comment: daily vodka - 4-5 cocktailes or wine - 4 glasses         No Known Allergies      Outpatient Encounter Medications as of 2023   Medication Sig Dispense Refill    tadalafil (CIALIS) 10 MG tablet Take 1 Tablet by mouth as needed for Erectile Dysfunction. 10 Tablet 3    carvedilol (COREG) 6.25 MG Tab Take 1 Tablet by mouth 2 times a day with meals. 60 Tablet 11    spironolactone (ALDACTONE) 50 MG Tab Take 1 Tablet by mouth every day. 90 Tablet 3    benazepril (LOTENSIN) 40 MG tablet Take 1 Tablet by mouth every day. 100 Tablet 3    ELIQUIS 5 MG Tab Take 5 mg by mouth 2 times a day.      atorvastatin (LIPITOR) 10 MG Tab Take 1 Tablet by mouth every day. 90 Tablet 3    flecainide (TAMBOCOR) 150 MG Tab Take 0.5 Tablets by mouth 2 times a day. 90 Tablet 3    escitalopram (LEXAPRO) 10 MG Tab Take 1 Tablet by mouth every day. 90 Tablet 3    escitalopram (LEXAPRO) 20 MG tablet Take 1 Tablet by mouth every day. 90 Tablet 3    latanoprost (XALATAN) 0.005 % Solution Administer 1 Drop into both eyes every day.      timolol (TIMOPTIC) 0.5 % Solution Administer 1 Drop into both eyes every day.      omeprazole (PRILOSEC) 20 MG  "delayed-release capsule Take 1 Cap by mouth every day. 90 Cap 3    [DISCONTINUED] amlodipine-benazepril (LOTREL) 5-20 MG per capsule Take 1 capsule every day by oral route. (Patient not taking: Reported on 7/19/2023)      [DISCONTINUED] sertraline (ZOLOFT) 100 MG Tab Take 1 Tablet by mouth every day.      [DISCONTINUED] amLODIPine (NORVASC) 5 MG Tab Take 1 Tablet by mouth every day. 90 Tablet 3    [DISCONTINUED] digoxin (LANOXIN) 125 MCG Tab Take 1 Tablet by mouth every evening. Please take 250mcg x 3 days, then 125mcg daily. 100 Tablet 3    [DISCONTINUED] tadalafil (CIALIS) 10 MG tablet Take 1 tablet by mouth as needed for Erectile Dysfunction. 10 tablet 3     No facility-administered encounter medications on file as of 7/19/2023.         Review of Systems   Constitutional:  Negative for chills and fever.        No unexpected weight changes   Respiratory:  Positive for shortness of breath. Negative for cough.    Cardiovascular:  Negative for chest pain, palpitations, orthopnea, leg swelling and PND.   Gastrointestinal:  Negative for nausea.   Genitourinary:         Erectile dysfunction   Neurological:  Negative for dizziness.          Objective:   /74 (BP Location: Left arm, Patient Position: Sitting)   Pulse 64   Resp 16   Ht 1.803 m (5' 11\")   Wt 105 kg (232 lb)   SpO2 95%   BMI 32.36 kg/m²        Physical Exam  Vitals reviewed.   Constitutional:       General: He is not in acute distress.  HENT:      Head: Normocephalic and atraumatic.   Eyes:      General: No scleral icterus.  Neck:      Comments: Elevated JVD  Cardiovascular:      Rate and Rhythm: Tachycardia present. Rhythm irregular.      Heart sounds: No murmur heard.     Comments: Radial pulses 2+ bilaterally  PT pulses 2+ bilaterally    Pulmonary:      Effort: Pulmonary effort is normal. No respiratory distress.      Breath sounds: No rales.   Abdominal:      General: There is no distension.   Musculoskeletal:      Right lower leg: No edema. "      Left lower leg: No edema.   Skin:     General: Skin is warm and dry.   Neurological:      General: No focal deficit present.      Mental Status: He is alert.      Gait: Gait normal.   Psychiatric:         Judgment: Judgment normal.            Assessment:     1. Essential hypertension  carvedilol (COREG) 6.25 MG Tab    DISCONTINUED: amLODIPine (NORVASC) 5 MG Tab      2. Erectile dysfunction due to arterial insufficiency  tadalafil (CIALIS) 10 MG tablet           Medical Decision Making:  Today's Assessment / Plan:      Paroxysmal atrial fibrillation S/P PVI ablation 6/16/23  - follow up with EP today. RRR on exam.     -continue eliquis 5mg PO bid, [id number] score of 2.   - stop digoxin 125mcg PO daily      Essential hypertension, uncontrolled  -spironolactone 50, mild hyponatremia to monitor  -benazepril 40 mg  - started bipap last week  - start carvedilol   - if BP>130/80 he will message and we can uptitrate BB vs re try amlodipine     Dyslipidemia  Continue lipitor 10mg PO Daily for now, will consider CCS in the future to dictate how aggressively we should control cholesterol     Leg swelling, resolved after CCB discontinued     RTC in 6mo

## 2023-07-19 ENCOUNTER — OFFICE VISIT (OUTPATIENT)
Dept: CARDIOLOGY | Facility: MEDICAL CENTER | Age: 68
End: 2023-07-19
Attending: PHYSICIAN ASSISTANT
Payer: MEDICARE

## 2023-07-19 ENCOUNTER — OFFICE VISIT (OUTPATIENT)
Dept: CARDIOLOGY | Facility: MEDICAL CENTER | Age: 68
End: 2023-07-19
Attending: NURSE PRACTITIONER
Payer: MEDICARE

## 2023-07-19 VITALS
WEIGHT: 232 LBS | DIASTOLIC BLOOD PRESSURE: 74 MMHG | HEIGHT: 71 IN | HEART RATE: 64 BPM | RESPIRATION RATE: 16 BRPM | SYSTOLIC BLOOD PRESSURE: 137 MMHG | BODY MASS INDEX: 32.48 KG/M2 | OXYGEN SATURATION: 95 %

## 2023-07-19 VITALS
HEART RATE: 64 BPM | OXYGEN SATURATION: 94 % | SYSTOLIC BLOOD PRESSURE: 132 MMHG | DIASTOLIC BLOOD PRESSURE: 80 MMHG | BODY MASS INDEX: 32.2 KG/M2 | HEIGHT: 71 IN | WEIGHT: 230 LBS | RESPIRATION RATE: 16 BRPM

## 2023-07-19 DIAGNOSIS — I10 ESSENTIAL HYPERTENSION: ICD-10-CM

## 2023-07-19 DIAGNOSIS — D68.69 SECONDARY HYPERCOAGULABLE STATE (HCC): ICD-10-CM

## 2023-07-19 DIAGNOSIS — N52.01 ERECTILE DYSFUNCTION DUE TO ARTERIAL INSUFFICIENCY: Chronic | ICD-10-CM

## 2023-07-19 DIAGNOSIS — G47.33 OBSTRUCTIVE SLEEP APNEA SYNDROME: ICD-10-CM

## 2023-07-19 DIAGNOSIS — Z98.890 S/P ABLATION OF ATRIAL FIBRILLATION: ICD-10-CM

## 2023-07-19 DIAGNOSIS — Z86.79 S/P ABLATION OF ATRIAL FIBRILLATION: ICD-10-CM

## 2023-07-19 DIAGNOSIS — Z79.01 ANTICOAGULATED: ICD-10-CM

## 2023-07-19 DIAGNOSIS — I48.0 PAROXYSMAL ATRIAL FIBRILLATION (HCC): Primary | ICD-10-CM

## 2023-07-19 PROCEDURE — 99213 OFFICE O/P EST LOW 20 MIN: CPT | Performed by: NURSE PRACTITIONER

## 2023-07-19 PROCEDURE — 99213 OFFICE O/P EST LOW 20 MIN: CPT | Performed by: PHYSICIAN ASSISTANT

## 2023-07-19 PROCEDURE — 3075F SYST BP GE 130 - 139MM HG: CPT | Performed by: PHYSICIAN ASSISTANT

## 2023-07-19 PROCEDURE — 99214 OFFICE O/P EST MOD 30 MIN: CPT | Performed by: NURSE PRACTITIONER

## 2023-07-19 PROCEDURE — 99212 OFFICE O/P EST SF 10 MIN: CPT | Performed by: NURSE PRACTITIONER

## 2023-07-19 PROCEDURE — 93005 ELECTROCARDIOGRAM TRACING: CPT | Performed by: NURSE PRACTITIONER

## 2023-07-19 PROCEDURE — 3075F SYST BP GE 130 - 139MM HG: CPT | Performed by: NURSE PRACTITIONER

## 2023-07-19 PROCEDURE — 3078F DIAST BP <80 MM HG: CPT | Performed by: PHYSICIAN ASSISTANT

## 2023-07-19 PROCEDURE — 3079F DIAST BP 80-89 MM HG: CPT | Performed by: NURSE PRACTITIONER

## 2023-07-19 RX ORDER — TADALAFIL 10 MG/1
10 TABLET ORAL PRN
Qty: 10 TABLET | Refills: 3 | Status: SHIPPED | OUTPATIENT
Start: 2023-07-19

## 2023-07-19 RX ORDER — AMLODIPINE BESYLATE 5 MG/1
5 TABLET ORAL DAILY
Qty: 90 TABLET | Refills: 3 | Status: SHIPPED | OUTPATIENT
Start: 2023-07-19 | End: 2023-07-19

## 2023-07-19 RX ORDER — CARVEDILOL 6.25 MG/1
6.25 TABLET ORAL 2 TIMES DAILY WITH MEALS
Qty: 60 TABLET | Refills: 11 | Status: SHIPPED | OUTPATIENT
Start: 2023-07-19

## 2023-07-19 RX ORDER — SERTRALINE HYDROCHLORIDE 100 MG/1
1 TABLET, FILM COATED ORAL
COMMUNITY
End: 2023-07-19

## 2023-07-19 RX ORDER — AMLODIPINE BESYLATE AND BENAZEPRIL HYDROCHLORIDE 5; 20 MG/1; MG/1
CAPSULE ORAL
COMMUNITY
End: 2023-07-19

## 2023-07-19 ASSESSMENT — ENCOUNTER SYMPTOMS
WEIGHT LOSS: 0
ORTHOPNEA: 0
PND: 0
ORTHOPNEA: 0
DIZZINESS: 0
FEVER: 0
PALPITATIONS: 0
PND: 0
CHILLS: 0
WEAKNESS: 0
PALPITATIONS: 0
NAUSEA: 0
CLAUDICATION: 0
SHORTNESS OF BREATH: 1
COUGH: 0
DIZZINESS: 0
SHORTNESS OF BREATH: 0

## 2023-07-19 ASSESSMENT — FIBROSIS 4 INDEX
FIB4 SCORE: 1.11
FIB4 SCORE: 1.11

## 2023-07-19 NOTE — PATIENT INSTRUCTIONS
Start Carvedilol 6.25mg twice a day medication for your blood pressure    Stop Digoxin     Goal blood pressure less than 130/80

## 2023-07-19 NOTE — PROGRESS NOTES
Chief Complaint   Patient presents with    Atrial Fibrillation     F/v Dx: S/P PVI ablation        Subjective     Dean Nobles is a 68 y.o. male who presents today for follow-up after undergoing PVI with Dr. Davalos in .    Patient was seen by Janeth Atkins PA-C on 2023. Patient was in AF/AFL, DCCV was considered but deferred to EP.  BP was poorly controlled do his Aldactone was increased to 50 mg daily and his benazepril was increased to 40 mg daily.     Patient was again seen today by Janeth Atkins PA-C, his BP remained elevated. His digoxin was discontinued and he was started on Coreg 6.25 mg BID.     Other past medical history significant for hypertension, AUGUSTIN on BiPAP, leg swelling on CCB and dyslipidemia.    Today patient states that he is overall feeling well. Reports that his femoral access sites healed well. Tolerating OAC well with no bleeding problems.       Past Medical History:   Diagnosis Date    ADD (attention deficit disorder) 2012    Arrhythmia     Atrial fibrillation (HCC)     BPH (benign prostatic hyperplasia) 2012    Chickenpox     Depression     Dyslipidemia 2012    ED (erectile dysfunction) 2012    Frequent urination     [place]ib[place]ian measles     Heartburn     HTN (hypertension), benign 2012    Hypertension     Influenza     Mumps     Rheumatic fever     Shortness of breath     Sleep apnea     awaiting cpap    Swelling of lower extremity     Vision loss     Wears glasses      Past Surgical History:   Procedure Laterality Date    EYE SURGERY      12/3/21 Lazer surgery to releive pressure     LAMINOTOMY       Family History   Problem Relation Age of Onset    Hyperlipidemia Mother     Heart Disease Mother          of heart attack at age of 80    Diabetes Mother         Type 2    Cancer Father         skin    Other Father         liver cirrhosis    Diabetes Sister     Hypertension Sister     Kidney Disease Sister     Diabetes Sister      Hypertension Sister     Other Sister         Kidney failure    Hyperlipidemia Brother     Heart Disease Brother         arrhysmia    Atrial fibrillation Brother     Sleep Apnea Brother     Ovarian Cancer Neg Hx     Tubal Cancer Neg Hx     Peritoneal Cancer Neg Hx     Colorectal Cancer Neg Hx     Breast Cancer Neg Hx     Stroke Neg Hx      Social History     Socioeconomic History    Marital status:      Spouse name: Not on file    Number of children: Not on file    Years of education: Not on file    Highest education level: Bachelor's degree (e.g., BA, AB, BS)   Occupational History    Not on file   Tobacco Use    Smoking status: Former     Packs/day: 2.00     Years: 15.00     Pack years: 30.00     Types: Cigarettes     Quit date:      Years since quittin.5    Smokeless tobacco: Never    Tobacco comments:     16-30   Vaping Use    Vaping Use: Never used   Substance and Sexual Activity    Alcohol use: Yes     Alcohol/week: 12.6 oz     Types: 21 Glasses of wine per week     Comment: daily vodka - 4-5 cocktailes or wine - 3 glasses    Drug use: No    Sexual activity: Yes     Partners: Female   Other Topics Concern    Not on file   Social History Narrative    Retired [employer], [employer] and all around the country.      Social Determinants of Health     Financial Resource Strain: Low Risk  (10/25/2022)    Overall Financial Resource Strain (CARDIA)     Difficulty of Paying Living Expenses: Not very hard   Food Insecurity: No Food Insecurity (10/25/2022)    Hunger Vital Sign     Worried About Running Out of Food in the Last Year: Never true     Ran Out of Food in the Last Year: Never true   Transportation Needs: No Transportation Needs (10/25/2022)    PRAPARE - Transportation     Lack of Transportation (Medical): No     Lack of Transportation (Non-Medical): No   Physical Activity: Insufficiently Active (10/25/2022)    Exercise Vital Sign     Days of Exercise per Week: 1 day     Minutes of Exercise  per Session: 10 min   Stress: Stress Concern Present (10/25/2022)    [place] [place] [employer] - Occupational Stress Questionnaire     Feeling of Stress : To some extent   Social Connections: Moderately Isolated (10/25/2022)    Social Connection and Isolation Panel [NHANES]     Frequency of Communication with Friends and Family: Three times a week     Frequency of Social Gatherings with Friends and Family: Never     Attends Oriental orthodox Services: Never     Active Member of Clubs or Organizations: No     Attends Club or Organization Meetings: Never     Marital Status:    Intimate Partner Violence: Not on file   Housing Stability: Unknown (10/25/2022)    Housing Stability Vital Sign     Unable to Pay for Housing in the Last Year: Patient refused     Number of Places Lived in the Last Year: 1     Unstable Housing in the Last Year: No     No Known Allergies  Outpatient Encounter Medications as of 7/19/2023   Medication Sig Dispense Refill    tadalafil (CIALIS) 10 MG tablet Take 1 Tablet by mouth as needed for Erectile Dysfunction. 10 Tablet 3    spironolactone (ALDACTONE) 50 MG Tab Take 1 Tablet by mouth every day. 90 Tablet 3    benazepril (LOTENSIN) 40 MG tablet Take 1 Tablet by mouth every day. 100 Tablet 3    ELIQUIS 5 MG Tab Take 5 mg by mouth 2 times a day.      atorvastatin (LIPITOR) 10 MG Tab Take 1 Tablet by mouth every day. 90 Tablet 3    flecainide (TAMBOCOR) 150 MG Tab Take 0.5 Tablets by mouth 2 times a day. 90 Tablet 3    escitalopram (LEXAPRO) 10 MG Tab Take 1 Tablet by mouth every day. 90 Tablet 3    escitalopram (LEXAPRO) 20 MG tablet Take 1 Tablet by mouth every day. 90 Tablet 3    latanoprost (XALATAN) 0.005 % Solution Administer 1 Drop into both eyes every day.      timolol (TIMOPTIC) 0.5 % Solution Administer 1 Drop into both eyes every day.      omeprazole (PRILOSEC) 20 MG delayed-release capsule Take 1 Cap by mouth every day. 90 Cap 3    [DISCONTINUED] amlodipine-benazepril  "(LOTREL) 5-20 MG per capsule Take 1 capsule every day by oral route. (Patient not taking: Reported on 7/19/2023)      [DISCONTINUED] sertraline (ZOLOFT) 100 MG Tab Take 1 Tablet by mouth every day.      [DISCONTINUED] digoxin (LANOXIN) 125 MCG Tab Take 1 Tablet by mouth every evening. Please take 250mcg x 3 days, then 125mcg daily. 100 Tablet 3    [DISCONTINUED] tadalafil (CIALIS) 10 MG tablet Take 1 tablet by mouth as needed for Erectile Dysfunction. 10 tablet 3     No facility-administered encounter medications on file as of 7/19/2023.     Review of Systems   Constitutional:  Negative for malaise/fatigue and weight loss.   Respiratory:  Negative for shortness of breath.    Cardiovascular:  Negative for chest pain, palpitations, orthopnea, claudication, leg swelling and PND.   Neurological:  Negative for dizziness and weakness.   All other systems reviewed and are negative.             Objective     /80 (BP Location: Left arm, Patient Position: Sitting, BP Cuff Size: Adult)   Pulse 64   Resp 16   Ht 1.803 m (5' 11\")   Wt 104 kg (230 lb)   SpO2 94%   BMI 32.08 kg/m²     Physical Exam  Constitutional:       General: He is not in acute distress.     Appearance: He is well-developed.   HENT:      Head: Normocephalic.   Eyes:      Extraocular Movements: Extraocular movements intact.   Neck:      Vascular: No carotid bruit or JVD.   Cardiovascular:      Rate and Rhythm: Normal rate and regular rhythm.   Pulmonary:      Effort: Pulmonary effort is normal.      Breath sounds: Normal breath sounds.   Abdominal:      General: There is no distension.      Palpations: Abdomen is soft.   Musculoskeletal:      Right lower leg: No edema.      Left lower leg: No edema.   Skin:     General: Skin is warm and dry.   Neurological:      Mental Status: He is alert and oriented to person, place, and time.   Psychiatric:         Behavior: Behavior normal.                Assessment & Plan     1. Paroxysmal atrial fibrillation " (HCC)  EKG      2. Anticoagulated        3. Essential hypertension        4. Secondary hypercoagulable state (HCC)        5. Obstructive sleep apnea syndrome        6. S/P ablation of atrial fibrillation            Medical Decision Making: Today's Assessment/Status/Plan:   Paroxysmal atrial fibrillation S/P PVI ablation 6/16/23  - Maintaining NSR (some recurrence early post ablation).   - OAC with Eliquis 5 mg BID, tolerating well with no bleeding problems.   - Continue Coreg 6.25 mg BID.  - Discussed post ablation healing expectation and duration with patient.     AUGUSTIN:  - Using BiPAP.      Essential hypertension:  - Remains above goal per home BP record.   - Continue spironolactone to 50 mg and benazepril to 40 mg.  - Started on Coreg 6.25 mg BID by Janeth Atkins today.      Patient will follow up with Dr. Davalos as scheduled below or earlier if needed. Encouraged patient to contact our office should any questions or concerns arise in the mean time.     Future Appointments   Date Time Provider Department Center   9/5/2023 11:00 AM Garcia Carrizales M.D. [facility] None   9/13/2023 10:40 AM Naga Davalos M.D. [facility] None   9/14/2023 12:40 PM [facility]

## 2023-07-21 LAB — EKG IMPRESSION: NORMAL

## 2023-07-21 PROCEDURE — 93010 ELECTROCARDIOGRAM REPORT: CPT | Performed by: INTERNAL MEDICINE

## 2023-08-22 ENCOUNTER — APPOINTMENT (OUTPATIENT)
Dept: TELEHEALTH | Facility: TELEMEDICINE | Age: 68
End: 2023-08-22
Payer: MEDICARE

## 2023-08-22 ENCOUNTER — OFFICE VISIT (OUTPATIENT)
Dept: URGENT CARE | Facility: PHYSICIAN GROUP | Age: 68
End: 2023-08-22
Payer: MEDICARE

## 2023-08-22 VITALS
TEMPERATURE: 97.7 F | SYSTOLIC BLOOD PRESSURE: 140 MMHG | RESPIRATION RATE: 18 BRPM | BODY MASS INDEX: 32.9 KG/M2 | WEIGHT: 235 LBS | OXYGEN SATURATION: 95 % | HEIGHT: 71 IN | HEART RATE: 71 BPM | DIASTOLIC BLOOD PRESSURE: 78 MMHG

## 2023-08-22 DIAGNOSIS — U07.1 COVID-19: ICD-10-CM

## 2023-08-22 DIAGNOSIS — R03.0 ELEVATED BLOOD PRESSURE READING: ICD-10-CM

## 2023-08-22 PROCEDURE — 3078F DIAST BP <80 MM HG: CPT | Performed by: PHYSICIAN ASSISTANT

## 2023-08-22 PROCEDURE — 3077F SYST BP >= 140 MM HG: CPT | Performed by: PHYSICIAN ASSISTANT

## 2023-08-22 PROCEDURE — 99213 OFFICE O/P EST LOW 20 MIN: CPT | Performed by: PHYSICIAN ASSISTANT

## 2023-08-22 ASSESSMENT — COPD QUESTIONNAIRES: COPD: 0

## 2023-08-22 ASSESSMENT — FIBROSIS 4 INDEX: FIB4 SCORE: 1.11

## 2023-08-22 ASSESSMENT — ENCOUNTER SYMPTOMS
SHORTNESS OF BREATH: 0
MYALGIAS: 1
HEADACHES: 1
SORE THROAT: 0
RHINORRHEA: 1
FEVER: 0
COUGH: 1

## 2023-08-23 NOTE — PROGRESS NOTES
Subjective:   Dean Nobles is a 68 y.o. male who presents for Cough (X 1 day cough, sore throat, headache, fatigue, SOB, runny nose. Pt. Took home test Positive)        Cough  This is a new problem. The current episode started yesterday. The problem has been gradually worsening. The problem occurs every few minutes. The cough is Non-productive. Associated symptoms include headaches, myalgias, nasal congestion and rhinorrhea. Pertinent negatives include no fever, sore throat or shortness of breath. Associated symptoms comments: fatigue. Treatments tried: nyquil, mucinex. The treatment provided mild relief. There is no history of COPD.     Review of Systems   Constitutional:  Negative for fever.   HENT:  Positive for rhinorrhea. Negative for sore throat.    Respiratory:  Positive for cough. Negative for shortness of breath.    Musculoskeletal:  Positive for myalgias.   Neurological:  Positive for headaches.       PMH:  has a past medical history of ADD (attention deficit disorder) (01/05/2012), Arrhythmia, Atrial fibrillation (HCC), BPH (benign prostatic hyperplasia) (01/05/2012), Chickenpox, Depression, Dyslipidemia (01/05/2012), ED (erectile dysfunction) (01/05/2012), Frequent urination, Croatian measles, Heartburn, HTN (hypertension), benign (01/05/2012), Hypertension, Influenza, Mumps, Rheumatic fever, Shortness of breath, Sleep apnea (2023), Swelling of lower extremity, Vision loss, and Wears glasses.  MEDS:   Current Outpatient Medications:     molnupiravir 200 MG capsule, Take 4 Capsules by mouth 2 times a day for 5 days., Disp: 40 Capsule, Rfl: 0    tadalafil (CIALIS) 10 MG tablet, Take 1 Tablet by mouth as needed for Erectile Dysfunction., Disp: 10 Tablet, Rfl: 3    carvedilol (COREG) 6.25 MG Tab, Take 1 Tablet by mouth 2 times a day with meals., Disp: 60 Tablet, Rfl: 11    spironolactone (ALDACTONE) 50 MG Tab, Take 1 Tablet by mouth every day., Disp: 90 Tablet, Rfl: 3    benazepril (LOTENSIN) 40 MG  "tablet, Take 1 Tablet by mouth every day., Disp: 100 Tablet, Rfl: 3    ELIQUIS 5 MG Tab, Take 5 mg by mouth 2 times a day., Disp: , Rfl:     atorvastatin (LIPITOR) 10 MG Tab, Take 1 Tablet by mouth every day., Disp: 90 Tablet, Rfl: 3    flecainide (TAMBOCOR) 150 MG Tab, Take 0.5 Tablets by mouth 2 times a day., Disp: 90 Tablet, Rfl: 3    escitalopram (LEXAPRO) 10 MG Tab, Take 1 Tablet by mouth every day., Disp: 90 Tablet, Rfl: 3    escitalopram (LEXAPRO) 20 MG tablet, Take 1 Tablet by mouth every day., Disp: 90 Tablet, Rfl: 3    latanoprost (XALATAN) 0.005 % Solution, Administer 1 Drop into both eyes every day., Disp: , Rfl:     timolol (TIMOPTIC) 0.5 % Solution, Administer 1 Drop into both eyes every day., Disp: , Rfl:     omeprazole (PRILOSEC) 20 MG delayed-release capsule, Take 1 Cap by mouth every day., Disp: 90 Cap, Rfl: 3  ALLERGIES: No Known Allergies  SURGHX:   Past Surgical History:   Procedure Laterality Date    EYE SURGERY      12/3/21 [facility] surgery to releive pressure     LAMINOTOMY       SOCHX:  reports that he quit smoking about 39 years ago. His smoking use included cigarettes. He started smoking about 54 years ago. He has a 30.0 pack-year smoking history. He has never used smokeless tobacco. He reports current alcohol use of about 12.6 oz of alcohol per week. He reports that he does not use drugs.  FH: Family history was reviewed, no pertinent findings to report   Objective:   BP (!) 140/78 (BP Location: Right arm, Patient Position: Sitting, BP Cuff Size: Adult)   Pulse 71   Temp 36.5 °C (97.7 °F) (Temporal)   Resp 18   Ht 1.803 m (5' 11\")   Wt 107 kg (235 lb)   SpO2 95%   BMI 32.78 kg/m²   Physical Exam  Vitals reviewed.   Constitutional:       General: He is not in acute distress.     Appearance: Normal appearance. He is well-developed. He is not toxic-appearing.   HENT:      Head: Normocephalic and atraumatic.      Right Ear: External ear normal.      Left Ear: External ear normal.      " Nose: Congestion present.   Cardiovascular:      Rate and Rhythm: Normal rate and regular rhythm.      Heart sounds: Normal heart sounds, S1 normal and S2 normal.   Pulmonary:      Effort: Pulmonary effort is normal. No respiratory distress.      Breath sounds: Normal breath sounds. No stridor. No decreased breath sounds, wheezing, rhonchi or rales.   Skin:     General: Skin is dry.   Neurological:      Comments: Alert and oriented.    Psychiatric:         Speech: Speech normal.         Behavior: Behavior normal.           Assessment/Plan:   1. COVID-19  - molnupiravir 200 MG capsule; Take 4 Capsules by mouth 2 times a day for 5 days.  Dispense: 40 Capsule; Refill: 0    2. Elevated blood pressure reading    Testing positive for COVID-19.  Patient has good understanding of etiology and disease course.  Quarantine in accordance with CDC guidelines.      Patient does not qualify for treatment with Paxlovid due to medication interactions.  He was given a prescription for molnupiravir instead.  Recommend symptomatic care:  12-hour Mucinex s needed for congestion.  Patient may begin nasal saline rinses 2-3 times a day and start Flonase daily.  Antitussives as needed for cough.  Tylenol or ibuprofen as needed for fevers, body aches, headaches.  Rest and ensure adequate hydration.  Recommend reevaluation with any new or worsening symptoms.    If patient experiences chest pain, pain with breathing, shortness of breath, difficulty speaking in full sentences, severe weakness or dizziness they should call 911 and go to the nearest emergency department for further evaluation.

## 2023-09-05 ENCOUNTER — OFFICE VISIT (OUTPATIENT)
Dept: SLEEP MEDICINE | Facility: MEDICAL CENTER | Age: 68
End: 2023-09-05
Attending: STUDENT IN AN ORGANIZED HEALTH CARE EDUCATION/TRAINING PROGRAM
Payer: MEDICARE

## 2023-09-05 VITALS
HEART RATE: 61 BPM | BODY MASS INDEX: 32.34 KG/M2 | HEIGHT: 71 IN | RESPIRATION RATE: 16 BRPM | DIASTOLIC BLOOD PRESSURE: 68 MMHG | OXYGEN SATURATION: 94 % | WEIGHT: 231 LBS | SYSTOLIC BLOOD PRESSURE: 112 MMHG

## 2023-09-05 DIAGNOSIS — G47.33 OSA (OBSTRUCTIVE SLEEP APNEA): Primary | ICD-10-CM

## 2023-09-05 PROCEDURE — 99212 OFFICE O/P EST SF 10 MIN: CPT | Performed by: STUDENT IN AN ORGANIZED HEALTH CARE EDUCATION/TRAINING PROGRAM

## 2023-09-05 PROCEDURE — 3078F DIAST BP <80 MM HG: CPT | Performed by: STUDENT IN AN ORGANIZED HEALTH CARE EDUCATION/TRAINING PROGRAM

## 2023-09-05 PROCEDURE — 99213 OFFICE O/P EST LOW 20 MIN: CPT | Performed by: STUDENT IN AN ORGANIZED HEALTH CARE EDUCATION/TRAINING PROGRAM

## 2023-09-05 PROCEDURE — 3074F SYST BP LT 130 MM HG: CPT | Performed by: STUDENT IN AN ORGANIZED HEALTH CARE EDUCATION/TRAINING PROGRAM

## 2023-09-05 ASSESSMENT — FIBROSIS 4 INDEX: FIB4 SCORE: 1.11

## 2023-09-05 NOTE — PROGRESS NOTES
[facility] Follow-up Visit    CC: Follow-up regarding first compliance after receiving BiPAP machine      HPI:  Dean Nobles is a 68 y.o.male  with MDD, GERD, dyslipidemia, obesity, atrial fibrillation, and obstructive sleep apnea on BiPAP.  Presents today to follow-up regarding management of obstructive sleep apnea.    He reports he is doing well.  He is using his BiPAP machine regularly.  He is finding the mask and pressures comfortable.  He states with using his BiPAP he is not waking up as much at night.  In addition he is waking up feeling more rested and he is less tired during the day.  Overall has no acute complaints.    He does notice that his mask changes position at night at times.  He does notice some discomfort regarding his septum.  He has not tried a different mask since receiving his BiPAP machine.    ESS 5    DME provider: [employer]  Device: Aircurve 10   Mask: hybrid fullface   Aerophagia: No   Snoring: No   Dry mouth: Yes at times   Leak: Yes occasionally   Skin irritation: No   Chin strap: No     Sleep History  4/13/2023 PSG split-night study showed severe obstructive sleep apnea with an overall AHI of 55.6, minimal oxygen saturation of 85%, time at or below 88% saturation 14.6 minutes.  Patient was tried on CPAP and BiPAP during night of study.  Respiratory events and oxygen saturations improved with BiPAP therapy.  Recommended auto BiPAP EPAP 12 IPAP 17 pressure support 4    Patient Active Problem List    Diagnosis Date Noted    Anticoagulated 04/24/2023    Secondary hypercoagulable state (HCC) 03/22/2023    Obstructive sleep apnea syndrome 03/22/2023    Leg swelling 03/22/2023    Paroxysmal atrial fibrillation (HCC) 02/15/2023    Other specified glaucoma 10/26/2022    Blood in stool 12/07/2021    Rash 02/09/2021    Pro's esophagus 10/16/2020    Class 1 obesity due to excess calories without serious comorbidity with body mass index (BMI) of 33.0 to 33.9 in adult 01/30/2017     Essential hypertension 2012    Dyslipidemia 2012    Moderate episode of recurrent major depressive disorder (HCC) 2012    BPH (benign prostatic hyperplasia) 2012    ADD (attention deficit disorder) 2012    ED (erectile dysfunction) 2012       Past Medical History:   Diagnosis Date    ADD (attention deficit disorder) 2012    Arrhythmia     Atrial fibrillation (HCC)     BPH (benign prostatic hyperplasia) 2012    Chickenpox     Depression     Dyslipidemia 2012    ED (erectile dysfunction) 2012    Frequent urination     [place] measles     Heartburn     HTN (hypertension), benign 2012    Hypertension     Influenza     Mumps     Rheumatic fever     Shortness of breath     Sleep apnea     awaiting cpap    Swelling of lower extremity     Vision loss     Wears glasses         Past Surgical History:   Procedure Laterality Date    EYE SURGERY      12/3/21 Lazer surgery to releive pressure     LAMINOTOMY         Family History   Problem Relation Age of Onset    Hyperlipidemia Mother     Heart Disease Mother          of heart attack at age of 80    Diabetes Mother         Type 2    Cancer Father         skin    Other Father         liver cirrhosis    Diabetes Sister     Hypertension Sister     Kidney Disease Sister     Diabetes Sister     Hypertension Sister     Other Sister         Kidney failure    Hyperlipidemia Brother     Heart Disease Brother         arrhysmia    Atrial fibrillation Brother     Sleep Apnea Brother     Ovarian Cancer Neg Hx     Tubal Cancer Neg Hx     Peritoneal Cancer Neg Hx     Colorectal Cancer Neg Hx     Breast Cancer Neg Hx     Stroke Neg Hx        Social History     Socioeconomic History    Marital status:      Spouse name: Not on file    Number of children: Not on file    Years of education: Not on file    Highest education level: Bachelor's degree (e.g., BA, AB, BS)   Occupational History    Not on file   Tobacco Use     Smoking status: Former     Current packs/day: 0.00     Average packs/day: 2.0 packs/day for 15.0 years (30.0 ttl pk-yrs)     Types: Cigarettes     Start date:      Quit date:      Years since quittin.7    Smokeless tobacco: Never    Tobacco comments:     16-30   Vaping Use    Vaping Use: Never used   Substance and Sexual Activity    Alcohol use: Yes     Alcohol/week: 12.6 oz     Types: 21 Glasses of wine per week     Comment: daily vodka - 4-5 cocktailes or wine - 3 glasses    Drug use: No    Sexual activity: Yes     Partners: Female   Other Topics Concern    Not on file   Social History Narrative    Retired [employer], [employer] and all around the country.      Social Determinants of Health     Financial Resource Strain: Low Risk  (10/25/2022)    Overall Financial Resource Strain (CARDIA)     Difficulty of Paying Living Expenses: Not very hard   Food Insecurity: No Food Insecurity (10/25/2022)    Hunger Vital Sign     Worried About Running Out of Food in the Last Year: Never true     Ran Out of Food in the Last Year: Never true   Transportation Needs: No Transportation Needs (10/25/2022)    PRAPARE - Transportation     Lack of Transportation (Medical): No     Lack of Transportation (Non-Medical): No   Physical Activity: Insufficiently Active (10/25/2022)    Exercise Vital Sign     Days of Exercise per Week: 1 day     Minutes of Exercise per Session: 10 min   Stress: Stress Concern Present (10/25/2022)    [employer] [place] of [employer] - Occupational Stress Questionnaire     Feeling of Stress : To some extent   Social Connections: Moderately Isolated (10/25/2022)    Social Connection and Isolation Panel [NHANES]     Frequency of Communication with Friends and Family: Three times a week     Frequency of Social Gatherings with Friends and Family: Never     Attends Jehovah's witness Services: Never     Active Member of Clubs or Organizations: No     Attends Club or Organization Meetings: Never      Marital Status:    Intimate Partner Violence: Not on file   Housing Stability: Unknown (10/25/2022)    Housing Stability Vital Sign     Unable to Pay for Housing in the Last Year: Patient refused     Number of Places Lived in the Last Year: 1     Unstable Housing in the Last Year: No       Current Outpatient Medications   Medication Sig Dispense Refill    tadalafil (CIALIS) 10 MG tablet Take 1 Tablet by mouth as needed for Erectile Dysfunction. 10 Tablet 3    carvedilol (COREG) 6.25 MG Tab Take 1 Tablet by mouth 2 times a day with meals. 60 Tablet 11    spironolactone (ALDACTONE) 50 MG Tab Take 1 Tablet by mouth every day. 90 Tablet 3    benazepril (LOTENSIN) 40 MG tablet Take 1 Tablet by mouth every day. 100 Tablet 3    ELIQUIS 5 MG Tab Take 5 mg by mouth 2 times a day.      atorvastatin (LIPITOR) 10 MG Tab Take 1 Tablet by mouth every day. 90 Tablet 3    flecainide (TAMBOCOR) 150 MG Tab Take 0.5 Tablets by mouth 2 times a day. 90 Tablet 3    escitalopram (LEXAPRO) 10 MG Tab Take 1 Tablet by mouth every day. 90 Tablet 3    escitalopram (LEXAPRO) 20 MG tablet Take 1 Tablet by mouth every day. 90 Tablet 3    latanoprost (XALATAN) 0.005 % Solution Administer 1 Drop into both eyes every day.      timolol (TIMOPTIC) 0.5 % Solution Administer 1 Drop into both eyes every day.      omeprazole (PRILOSEC) 20 MG delayed-release capsule Take 1 Cap by mouth every day. 90 Cap 3     No current facility-administered medications for this visit.        ALLERGIES: Patient has no known allergies.    ROS  Constitutional: Denies fevers, Denies weight changes  Ears/Nose/Throat/Mouth: Denies nasal congestion or sore throat   Cardiovascular: Denies chest pain  Respiratory: Denies shortness of breath, Denies cough  Gastrointestinal/Hepatic: Denies nausea, vomiting  Sleep: see HPI      PHYSICAL EXAM  /68 (BP Location: Left arm, Patient Position: Sitting, BP Cuff Size: Large adult)   Pulse 61   Resp 16   Ht 1.803 m (5'  "11\")   Wt 105 kg (231 lb)   SpO2 94%   BMI 32.22 kg/m²   Appearance: Well-nourished, well-developed, no acute distress  Eyes:  No scleral icterus , EOMI  Musculoskeletal:  Grossly normal; gait and station normal; digits and nails normal  Skin:  No rashes, petechiae, cyanosis  Neurologic: without focal signs; oriented to person, time, place, and purpose; judgement intact      Medical Decision Making   Assessment and Plan  Dean Nobles is a 68 y.o.male  with MDD, GERD, dyslipidemia, obesity, atrial fibrillation, and obstructive sleep apnea on BiPAP.  Presents today to follow-up regarding management of obstructive sleep apnea.    The medical record was reviewed.    Obstructive sleep apnea  Compliance data reviewed showing 93% usage > 4hours in last 30  days. Average AHI 2.0 events/hour. Pt continues to use and benefit from machine.      Current Settings auto BiPAP EPAP 12 IPAP 17 pressure support 4    Advised may benefit from changing facemasks.  Recommended he reach out to his DME company regarding this.    PLAN:   -Order placed for mask and supplies   -Advised to reach out via [employer] with questions     Has been advised to continue the current BiPAP, clean equipment frequently, and get new mask and supplies as allowed by insurance and DME. Recommend an earlier appointment, if significant treatment barriers develop.    Patients with AUGUSTIN are at increased risk of cardiovascular disease including coronary artery disease, systemic arterial hypertension, pulmonary arterial hypertension, cardiac arrythmias, and stroke. The patient was advised to avoid driving a motor vehicle when drowsy.    Positive airway pressure will favorably impact many of the adverse conditions and effects provoked by AUGUSTIN.    Have advised the patient to follow up with the appropriate healthcare practitioners for all other medical problems and issues.    Return in about 1 year (around 9/5/2024).      Please note portions of this record was " created using voice recognition software. I have made every reasonable attempt to correct obvious errors, but I expect that there are errors of grammar and possibly content I did not discover before finalizing the note.

## 2023-09-05 NOTE — PATIENT INSTRUCTIONS
Equipment replacement schedule:  Mask cushion every month  Mask every 6 months  Head gear every 6 months  Tubing every 3 months  Disposable filters 2 times per month  Reusable filter every 6 months  Humidifier chamber every 6 months  Chin strap every 6 months

## 2023-09-13 ENCOUNTER — NON-PROVIDER VISIT (OUTPATIENT)
Dept: CARDIOLOGY | Facility: MEDICAL CENTER | Age: 68
End: 2023-09-13
Attending: INTERNAL MEDICINE
Payer: MEDICARE

## 2023-09-13 VITALS
OXYGEN SATURATION: 96 % | RESPIRATION RATE: 18 BRPM | HEIGHT: 71 IN | WEIGHT: 233 LBS | DIASTOLIC BLOOD PRESSURE: 66 MMHG | HEART RATE: 70 BPM | SYSTOLIC BLOOD PRESSURE: 132 MMHG | BODY MASS INDEX: 32.62 KG/M2

## 2023-09-13 DIAGNOSIS — I47.19 PAT (PAROXYSMAL ATRIAL TACHYCARDIA) (HCC): ICD-10-CM

## 2023-09-13 DIAGNOSIS — G47.33 OBSTRUCTIVE SLEEP APNEA SYNDROME: ICD-10-CM

## 2023-09-13 DIAGNOSIS — I48.0 PAROXYSMAL ATRIAL FIBRILLATION (HCC): ICD-10-CM

## 2023-09-13 DIAGNOSIS — D68.69 SECONDARY HYPERCOAGULABLE STATE (HCC): ICD-10-CM

## 2023-09-13 DIAGNOSIS — E78.5 DYSLIPIDEMIA: Chronic | ICD-10-CM

## 2023-09-13 DIAGNOSIS — Z78.9 ALCOHOL USE: ICD-10-CM

## 2023-09-13 DIAGNOSIS — Z98.890 S/P ABLATION OF ATRIAL FIBRILLATION: ICD-10-CM

## 2023-09-13 DIAGNOSIS — I47.10 SVT (SUPRAVENTRICULAR TACHYCARDIA) (HCC): ICD-10-CM

## 2023-09-13 DIAGNOSIS — I49.1 APC (ATRIAL PREMATURE CONTRACTIONS): ICD-10-CM

## 2023-09-13 DIAGNOSIS — Z79.01 ANTICOAGULATED: ICD-10-CM

## 2023-09-13 DIAGNOSIS — I10 ESSENTIAL HYPERTENSION: ICD-10-CM

## 2023-09-13 DIAGNOSIS — Z86.79 S/P ABLATION OF ATRIAL FIBRILLATION: ICD-10-CM

## 2023-09-13 DIAGNOSIS — I49.3 PVC'S (PREMATURE VENTRICULAR CONTRACTIONS): ICD-10-CM

## 2023-09-13 PROBLEM — F10.90 ALCOHOL USE: Status: ACTIVE | Noted: 2023-09-13

## 2023-09-13 LAB — EKG IMPRESSION: NORMAL

## 2023-09-13 PROCEDURE — 99213 OFFICE O/P EST LOW 20 MIN: CPT | Performed by: INTERNAL MEDICINE

## 2023-09-13 PROCEDURE — 93005 ELECTROCARDIOGRAM TRACING: CPT | Mod: XE | Performed by: INTERNAL MEDICINE

## 2023-09-13 PROCEDURE — 93010 ELECTROCARDIOGRAM REPORT: CPT | Performed by: INTERNAL MEDICINE

## 2023-09-13 PROCEDURE — 3078F DIAST BP <80 MM HG: CPT | Performed by: INTERNAL MEDICINE

## 2023-09-13 PROCEDURE — 99214 OFFICE O/P EST MOD 30 MIN: CPT | Performed by: INTERNAL MEDICINE

## 2023-09-13 PROCEDURE — 3075F SYST BP GE 130 - 139MM HG: CPT | Performed by: INTERNAL MEDICINE

## 2023-09-13 PROCEDURE — 93246 EXT ECG>7D<15D RECORDING: CPT

## 2023-09-13 ASSESSMENT — FIBROSIS 4 INDEX: FIB4 SCORE: 1.11

## 2023-09-13 NOTE — PROGRESS NOTES
Patient enrolled in the 14 day ePatch Holter monitoring program per Naga Davalos MD. In clinic hook up, monitor s/n [id number]. Pending EOS.

## 2023-09-13 NOTE — PROGRESS NOTES
Chief Complaint   Patient presents with    Atrial Fibrillation     F/v Dx:Paroxysmal atrial fibrillation        Subjective     Dean Nobles is a 68 y.o. male who presents today status post PVI.  History of atrial fibrillation 80% on previous Zio.  Currently on flecainide.  Feels improved.  No further chest pain.  Hypertension on appropriate medications.  On statins.  On anticoagulation.  Some excess alcohol up to 4 drinks per day.    Past Medical History:   Diagnosis Date    ADD (attention deficit disorder) 2012    Arrhythmia     Atrial fibrillation (HCC)     BPH (benign prostatic hyperplasia) 2012    Chickenpox     Depression     Dyslipidemia 2012    ED (erectile dysfunction) 2012    Frequent urination     [place] measles     Heartburn     HTN (hypertension), benign 2012    Hypertension     Influenza     Mumps     Rheumatic fever     Shortness of breath     Sleep apnea     awaiting cpap    Swelling of lower extremity     Vision loss     Wears glasses      Past Surgical History:   Procedure Laterality Date    EYE SURGERY      12/3/21 Lazer surgery to releive pressure     LAMINOTOMY       Family History   Problem Relation Age of Onset    Hyperlipidemia Mother     Heart Disease Mother          of heart attack at age of 80    Diabetes Mother         Type 2    Cancer Father         skin    Other Father         liver cirrhosis    Diabetes Sister     Hypertension Sister     Kidney Disease Sister     Diabetes Sister     Hypertension Sister     Other Sister         Kidney failure    Hyperlipidemia Brother     Heart Disease Brother         arrhysmia    Atrial fibrillation Brother     Sleep Apnea Brother     Ovarian Cancer Neg Hx     Tubal Cancer Neg Hx     Peritoneal Cancer Neg Hx     Colorectal Cancer Neg Hx     Breast Cancer Neg Hx     Stroke Neg Hx      Social History     Socioeconomic History    Marital status:      Spouse name: Not on file    Number of children: Not on  file    Years of education: Not on file    Highest education level: Bachelor's degree (e.g., BA, AB, BS)   Occupational History    Not on file   Tobacco Use    Smoking status: Former     Current packs/day: 0.00     Average packs/day: 2.0 packs/day for 15.0 years (30.0 ttl pk-yrs)     Types: Cigarettes     Start date:      Quit date:      Years since quittin.7    Smokeless tobacco: Never    Tobacco comments:     16-30   Vaping Use    Vaping Use: Never used   Substance and Sexual Activity    Alcohol use: Yes     Alcohol/week: 12.6 oz     Types: 21 Glasses of wine per week     Comment: daily vodka - 4-5 cocktailes or wine - 3 glasses    Drug use: No    Sexual activity: Yes     Partners: Female   Other Topics Concern    Not on file   Social History Narrative    Retired [employer], [employer] and all around the country.      Social Determinants of Health     Financial Resource Strain: Low Risk  (10/25/2022)    Overall Financial Resource Strain (CARDIA)     Difficulty of Paying Living Expenses: Not very hard   Food Insecurity: No Food Insecurity (10/25/2022)    Hunger Vital Sign     Worried About Running Out of Food in the Last Year: Never true     Ran Out of Food in the Last Year: Never true   Transportation Needs: No Transportation Needs (10/25/2022)    PRAPARE - Transportation     Lack of Transportation (Medical): No     Lack of Transportation (Non-Medical): No   Physical Activity: Insufficiently Active (10/25/2022)    Exercise Vital Sign     Days of Exercise per Week: 1 day     Minutes of Exercise per Session: 10 min   Stress: Stress Concern Present (10/25/2022)    [employer] [place] [employer] - Occupational Stress Questionnaire     Feeling of Stress : To some extent   Social Connections: Moderately Isolated (10/25/2022)    Social Connection and Isolation Panel [NHANES]     Frequency of Communication with Friends and Family: Three times a week     Frequency of Social Gatherings with  "Friends and Family: Never     Attends [employer]ish Services: Never     Active Member of Clubs or Organizations: No     Attends Club or Organization Meetings: Never     Marital Status:    Intimate Partner Violence: Not on file   Housing Stability: Unknown (10/25/2022)    Housing Stability Vital Sign     Unable to Pay for Housing in the Last Year: Patient refused     Number of Places Lived in the Last Year: 1     Unstable Housing in the Last Year: No     No Known Allergies  Outpatient Encounter Medications as of 9/13/2023   Medication Sig Dispense Refill    tadalafil (CIALIS) 10 MG tablet Take 1 Tablet by mouth as needed for Erectile Dysfunction. 10 Tablet 3    carvedilol (COREG) 6.25 MG Tab Take 1 Tablet by mouth 2 times a day with meals. 60 Tablet 11    spironolactone (ALDACTONE) 50 MG Tab Take 1 Tablet by mouth every day. 90 Tablet 3    benazepril (LOTENSIN) 40 MG tablet Take 1 Tablet by mouth every day. 100 Tablet 3    ELIQUIS 5 MG Tab Take 5 mg by mouth 2 times a day.      atorvastatin (LIPITOR) 10 MG Tab Take 1 Tablet by mouth every day. 90 Tablet 3    escitalopram (LEXAPRO) 10 MG Tab Take 1 Tablet by mouth every day. 90 Tablet 3    escitalopram (LEXAPRO) 20 MG tablet Take 1 Tablet by mouth every day. 90 Tablet 3    latanoprost (XALATAN) 0.005 % Solution Administer 1 Drop into both eyes every day.      timolol (TIMOPTIC) 0.5 % Solution Administer 1 Drop into both eyes every day.      omeprazole (PRILOSEC) 20 MG delayed-release capsule Take 1 Cap by mouth every day. 90 Cap 3    [DISCONTINUED] flecainide (TAMBOCOR) 150 MG Tab Take 0.5 Tablets by mouth 2 times a day. 90 Tablet 3     No facility-administered encounter medications on file as of 9/13/2023.     ROS           Objective     /66 (BP Location: Left arm, Patient Position: Sitting, BP Cuff Size: Adult)   Pulse 70   Resp 18   Ht 1.803 m (5' 11\")   Wt 106 kg (233 lb)   SpO2 96%   BMI 32.50 kg/m²     Physical Exam  Constitutional:       " Appearance: He is well-developed.   HENT:      Head: Normocephalic and atraumatic.   Cardiovascular:      Rate and Rhythm: Normal rate and regular rhythm.      Heart sounds: No murmur heard.     No friction rub. No gallop.   Pulmonary:      Effort: Pulmonary effort is normal.      Breath sounds: Normal breath sounds.   Abdominal:      Palpations: Abdomen is soft.   Musculoskeletal:         General: Normal range of motion.      Cervical back: Normal range of motion and neck supple.   Skin:     General: Skin is warm and dry.   Neurological:      Mental Status: He is alert and oriented to person, place, and time.   Psychiatric:         Behavior: Behavior normal.         Thought Content: Thought content normal.         Judgment: Judgment normal.                Assessment & Plan     1. Paroxysmal atrial fibrillation (HCC)  EKG      2. Secondary hypercoagulable state (HCC)        3. Anticoagulated        4. Essential hypertension        5. Dyslipidemia        6. Obstructive sleep apnea syndrome        7. S/P ablation of atrial fibrillation  Holter Monitor Study      8. Alcohol use            Medical Decision Making: Today's Assessment/Status/Plan:   1.  Atrial fibrillation status post ablation clinically improved.  Check Zio patch.  2.  DC flecainide.  3.  Hypertension continue current regimen.  4.  Dyslipidemia continue statins.  5.  Sleep apnea treated.  6.  Mild excess alcohol.  Asked him to cut back.  7.  Follow-up with me in 6 months.

## 2023-10-03 ENCOUNTER — TELEPHONE (OUTPATIENT)
Dept: CARDIOLOGY | Facility: MEDICAL CENTER | Age: 68
End: 2023-10-03
Payer: MEDICARE

## 2023-10-04 PROCEDURE — 93248 EXT ECG>7D<15D REV&INTERPJ: CPT | Performed by: INTERNAL MEDICINE

## 2023-10-16 ENCOUNTER — PATIENT MESSAGE (OUTPATIENT)
Dept: MEDICAL GROUP | Facility: MEDICAL CENTER | Age: 68
End: 2023-10-16
Payer: MEDICARE

## 2023-10-16 RX ORDER — ESCITALOPRAM OXALATE 10 MG/1
10 TABLET ORAL DAILY
Qty: 30 TABLET | Refills: 1 | Status: SHIPPED | OUTPATIENT
Start: 2023-10-16 | End: 2023-11-14 | Stop reason: SDUPTHER

## 2023-10-16 RX ORDER — ESCITALOPRAM OXALATE 20 MG/1
20 TABLET ORAL DAILY
Qty: 30 TABLET | Refills: 1 | Status: SHIPPED | OUTPATIENT
Start: 2023-10-16 | End: 2023-11-14 | Stop reason: SDUPTHER

## 2023-11-01 ENCOUNTER — DOCUMENTATION (OUTPATIENT)
Dept: HEALTH INFORMATION MANAGEMENT | Facility: OTHER | Age: 68
End: 2023-11-01
Payer: MEDICARE

## 2023-11-06 ENCOUNTER — TELEPHONE (OUTPATIENT)
Dept: HEALTH INFORMATION MANAGEMENT | Facility: OTHER | Age: 68
End: 2023-11-06
Payer: MEDICARE

## 2023-11-12 SDOH — ECONOMIC STABILITY: FOOD INSECURITY: WITHIN THE PAST 12 MONTHS, YOU WORRIED THAT YOUR FOOD WOULD RUN OUT BEFORE YOU GOT MONEY TO BUY MORE.: NEVER TRUE

## 2023-11-12 SDOH — HEALTH STABILITY: PHYSICAL HEALTH: ON AVERAGE, HOW MANY MINUTES DO YOU ENGAGE IN EXERCISE AT THIS LEVEL?: 0 MIN

## 2023-11-12 SDOH — ECONOMIC STABILITY: FOOD INSECURITY: WITHIN THE PAST 12 MONTHS, THE FOOD YOU BOUGHT JUST DIDN'T LAST AND YOU DIDN'T HAVE MONEY TO GET MORE.: NEVER TRUE

## 2023-11-12 SDOH — ECONOMIC STABILITY: INCOME INSECURITY: HOW HARD IS IT FOR YOU TO PAY FOR THE VERY BASICS LIKE FOOD, HOUSING, MEDICAL CARE, AND HEATING?: NOT VERY HARD

## 2023-11-12 SDOH — ECONOMIC STABILITY: HOUSING INSECURITY: IN THE LAST 12 MONTHS, HOW MANY PLACES HAVE YOU LIVED?: 2

## 2023-11-12 SDOH — ECONOMIC STABILITY: INCOME INSECURITY: IN THE LAST 12 MONTHS, WAS THERE A TIME WHEN YOU WERE NOT ABLE TO PAY THE MORTGAGE OR RENT ON TIME?: NO

## 2023-11-12 SDOH — HEALTH STABILITY: PHYSICAL HEALTH: ON AVERAGE, HOW MANY DAYS PER WEEK DO YOU ENGAGE IN MODERATE TO STRENUOUS EXERCISE (LIKE A BRISK WALK)?: 0 DAYS

## 2023-11-12 SDOH — ECONOMIC STABILITY: TRANSPORTATION INSECURITY

## 2023-11-12 SDOH — HEALTH STABILITY: MENTAL HEALTH
STRESS IS WHEN SOMEONE FEELS TENSE, NERVOUS, ANXIOUS, OR CAN'T SLEEP AT NIGHT BECAUSE THEIR MIND IS TROUBLED. HOW STRESSED ARE YOU?: NOT AT ALL

## 2023-11-12 ASSESSMENT — SOCIAL DETERMINANTS OF HEALTH (SDOH)
HOW OFTEN DO YOU ATTEND CHURCH OR RELIGIOUS SERVICES?: NEVER
WITHIN THE PAST 12 MONTHS, YOU WORRIED THAT YOUR FOOD WOULD RUN OUT BEFORE YOU GOT THE MONEY TO BUY MORE: NEVER TRUE
DO YOU BELONG TO ANY CLUBS OR ORGANIZATIONS SUCH AS CHURCH GROUPS UNIONS, FRATERNAL OR ATHLETIC GROUPS, OR SCHOOL GROUPS?: NO
HOW HARD IS IT FOR YOU TO PAY FOR THE VERY BASICS LIKE FOOD, HOUSING, MEDICAL CARE, AND HEATING?: NOT VERY HARD
DO YOU BELONG TO ANY CLUBS OR ORGANIZATIONS SUCH AS CHURCH GROUPS UNIONS, FRATERNAL OR ATHLETIC GROUPS, OR SCHOOL GROUPS?: NO
HOW MANY DRINKS CONTAINING ALCOHOL DO YOU HAVE ON A TYPICAL DAY WHEN YOU ARE DRINKING: 3 OR 4
IN A TYPICAL WEEK, HOW MANY TIMES DO YOU TALK ON THE PHONE WITH FAMILY, FRIENDS, OR NEIGHBORS?: THREE TIMES A WEEK
IN A TYPICAL WEEK, HOW MANY TIMES DO YOU TALK ON THE PHONE WITH FAMILY, FRIENDS, OR NEIGHBORS?: THREE TIMES A WEEK
HOW OFTEN DO YOU HAVE SIX OR MORE DRINKS ON ONE OCCASION: MONTHLY
HOW OFTEN DO YOU GET TOGETHER WITH FRIENDS OR RELATIVES?: ONCE A WEEK
HOW OFTEN DO YOU ATTEND CHURCH OR RELIGIOUS SERVICES?: NEVER
HOW OFTEN DO YOU ATTENT MEETINGS OF THE CLUB OR ORGANIZATION YOU BELONG TO?: NEVER
HOW OFTEN DO YOU GET TOGETHER WITH FRIENDS OR RELATIVES?: ONCE A WEEK
HOW OFTEN DO YOU ATTENT MEETINGS OF THE CLUB OR ORGANIZATION YOU BELONG TO?: NEVER
HOW OFTEN DO YOU HAVE A DRINK CONTAINING ALCOHOL: 4 OR MORE TIMES A WEEK

## 2023-11-12 ASSESSMENT — LIFESTYLE VARIABLES
HOW MANY STANDARD DRINKS CONTAINING ALCOHOL DO YOU HAVE ON A TYPICAL DAY: 3 OR 4
HOW OFTEN DO YOU HAVE SIX OR MORE DRINKS ON ONE OCCASION: MONTHLY
AUDIT-C TOTAL SCORE: 7
HOW OFTEN DO YOU HAVE A DRINK CONTAINING ALCOHOL: 4 OR MORE TIMES A WEEK
SKIP TO QUESTIONS 9-10: 0

## 2023-11-14 ENCOUNTER — OFFICE VISIT (OUTPATIENT)
Dept: MEDICAL GROUP | Facility: PHYSICIAN GROUP | Age: 68
End: 2023-11-14
Payer: MEDICARE

## 2023-11-14 VITALS
TEMPERATURE: 97.1 F | OXYGEN SATURATION: 98 % | BODY MASS INDEX: 33.18 KG/M2 | RESPIRATION RATE: 16 BRPM | DIASTOLIC BLOOD PRESSURE: 82 MMHG | WEIGHT: 237 LBS | HEART RATE: 60 BPM | HEIGHT: 71 IN | SYSTOLIC BLOOD PRESSURE: 128 MMHG

## 2023-11-14 DIAGNOSIS — R35.0 BENIGN PROSTATIC HYPERPLASIA WITH URINARY FREQUENCY: Chronic | ICD-10-CM

## 2023-11-14 DIAGNOSIS — E78.5 DYSLIPIDEMIA: Chronic | ICD-10-CM

## 2023-11-14 DIAGNOSIS — E66.09 CLASS 1 OBESITY DUE TO EXCESS CALORIES WITHOUT SERIOUS COMORBIDITY WITH BODY MASS INDEX (BMI) OF 33.0 TO 33.9 IN ADULT: ICD-10-CM

## 2023-11-14 DIAGNOSIS — N40.1 BENIGN PROSTATIC HYPERPLASIA WITH URINARY FREQUENCY: Chronic | ICD-10-CM

## 2023-11-14 DIAGNOSIS — F33.1 MODERATE EPISODE OF RECURRENT MAJOR DEPRESSIVE DISORDER (HCC): ICD-10-CM

## 2023-11-14 DIAGNOSIS — Z98.890 S/P ABLATION OF ATRIAL FIBRILLATION: ICD-10-CM

## 2023-11-14 DIAGNOSIS — G47.33 OBSTRUCTIVE SLEEP APNEA SYNDROME: ICD-10-CM

## 2023-11-14 DIAGNOSIS — I48.0 PAROXYSMAL ATRIAL FIBRILLATION (HCC): ICD-10-CM

## 2023-11-14 DIAGNOSIS — R07.89 OTHER CHEST PAIN: ICD-10-CM

## 2023-11-14 DIAGNOSIS — I10 ESSENTIAL HYPERTENSION: ICD-10-CM

## 2023-11-14 DIAGNOSIS — Z86.79 S/P ABLATION OF ATRIAL FIBRILLATION: ICD-10-CM

## 2023-11-14 PROCEDURE — 3079F DIAST BP 80-89 MM HG: CPT

## 2023-11-14 PROCEDURE — 99214 OFFICE O/P EST MOD 30 MIN: CPT

## 2023-11-14 PROCEDURE — 93000 ELECTROCARDIOGRAM COMPLETE: CPT

## 2023-11-14 PROCEDURE — 3074F SYST BP LT 130 MM HG: CPT

## 2023-11-14 RX ORDER — ESCITALOPRAM OXALATE 20 MG/1
20 TABLET ORAL DAILY
Qty: 30 TABLET | Refills: 1 | Status: SHIPPED | OUTPATIENT
Start: 2023-11-14 | End: 2024-01-09 | Stop reason: SDUPTHER

## 2023-11-14 RX ORDER — ESCITALOPRAM OXALATE 10 MG/1
10 TABLET ORAL DAILY
Qty: 30 TABLET | Refills: 1 | Status: SHIPPED | OUTPATIENT
Start: 2023-11-14 | End: 2024-03-04

## 2023-11-14 ASSESSMENT — ENCOUNTER SYMPTOMS
HEADACHES: 0
CHILLS: 0
ABDOMINAL PAIN: 0
SHORTNESS OF BREATH: 0
DIZZINESS: 0
HEARTBURN: 0
WHEEZING: 0
FEVER: 0
BLOOD IN STOOL: 0

## 2023-11-14 ASSESSMENT — PATIENT HEALTH QUESTIONNAIRE - PHQ9
6. FEELING BAD ABOUT YOURSELF - OR THAT YOU ARE A FAILURE OR HAVE LET YOURSELF OR YOUR FAMILY DOWN: NOT AL ALL
2. FEELING DOWN, DEPRESSED, IRRITABLE, OR HOPELESS: NOT AT ALL
7. TROUBLE CONCENTRATING ON THINGS, SUCH AS READING THE NEWSPAPER OR WATCHING TELEVISION: NOT AT ALL
8. MOVING OR SPEAKING SO SLOWLY THAT OTHER PEOPLE COULD HAVE NOTICED. OR THE OPPOSITE, BEING SO FIGETY OR RESTLESS THAT YOU HAVE BEEN MOVING AROUND A LOT MORE THAN USUAL: NOT AT ALL
SUM OF ALL RESPONSES TO PHQ QUESTIONS 1-9: 0
5. POOR APPETITE OR OVEREATING: NOT AT ALL
9. THOUGHTS THAT YOU WOULD BE BETTER OFF DEAD, OR OF HURTING YOURSELF: NOT AT ALL
3. TROUBLE FALLING OR STAYING ASLEEP OR SLEEPING TOO MUCH: NOT AT ALL
SUM OF ALL RESPONSES TO PHQ9 QUESTIONS 1 AND 2: 0
1. LITTLE INTEREST OR PLEASURE IN DOING THINGS: NOT AT ALL
4. FEELING TIRED OR HAVING LITTLE ENERGY: NOT AT ALL

## 2023-11-14 ASSESSMENT — FIBROSIS 4 INDEX: FIB4 SCORE: 1.11

## 2023-11-14 NOTE — PROGRESS NOTES
Subjective:     Chief Complaint   Patient presents with    Establish Care    Chest Pain     X 4 days     Medication Refill     Escitalopram      Dean Nobles is a 68 y.o. male, who is here today to establish care and discuss chest pain for the last 4 days. His prior PCP was Kenia Sams MD.     Problem   Other Chest Pain    This is a new problem.  Reports that over the last 4 days he has had a tingling sensation in the center of his chest.  Denies any radiation to his arms.  Patient reports these are occurring at rest and only last a few minutes and are random.  Denies having any of the symptoms today.    Denies any shortness of breath, wheezing, feeling rundown or increase of fatigue.   Patient unsure if this is a real sensation or if he is being hyperaware due to the last year and in the events that have occurred due to his atrial fibrillation.  EKG Interpretation   Ordered and interpreted by DIANA Salgado  Rhythm: normal sinus   Rate: normal   Axis: normal   Ectopy: none   Conduction: normal   ST Segments: no acute change   T Waves: no acute change   Q Waves: none   Clinical Impression: no acute changes and normal EKG     S/P Ablation of Atrial Fibrillation    Patient had a successful ablation in June 2023.  Patient currently on carvedilol 6.25 mg twice daily and Eliquis 5 mg twice daily.  Denies any blood in his urine or stool.  Last appointment with Dr. Naga Davalos was in September 2023.  Per Dr. Davalos's last note he is to follow-up in March 2024 with him.     Secondary Hypercoagulable State (Hcc)    Managed by cardiology, sees Dr. Oliverio Devlin.  Patient on Eliquis 5 mg twice daily, denies any blood in his urine or stool.     Obstructive Sleep Apnea Syndrome    Managed by pulmonology, Dr. Garcia Carrizales.   Patient was started on a CPAP machine in July 2023.  Reports he is tolerating this well.  Patient is to follow-up with pulmonology in 1 year approximately around 9/2024.     Paroxysmal  Atrial Fibrillation (Hcc)    Managed by cardiology, Dr. Naag Davalos.  Patient on Eliquis and carvedilol, tolerating well.     Pro's Esophagus    Chronic, stable  Last seen by GI in February 2023.     Class 1 Obesity Due to Excess Calories Without Serious Comorbidity With Body Mass Index (Bmi) of 33.0 to 33.9 in Adult    Patient reports that his diet is fairly balanced.  He is still drinking alcohol, approximately half a bottle (750 ml) of vodka a day.      Essential Hypertension    Chronic, stable.  Managed by cardiology, patient reports he was only on 4 medications prior to this year.  Patient is taking benazepril, carvedilol, and spironolactone daily.  Patient reports having a blood pressure cuff at home and watches this as needed.  Denies any shortness of breath, wheezing, headaches, dizziness/lightheadedness.  Pressure in office today is 128/82.     Dyslipidemia    Chronic, stable  Patient is taking atorvastatin 10 mg daily, tolerating well.  Denies any side effects.  Today with a tingling sensation in his chest and is unable to pinpoint if it was chest pain or just being hypersensitive to his recent events of this year surrounding his atrial fibrillation and ablation.  Lab Results   Component Value Date/Time    CHOLSTRLTOT 177 12/16/2022 0922    TRIGLYCERIDE 64 12/16/2022 0922    HDL 73 12/16/2022 0922    LDL 91 12/16/2022 0922        Moderate Episode of Recurrent Major Depressive Disorder (Hcc)    Chronic, stable  Patient on escitalopram 30 mg total daily.  Patient is on a regimen where he takes a 20 tablet in the morning and a 10 mg tablet at night.  Patient reports this is working well for him and denies any side effects at this time.        Allergies: Patient has no known allergies.    Current Outpatient Medications   Medication Instructions    atorvastatin (LIPITOR) 10 mg, Oral, DAILY    benazepril (LOTENSIN) 40 mg, Oral, DAILY    carvedilol (COREG) 6.25 mg, Oral, 2 TIMES DAILY WITH MEALS    Eliquis 5  "mg, Oral, 2 TIMES DAILY    escitalopram (LEXAPRO) 10 mg, Oral, DAILY    escitalopram (LEXAPRO) 20 mg, Oral, DAILY    latanoprost (XALATAN) 0.005 % Solution 1 Drop, Both Eyes, DAILY    omeprazole (PRILOSEC) 20 mg, Oral, DAILY    spironolactone (ALDACTONE) 50 mg, Oral, DAILY    tadalafil (CIALIS) 10 mg, Oral, PRN    timolol (TIMOPTIC) 0.5 % Solution 1 Drop, Both Eyes, DAILY      Review of Systems   Constitutional:  Negative for chills and fever.   Respiratory:  Negative for shortness of breath and wheezing.    Cardiovascular:  Negative for chest pain and leg swelling.   Gastrointestinal:  Negative for abdominal pain, blood in stool and heartburn.   Genitourinary:  Negative for hematuria.   Skin:  Negative for rash.   Neurological:  Negative for dizziness and headaches.     Health Maintenance: Completed    Objective:     /82   Pulse 60   Temp 36.2 °C (97.1 °F) (Temporal)   Resp 16   Ht 1.803 m (5' 11\")   Wt 108 kg (237 lb)   SpO2 98%  Body mass index is 33.05 kg/m².    Physical Exam  Vitals reviewed.   Constitutional:       Appearance: Normal appearance.   Cardiovascular:      Rate and Rhythm: Normal rate and regular rhythm.      Pulses: Normal pulses.      Heart sounds: Normal heart sounds.   Pulmonary:      Effort: Pulmonary effort is normal.      Breath sounds: Normal breath sounds.   Abdominal:      General: Abdomen is flat.      Palpations: Abdomen is soft.   Musculoskeletal:         General: Normal range of motion.      Cervical back: Normal range of motion.   Skin:     General: Skin is warm and dry.   Neurological:      General: No focal deficit present.      Mental Status: He is alert.   Psychiatric:         Mood and Affect: Mood normal.         Behavior: Behavior normal.        Assessment & Plan:     The following plan was discussed through shared decision making with the patient:    1. Other chest pain  Acute problem.  The patient's cardiac history and symptoms presenting at rest.  Performed EKG " in office today, demonstrated sinus rhythm. Advised patient to seek emergency evaluation for symptoms including but not limited to : crushing chest pain, chest pain associated with difficulty breathing, nausea, sweats, heart rate irregular or too fast to count.   Instructed patient to make his follow-up appointment with cardiology as directed per Dr. Naga Davalos.  - EKG - Clinic Performed    2. Moderate episode of recurrent major depressive disorder (HCC)  Chronic, in partial remission.   The patient denies any symptoms of depression or SI/HI today.  Therefore, we will continue the current regimen.  Patient requesting refills today.  - escitalopram (LEXAPRO) 20 MG tablet; Take 1 Tablet by mouth every day for 60 days.  Dispense: 30 Tablet; Refill: 1  - escitalopram (LEXAPRO) 10 MG Tab; Take 1 Tablet by mouth every day for 60 days.  Dispense: 30 Tablet; Refill: 1    3. Dyslipidemia  Chronic, controlled.   The patient is on a statin for primary prevention and tolerating it well. The last cholesterol panel in 12/2022 was all within normal limits so we will continue the current dosing.  Due for updated labs  - Lipid Profile; Future    4. Essential hypertension  Chronic, controlled.  Managed by cardiology.  Blood pressure is at goal under 140/90 in the office today.    Patient advised to continue monitoring blood pressure at home. Reviewed use of arm cuff, and to obtain reading after five minutes of rest with both feet flat on the floor. No symptoms of new headaches, chest pain or shortness of breath. Denies lightheadedness or dizziness. Reviewed dietary and lifestyle management including <2000mg of sodium per day, limit alcohol consumption, and regular aerobic exercise to total 150 minutes per week.   - Comp Metabolic Panel; Future  - TSH WITH REFLEX TO FT4; Future    5. Obstructive sleep apnea syndrome  This is being managed by pulmonology and is to follow-up approximately September 2024 with Dr. Garcia Carrizales.   Patient is due for updated labs.  - CBC WITH DIFFERENTIAL; Future    6. S/P ablation of atrial fibrillation  7. Paroxysmal atrial fibrillation (HCC)  This is being managed by cardiology.  Patient is to follow-up with Dr. Naga Davalos in 4 to 6 months.  Due for updated labs  - Comp Metabolic Panel; Future  - TSH WITH REFLEX TO FT4; Future    8. Class 1 obesity due to excess calories without serious comorbidity with body mass index (BMI) of 33.0 to 33.9 in adult  Recommendations for healthy lifestyle include:  - Good rule of thumb for portions = size of the palm of your hand  - Eliminate or decrease the amount of all white carbohydrates such as white rice, white bread, white pasta, potatoes, etc.    - Focus on whole foods that are less processed foods and more of plant/animal proteins; great examples are lean poultry or fish, vegetables, nuts, beans, and berries.   - Aim for at least 15-20 grams of fiber per day.    - Avoid drinking sugary beverages such as juice, soda, specialty coffee; these have very little nutritional value and are high in calories.   - Exercise: 150 minutes of moderate aerobic activity per week OR 75 minutes of vigorous aerobic activity per week + 2 days per week of strength  - Lipid Profile; Future    9. Benign prostatic hyperplasia with urinary frequency  Patient has a history of having abnormal PSA.  Patient has seen [facility] in the past.  Due for updated labs.  - PROSTATE SPECIFIC AG DIAGNOSTIC; Future    Return in about 3 months (around 2/14/2024) for Medicare Wellness.         Please note that this dictation was created using voice recognition software. I have made every reasonable attempt to correct obvious errors, but I expect that there are errors of grammar and possibly content that I did not discover before finalizing the note.    DIANA Samuel  [facility]  [facility]

## 2024-01-09 DIAGNOSIS — F33.1 MODERATE EPISODE OF RECURRENT MAJOR DEPRESSIVE DISORDER (HCC): ICD-10-CM

## 2024-01-09 RX ORDER — ESCITALOPRAM OXALATE 20 MG/1
20 TABLET ORAL DAILY
Qty: 30 TABLET | Refills: 1 | Status: SHIPPED | OUTPATIENT
Start: 2024-01-09 | End: 2024-03-09

## 2024-01-17 ENCOUNTER — APPOINTMENT (OUTPATIENT)
Dept: URGENT CARE | Facility: PHYSICIAN GROUP | Age: 69
End: 2024-01-17
Payer: MEDICARE

## 2024-01-23 ENCOUNTER — HOSPITAL ENCOUNTER (OUTPATIENT)
Dept: LAB | Facility: MEDICAL CENTER | Age: 69
End: 2024-01-23
Payer: MEDICARE

## 2024-01-23 DIAGNOSIS — N40.1 BENIGN PROSTATIC HYPERPLASIA WITH URINARY FREQUENCY: Chronic | ICD-10-CM

## 2024-01-23 DIAGNOSIS — E78.5 DYSLIPIDEMIA: Chronic | ICD-10-CM

## 2024-01-23 DIAGNOSIS — R35.0 BENIGN PROSTATIC HYPERPLASIA WITH URINARY FREQUENCY: Chronic | ICD-10-CM

## 2024-01-23 DIAGNOSIS — E66.09 CLASS 1 OBESITY DUE TO EXCESS CALORIES WITHOUT SERIOUS COMORBIDITY WITH BODY MASS INDEX (BMI) OF 33.0 TO 33.9 IN ADULT: ICD-10-CM

## 2024-01-23 DIAGNOSIS — G47.33 OBSTRUCTIVE SLEEP APNEA SYNDROME: ICD-10-CM

## 2024-01-23 DIAGNOSIS — I10 ESSENTIAL HYPERTENSION: ICD-10-CM

## 2024-01-23 DIAGNOSIS — I48.0 PAROXYSMAL ATRIAL FIBRILLATION (HCC): ICD-10-CM

## 2024-01-23 LAB
ALBUMIN SERPL BCP-MCNC: 4.4 G/DL (ref 3.2–4.9)
ALBUMIN/GLOB SERPL: 1.8 G/DL
ALP SERPL-CCNC: 51 U/L (ref 30–99)
ALT SERPL-CCNC: 35 U/L (ref 2–50)
ANION GAP SERPL CALC-SCNC: 9 MMOL/L (ref 7–16)
AST SERPL-CCNC: 24 U/L (ref 12–45)
BASOPHILS # BLD AUTO: 0.7 % (ref 0–1.8)
BASOPHILS # BLD: 0.04 K/UL (ref 0–0.12)
BILIRUB SERPL-MCNC: 0.4 MG/DL (ref 0.1–1.5)
BUN SERPL-MCNC: 18 MG/DL (ref 8–22)
CALCIUM ALBUM COR SERPL-MCNC: 8.6 MG/DL (ref 8.5–10.5)
CALCIUM SERPL-MCNC: 8.9 MG/DL (ref 8.5–10.5)
CHLORIDE SERPL-SCNC: 95 MMOL/L (ref 96–112)
CHOLEST SERPL-MCNC: 188 MG/DL (ref 100–199)
CO2 SERPL-SCNC: 26 MMOL/L (ref 20–33)
CREAT SERPL-MCNC: 0.83 MG/DL (ref 0.5–1.4)
EOSINOPHIL # BLD AUTO: 0.13 K/UL (ref 0–0.51)
EOSINOPHIL NFR BLD: 2.3 % (ref 0–6.9)
ERYTHROCYTE [DISTWIDTH] IN BLOOD BY AUTOMATED COUNT: 43.7 FL (ref 35.9–50)
FASTING STATUS PATIENT QL REPORTED: NORMAL
GFR SERPLBLD CREATININE-BSD FMLA CKD-EPI: 94 ML/MIN/1.73 M 2
GLOBULIN SER CALC-MCNC: 2.5 G/DL (ref 1.9–3.5)
GLUCOSE SERPL-MCNC: 101 MG/DL (ref 65–99)
HCT VFR BLD AUTO: 41.8 % (ref 42–52)
HDLC SERPL-MCNC: 76 MG/DL
HGB BLD-MCNC: 14 G/DL (ref 14–18)
IMM GRANULOCYTES # BLD AUTO: 0.06 K/UL (ref 0–0.11)
IMM GRANULOCYTES NFR BLD AUTO: 1.1 % (ref 0–0.9)
LDLC SERPL CALC-MCNC: 83 MG/DL
LYMPHOCYTES # BLD AUTO: 1.16 K/UL (ref 1–4.8)
LYMPHOCYTES NFR BLD: 20.7 % (ref 22–41)
MCH RBC QN AUTO: 32.6 PG (ref 27–33)
MCHC RBC AUTO-ENTMCNC: 33.5 G/DL (ref 32.3–36.5)
MCV RBC AUTO: 97.2 FL (ref 81.4–97.8)
MONOCYTES # BLD AUTO: 0.55 K/UL (ref 0–0.85)
MONOCYTES NFR BLD AUTO: 9.8 % (ref 0–13.4)
NEUTROPHILS # BLD AUTO: 3.66 K/UL (ref 1.82–7.42)
NEUTROPHILS NFR BLD: 65.4 % (ref 44–72)
NRBC # BLD AUTO: 0 K/UL
NRBC BLD-RTO: 0 /100 WBC (ref 0–0.2)
PLATELET # BLD AUTO: 281 K/UL (ref 164–446)
PMV BLD AUTO: 10.6 FL (ref 9–12.9)
POTASSIUM SERPL-SCNC: 5 MMOL/L (ref 3.6–5.5)
PROT SERPL-MCNC: 6.9 G/DL (ref 6–8.2)
RBC # BLD AUTO: 4.3 M/UL (ref 4.7–6.1)
SODIUM SERPL-SCNC: 130 MMOL/L (ref 135–145)
TRIGL SERPL-MCNC: 143 MG/DL (ref 0–149)
WBC # BLD AUTO: 5.6 K/UL (ref 4.8–10.8)

## 2024-01-23 PROCEDURE — 84443 ASSAY THYROID STIM HORMONE: CPT

## 2024-01-23 PROCEDURE — 84153 ASSAY OF PSA TOTAL: CPT

## 2024-01-23 PROCEDURE — 80053 COMPREHEN METABOLIC PANEL: CPT

## 2024-01-23 PROCEDURE — 85025 COMPLETE CBC W/AUTO DIFF WBC: CPT

## 2024-01-23 PROCEDURE — 36415 COLL VENOUS BLD VENIPUNCTURE: CPT

## 2024-01-23 PROCEDURE — 80061 LIPID PANEL: CPT

## 2024-01-24 LAB
PSA SERPL-MCNC: 1.45 NG/ML (ref 0–4)
TSH SERPL DL<=0.005 MIU/L-ACNC: 1.05 UIU/ML (ref 0.38–5.33)

## 2024-01-31 ENCOUNTER — APPOINTMENT (RX ONLY)
Dept: URBAN - METROPOLITAN AREA CLINIC 6 | Facility: CLINIC | Age: 69
Setting detail: DERMATOLOGY
End: 2024-01-31

## 2024-01-31 DIAGNOSIS — D22 MELANOCYTIC NEVI: ICD-10-CM

## 2024-01-31 DIAGNOSIS — Z71.89 OTHER SPECIFIED COUNSELING: ICD-10-CM

## 2024-01-31 DIAGNOSIS — L82.1 OTHER SEBORRHEIC KERATOSIS: ICD-10-CM

## 2024-01-31 DIAGNOSIS — L81.4 OTHER MELANIN HYPERPIGMENTATION: ICD-10-CM

## 2024-01-31 DIAGNOSIS — D18.0 HEMANGIOMA: ICD-10-CM

## 2024-01-31 DIAGNOSIS — L57.0 ACTINIC KERATOSIS: ICD-10-CM

## 2024-01-31 PROBLEM — D18.01 HEMANGIOMA OF SKIN AND SUBCUTANEOUS TISSUE: Status: ACTIVE | Noted: 2024-01-31

## 2024-01-31 PROBLEM — D48.5 NEOPLASM OF UNCERTAIN BEHAVIOR OF SKIN: Status: ACTIVE | Noted: 2024-01-31

## 2024-01-31 PROBLEM — D22.71 MELANOCYTIC NEVI OF RIGHT LOWER LIMB, INCLUDING HIP: Status: ACTIVE | Noted: 2024-01-31

## 2024-01-31 PROBLEM — D22.5 MELANOCYTIC NEVI OF TRUNK: Status: ACTIVE | Noted: 2024-01-31

## 2024-01-31 PROBLEM — D22.62 MELANOCYTIC NEVI OF LEFT UPPER LIMB, INCLUDING SHOULDER: Status: ACTIVE | Noted: 2024-01-31

## 2024-01-31 PROBLEM — D22.72 MELANOCYTIC NEVI OF LEFT LOWER LIMB, INCLUDING HIP: Status: ACTIVE | Noted: 2024-01-31

## 2024-01-31 PROBLEM — D22.61 MELANOCYTIC NEVI OF RIGHT UPPER LIMB, INCLUDING SHOULDER: Status: ACTIVE | Noted: 2024-01-31

## 2024-01-31 PROCEDURE — 11102 TANGNTL BX SKIN SINGLE LES: CPT

## 2024-01-31 PROCEDURE — ? BIOPSY BY SHAVE METHOD

## 2024-01-31 PROCEDURE — 17003 DESTRUCT PREMALG LES 2-14: CPT

## 2024-01-31 PROCEDURE — 17000 DESTRUCT PREMALG LESION: CPT | Mod: 59

## 2024-01-31 PROCEDURE — ? LIQUID NITROGEN

## 2024-01-31 PROCEDURE — ? COUNSELING

## 2024-01-31 PROCEDURE — 99203 OFFICE O/P NEW LOW 30 MIN: CPT | Mod: 25

## 2024-01-31 ASSESSMENT — LOCATION SIMPLE DESCRIPTION DERM
LOCATION SIMPLE: LEFT CALF
LOCATION SIMPLE: RIGHT CALF
LOCATION SIMPLE: LEFT UPPER BACK
LOCATION SIMPLE: RIGHT EAR
LOCATION SIMPLE: CHEST
LOCATION SIMPLE: RIGHT UPPER BACK
LOCATION SIMPLE: RIGHT FOREARM
LOCATION SIMPLE: LEFT UPPER ARM
LOCATION SIMPLE: RIGHT THIGH
LOCATION SIMPLE: LEFT CHEEK
LOCATION SIMPLE: RIGHT UPPER ARM
LOCATION SIMPLE: LEFT FOREARM
LOCATION SIMPLE: LEFT POSTERIOR THIGH
LOCATION SIMPLE: LEFT THIGH
LOCATION SIMPLE: RIGHT LOWER BACK
LOCATION SIMPLE: RIGHT POSTERIOR THIGH

## 2024-01-31 ASSESSMENT — LOCATION DETAILED DESCRIPTION DERM
LOCATION DETAILED: LEFT VENTRAL DISTAL FOREARM
LOCATION DETAILED: LEFT PROXIMAL CALF
LOCATION DETAILED: RIGHT MEDIAL INFERIOR CHEST
LOCATION DETAILED: LEFT INFERIOR UPPER BACK
LOCATION DETAILED: RIGHT VENTRAL DISTAL FOREARM
LOCATION DETAILED: LEFT ANTERIOR DISTAL UPPER ARM
LOCATION DETAILED: LEFT DISTAL POSTERIOR THIGH
LOCATION DETAILED: RIGHT MEDIAL SUPERIOR CHEST
LOCATION DETAILED: RIGHT PROXIMAL DORSAL FOREARM
LOCATION DETAILED: RIGHT DISTAL POSTERIOR THIGH
LOCATION DETAILED: RIGHT PROXIMAL CALF
LOCATION DETAILED: RIGHT SUPERIOR HELIX
LOCATION DETAILED: RIGHT VENTRAL PROXIMAL FOREARM
LOCATION DETAILED: RIGHT ANTERIOR PROXIMAL THIGH
LOCATION DETAILED: LEFT ANTERIOR PROXIMAL THIGH
LOCATION DETAILED: LEFT PROXIMAL DORSAL FOREARM
LOCATION DETAILED: RIGHT MID-UPPER BACK
LOCATION DETAILED: RIGHT SUPERIOR LATERAL MIDBACK
LOCATION DETAILED: LEFT CENTRAL MALAR CHEEK
LOCATION DETAILED: RIGHT ANTERIOR DISTAL UPPER ARM

## 2024-01-31 ASSESSMENT — LOCATION ZONE DERM
LOCATION ZONE: ARM
LOCATION ZONE: EAR
LOCATION ZONE: LEG
LOCATION ZONE: TRUNK
LOCATION ZONE: FACE

## 2024-02-16 ENCOUNTER — OFFICE VISIT (OUTPATIENT)
Dept: MEDICAL GROUP | Facility: PHYSICIAN GROUP | Age: 69
End: 2024-02-16
Payer: MEDICARE

## 2024-02-16 VITALS
HEART RATE: 67 BPM | BODY MASS INDEX: 34.65 KG/M2 | TEMPERATURE: 97.6 F | RESPIRATION RATE: 18 BRPM | WEIGHT: 247.5 LBS | DIASTOLIC BLOOD PRESSURE: 70 MMHG | SYSTOLIC BLOOD PRESSURE: 114 MMHG | OXYGEN SATURATION: 95 % | HEIGHT: 71 IN

## 2024-02-16 DIAGNOSIS — Z00.00 MEDICARE ANNUAL WELLNESS VISIT, SUBSEQUENT: Primary | ICD-10-CM

## 2024-02-16 PROCEDURE — 3074F SYST BP LT 130 MM HG: CPT

## 2024-02-16 PROCEDURE — G0439 PPPS, SUBSEQ VISIT: HCPCS

## 2024-02-16 PROCEDURE — 3078F DIAST BP <80 MM HG: CPT

## 2024-02-16 ASSESSMENT — ENCOUNTER SYMPTOMS: GENERAL WELL-BEING: FAIR

## 2024-02-16 ASSESSMENT — FIBROSIS 4 INDEX: FIB4 SCORE: 1.01

## 2024-02-16 ASSESSMENT — ACTIVITIES OF DAILY LIVING (ADL): BATHING_REQUIRES_ASSISTANCE: 0

## 2024-02-16 ASSESSMENT — PATIENT HEALTH QUESTIONNAIRE - PHQ9
CLINICAL INTERPRETATION OF PHQ2 SCORE: 2
5. POOR APPETITE OR OVEREATING: 0 - NOT AT ALL

## 2024-02-16 NOTE — PROGRESS NOTES
Chief Complaint   Patient presents with    Annual Wellness Visit     HPI:  Dean Nobles is a 68 y.o. here for Medicare Annual Wellness Visit     Patient Active Problem List    Diagnosis Date Noted    Other chest pain 11/14/2023    S/P ablation of atrial fibrillation 09/13/2023    Alcohol use 09/13/2023    Anticoagulated 04/24/2023    Secondary hypercoagulable state (HCC) 03/22/2023    Obstructive sleep apnea syndrome 03/22/2023    Leg swelling 03/22/2023    Paroxysmal atrial fibrillation (HCC) 02/15/2023    Other specified glaucoma 10/26/2022    Blood in stool 12/07/2021    Rash 02/09/2021    Pro's esophagus 10/16/2020    Class 1 obesity due to excess calories without serious comorbidity with body mass index (BMI) of 33.0 to 33.9 in adult 01/30/2017    Essential hypertension 01/05/2012    Dyslipidemia 01/05/2012    Moderate episode of recurrent major depressive disorder (HCC) 01/05/2012    BPH (benign prostatic hyperplasia) 01/05/2012    ADD (attention deficit disorder) 01/05/2012    ED (erectile dysfunction) 01/05/2012       Current Outpatient Medications   Medication Sig Dispense Refill    escitalopram (LEXAPRO) 20 MG tablet Take 1 Tablet by mouth every day for 60 days. 30 Tablet 1    tadalafil (CIALIS) 10 MG tablet Take 1 Tablet by mouth as needed for Erectile Dysfunction. 10 Tablet 3    carvedilol (COREG) 6.25 MG Tab Take 1 Tablet by mouth 2 times a day with meals. 60 Tablet 11    spironolactone (ALDACTONE) 50 MG Tab Take 1 Tablet by mouth every day. 90 Tablet 3    benazepril (LOTENSIN) 40 MG tablet Take 1 Tablet by mouth every day. 100 Tablet 3    ELIQUIS 5 MG Tab Take 5 mg by mouth 2 times a day.      atorvastatin (LIPITOR) 10 MG Tab Take 1 Tablet by mouth every day. 90 Tablet 3    latanoprost (XALATAN) 0.005 % Solution Administer 1 Drop into both eyes every day.      timolol (TIMOPTIC) 0.5 % Solution Administer 1 Drop into both eyes every day.      omeprazole (PRILOSEC) 20 MG delayed-release  capsule Take 1 Cap by mouth every day. 90 Cap 3     No current facility-administered medications for this visit.          Current supplements as per medication list.     Allergies: Patient has no known allergies.    Current social contact/activities: NONE     He  reports that he quit smoking about 40 years ago. His smoking use included cigarettes. He started smoking about 55 years ago. He has a 30 pack-year smoking history. He has never used smokeless tobacco. He reports current alcohol use of about 12.6 oz of alcohol per week. He reports that he does not use drugs.  Counseling given: Not Answered  Tobacco comments: 16-30      ROS:    Gait: Uses no assistive device  Ostomy: No  Other tubes: No  Amputations: No  Chronic oxygen use: Yes CPAP, every night  Last eye exam: 05/2023  Wears hearing aids: No   : Denies any urinary leakage during the last 6 months    Screening:    Depression Screening  Little interest or pleasure in doing things?  0 - not at all  Feeling down, depressed , or hopeless? 2 - more than half the days  Trouble falling or staying asleep, or sleeping too much?  0 - not at all  Feeling tired or having little energy?  1 - several days  Poor appetite or overeating?  0 - not at all  Feeling bad about yourself - or that you are a failure or have let yourself or your family down? 0 - not at all  Trouble concentrating on things, such as reading the newspaper or watching television? 0 - not at all  Moving or speaking so slowly that other people could have noticed.  Or the opposite - being so fidgety or restless that you have been moving around a lot more than usual?  0 - not at all  Thoughts that you would be better off dead, or of hurting yourself?  0 - not at all  Patient Health Questionnaire Score:      If depressive symptoms identified deferred to follow up visit unless specifically addressed in assessment and plan.    Interpretation of PHQ-9 Total Score   Score Severity   1-4 No Depression   5-9 Mild  Depression   10-14 Moderate Depression   15-19 Moderately Severe Depression   20-27 Severe Depression    Screening for Cognitive Impairment  Do you or any of your friends or family members have any concern about your memory? No  Three Minute Recall (Banana, Sunrise, Chair) 3/3    Sawyer clock face with all 12 numbers and set the hands to show 20 past 8.  Yes    Cognitive concerns identified deferred for follow up unless specifically addressed in assessment and plan.    Fall Risk Assessment  Has the patient had two or more falls in the last year or any fall with injury in the last year?  Yes    Safety Assessment  Do you always wear your seatbelt?  Yes  Any changes to home needed to function safely? No  Difficulty hearing.  No  Patient counseled about all safety risks that were identified.    Functional Assessment ADLs  Are there any barriers preventing you from cooking for yourself or meeting nutritional needs?  No.    Are there any barriers preventing you from driving safely or obtaining transportation?  No.    Are there any barriers preventing you from using a telephone or calling for help?  No    Are there any barriers preventing you from shopping?  No.    Are there any barriers preventing you from taking care of your own finances?  No    Are there any barriers preventing you from managing your medications?  No    Are there any barriers preventing you from showering, bathing or dressing yourself? No    Are there any barriers preventing you from doing housework or laundry? No    Are there any barriers preventing you from using the toilet?No    Are you currently engaging in any exercise or physical activity?  No.      Self-Assessment of Health  What is your perception of your health? Fair    Do you sleep more than six hours a night? Yes    In the past 7 days, how much did pain keep you from doing your normal work? None    Do you spend quality time with family or friends (virtually or in person)? Yes    Do you usually  eat a heart healthy diet that constists of a variety of fruits, vegetables, whole grains and fiber? Yes    Do you eat foods high in fat and/or Fast Food more than three times per week? No    How concerned are you that your medical conditions are not being well managed? a little    Are you worried that in the next 2 months, you may not have stable housing that you own, rent, or stay in as part of a household? No      Advance Care Planning  Do you have an Advance Directive, Living Will, Durable Power of , or POLST? Yes  Advance Directive Living Will   POLST is on file    Health Maintenance Summary            Annual Wellness Visit (Yearly) Next due on 2/16/2025 02/16/2024  Level of Service: ANNUAL WELLNESS VISIT-INCLUDES PPPS SUBSEQUE*    02/16/2024  Visit Dx: Medicare annual wellness visit, subsequent    10/26/2022  Visit Dx: Encounter for Medicare annual wellness exam    10/29/2019  Subsequent Annual Wellness Visit - Includes PPPS ()    10/29/2019  Visit Dx: Medicare annual wellness visit, subsequent    Only the first 5 history entries have been loaded, but more history exists.              Colorectal Cancer Screening (Colonoscopy - Every 10 Years) Tentatively due on 12/14/2028 12/14/2018  REFERRAL TO GI FOR COLONOSCOPY              IMM DTaP/Tdap/Td Vaccine (3 - Td or Tdap) Next due on 2/15/2033      02/15/2023  Imm Admin: Tdap Vaccine    01/21/2013  Imm Admin: Tdap Vaccine              Abdominal Aortic Aneurysm (AAA) Screening  Tentatively Complete      10/22/2019  US-ABDOMEN COMPLETE SURVEY              Hepatitis C Screening  Tentatively Complete      10/22/2019  Hepatitis C Antibody component of HEP C VIRUS ANTIBODY              Pneumococcal Vaccine: 65+ Years (Series Information) Completed      11/05/2019  Imm Admin: Pneumococcal polysaccharide vaccine (PPSV-23)    10/22/2018  Imm Admin: Pneumococcal Conjugate Vaccine (Prevnar/PCV-13)              Zoster (Shingles) Vaccines (Series  Information) Completed      07/22/2023  Imm Admin: Zoster Vaccine Recombinant (RZV) (SHINGRIX)    04/25/2023  Imm Admin: Zoster Vaccine Recombinant (RZV) (SHINGRIX)              Influenza Vaccine (Series Information) Completed      09/22/2023  Imm Admin: Influenza Vaccine, Quadrivalent, Adjuvanted (Pf)    10/11/2022  Imm Admin: Influenza, Unspecified - HISTORICAL DATA    10/12/2021  Imm Admin: Influenza, Unspecified - HISTORICAL DATA    10/15/2020  Imm Admin: Influenza Vaccine Adult HD    10/10/2019  Imm Admin: Influenza Vaccine Adult HD    Only the first 5 history entries have been loaded, but more history exists.              COVID-19 Vaccine (Series Information) Completed      10/18/2023  Imm Admin: Comirnaty (Covid-19 Vaccine, Mrna, [id number] Formula)    10/04/2022  Imm Admin: MODERNA BIVALENT BOOSTER SARS-COV-2 VACCINE (6+)    06/01/2022  Imm Admin: MODERNA SARS-COV-2 VACCINE (12+)    10/26/2021  Imm Admin: PFIZER PURPLE CAP SARS-COV-2 VACCINATION (12+)    03/11/2021  Imm Admin: Ly SARS-CoV-2 Vaccine    Only the first 5 history entries have been loaded, but more history exists.              Hepatitis A Vaccine (Hep A) (Series Information) Aged Out      No completion history exists for this topic.              Hepatitis B Vaccine (Hep B) (Series Information) Aged Out      No completion history exists for this topic.              HPV Vaccines (Series Information) Aged Out      No completion history exists for this topic.              Polio Vaccine (Inactivated Polio) (Series Information) Aged Out      No completion history exists for this topic.              Meningococcal Immunization (Series Information) Aged Out      No completion history exists for this topic.                  Patient Care Team:  ROBBY Samuel as PCP - General (Nurse Practitioner Family)  Cori Devlin M.D. as Cardiologist (Cardiovascular Disease (Cardiology))  [facility] (DME Supplier)    Social History  "    Tobacco Use    Smoking status: Former     Current packs/day: 0.00     Average packs/day: 2.0 packs/day for 15.0 years (30.0 ttl pk-yrs)     Types: Cigarettes     Start date:      Quit date:      Years since quittin.1    Smokeless tobacco: Never    Tobacco comments:     16-   Vaping Use    Vaping Use: Never used   Substance Use Topics    Alcohol use: Yes     Alcohol/week: 12.6 oz     Types: 21 Glasses of wine per week     Comment: daily vodka - 4-5 cocktailes or wine - 3 glasses    Drug use: No     Family History   Problem Relation Age of Onset    Hyperlipidemia Mother     Heart Disease Mother          of heart attack at age of 80    Diabetes Mother         Type 2    Cancer Father         skin    Other Father         liver cirrhosis    Diabetes Sister     Hypertension Sister     Kidney Disease Sister     Diabetes Sister     Hypertension Sister     Other Sister         Kidney failure    Hyperlipidemia Brother     Heart Disease Brother         arrhysmia    Atrial fibrillation Brother     Sleep Apnea Brother     Ovarian Cancer Neg Hx     Tubal Cancer Neg Hx     Peritoneal Cancer Neg Hx     Colorectal Cancer Neg Hx     Breast Cancer Neg Hx     Stroke Neg Hx      He  has a past medical history of ADD (attention deficit disorder) (2012), Arrhythmia, Atrial fibrillation (HCC), BPH (benign prostatic hyperplasia) (2012), Chickenpox, Depression, Dyslipidemia (2012), ED (erectile dysfunction) (2012), Frequent urination, Setswana measles, Heartburn, HTN (hypertension), benign (2012), Hypertension, Influenza, Mumps, Rheumatic fever, Shortness of breath, Sleep apnea (), Swelling of lower extremity, Vision loss, and Wears glasses.   Past Surgical History:   Procedure Laterality Date    EYE SURGERY      [facility]     LAMINOTOMY         Exam:   /70   Pulse 67   Temp 36.4 °C (97.6 °F) (Temporal)   Resp 18   Ht 1.803 m (5' 11\")   Wt 112 kg " (247 lb 8 oz)   SpO2 95%  Body mass index is 34.52 kg/m².    Hearing good.    Dentition good  Alert, oriented in no acute distress.  Eye contact is good, speech goal directed, affect calm    Assessment and Plan. The following treatment and monitoring plan is recommended:    1. Medicare annual wellness visit, subsequent      Services suggested: No services needed at this time  Health Care Screening: Age-appropriate preventive services recommended by USPTF and ACIP covered by Medicare were discussed today. Services ordered if indicated and agreed upon by the patient.  Referrals offered: Community-based lifestyle interventions to reduce health risks and promote self-management and wellness, fall prevention, nutrition, physical activity, tobacco-use cessation, weight loss, and mental health services as per orders if indicated.    Discussion today about general wellness and lifestyle habits:    Prevent falls and reduce trip hazards; Cautioned about securing or removing rugs.  Have a working fire alarm and carbon monoxide detector;   Engage in regular physical activity and social activities     Follow-up: Return in about 6 months (around 8/16/2024) for Annual, Depression/PHQ9.

## 2024-02-27 RX ORDER — APIXABAN 5 MG/1
5 TABLET, FILM COATED ORAL 2 TIMES DAILY
Qty: 60 TABLET | OUTPATIENT
Start: 2024-02-27

## 2024-02-27 NOTE — TELEPHONE ENCOUNTER
Received request via: Pharmacy    Was the patient seen in the last year in this department? Yes    Does the patient have an active prescription (recently filled or refills available) for medication(s) requested? No    Pharmacy Name: [facility]    Does the patient have FDC Plus and need 100 day supply (blood pressure, diabetes and cholesterol meds only)? Patient does not have SCP

## 2024-02-28 ENCOUNTER — PATIENT MESSAGE (OUTPATIENT)
Dept: CARDIOLOGY | Facility: MEDICAL CENTER | Age: 69
End: 2024-02-28
Payer: MEDICARE

## 2024-02-28 DIAGNOSIS — I48.0 PAROXYSMAL ATRIAL FIBRILLATION (HCC): ICD-10-CM

## 2024-02-28 RX ORDER — APIXABAN 5 MG/1
5 TABLET, FILM COATED ORAL 2 TIMES DAILY
Qty: 60 TABLET | Refills: 0 | Status: SHIPPED | OUTPATIENT
Start: 2024-02-28 | End: 2024-03-12 | Stop reason: SDUPTHER

## 2024-03-01 DIAGNOSIS — F33.1 MODERATE EPISODE OF RECURRENT MAJOR DEPRESSIVE DISORDER (HCC): ICD-10-CM

## 2024-03-04 RX ORDER — ESCITALOPRAM OXALATE 10 MG/1
10 TABLET ORAL DAILY
Qty: 90 TABLET | Refills: 0 | Status: SHIPPED | OUTPATIENT
Start: 2024-03-04

## 2024-03-04 NOTE — TELEPHONE ENCOUNTER
Received request via: Pharmacy    Was the patient seen in the last year in this department? Yes    Does the patient have an active prescription (recently filled or refills available) for medication(s) requested? No    Pharmacy Name: [facility]     Does the patient have retirement Plus and need 100 day supply (blood pressure, diabetes and cholesterol meds only)? Patient does not have SCP

## 2024-03-12 ENCOUNTER — TELEPHONE (OUTPATIENT)
Dept: CARDIOLOGY | Facility: MEDICAL CENTER | Age: 69
End: 2024-03-12

## 2024-03-12 ENCOUNTER — OFFICE VISIT (OUTPATIENT)
Dept: CARDIOLOGY | Facility: MEDICAL CENTER | Age: 69
End: 2024-03-12
Attending: INTERNAL MEDICINE
Payer: MEDICARE

## 2024-03-12 VITALS
BODY MASS INDEX: 34.44 KG/M2 | WEIGHT: 246 LBS | RESPIRATION RATE: 16 BRPM | SYSTOLIC BLOOD PRESSURE: 122 MMHG | OXYGEN SATURATION: 94 % | HEART RATE: 73 BPM | DIASTOLIC BLOOD PRESSURE: 76 MMHG | HEIGHT: 71 IN

## 2024-03-12 DIAGNOSIS — E66.09 CLASS 1 OBESITY DUE TO EXCESS CALORIES WITHOUT SERIOUS COMORBIDITY WITH BODY MASS INDEX (BMI) OF 33.0 TO 33.9 IN ADULT: ICD-10-CM

## 2024-03-12 DIAGNOSIS — Z78.9 ALCOHOL USE: ICD-10-CM

## 2024-03-12 DIAGNOSIS — Z79.01 ANTICOAGULATED: ICD-10-CM

## 2024-03-12 DIAGNOSIS — Z98.890 S/P ABLATION OF ATRIAL FIBRILLATION: ICD-10-CM

## 2024-03-12 DIAGNOSIS — I48.0 PAROXYSMAL ATRIAL FIBRILLATION (HCC): ICD-10-CM

## 2024-03-12 DIAGNOSIS — E78.5 DYSLIPIDEMIA: Chronic | ICD-10-CM

## 2024-03-12 DIAGNOSIS — Z86.79 S/P ABLATION OF ATRIAL FIBRILLATION: ICD-10-CM

## 2024-03-12 LAB — EKG IMPRESSION: NORMAL

## 2024-03-12 PROCEDURE — 99214 OFFICE O/P EST MOD 30 MIN: CPT | Performed by: INTERNAL MEDICINE

## 2024-03-12 PROCEDURE — 3074F SYST BP LT 130 MM HG: CPT | Performed by: INTERNAL MEDICINE

## 2024-03-12 PROCEDURE — 93010 ELECTROCARDIOGRAM REPORT: CPT | Performed by: INTERNAL MEDICINE

## 2024-03-12 PROCEDURE — 3078F DIAST BP <80 MM HG: CPT | Performed by: INTERNAL MEDICINE

## 2024-03-12 PROCEDURE — 93005 ELECTROCARDIOGRAM TRACING: CPT | Performed by: INTERNAL MEDICINE

## 2024-03-12 PROCEDURE — 99213 OFFICE O/P EST LOW 20 MIN: CPT | Performed by: INTERNAL MEDICINE

## 2024-03-12 RX ORDER — ATORVASTATIN CALCIUM 10 MG/1
10 TABLET, FILM COATED ORAL DAILY
Qty: 90 TABLET | Refills: 3 | Status: SHIPPED | OUTPATIENT
Start: 2024-03-12

## 2024-03-12 RX ORDER — APIXABAN 5 MG/1
5 TABLET, FILM COATED ORAL 2 TIMES DAILY
Qty: 180 TABLET | Refills: 3 | Status: SHIPPED | OUTPATIENT
Start: 2024-03-12

## 2024-03-12 RX ORDER — ESCITALOPRAM OXALATE 20 MG/1
20 TABLET ORAL DAILY
COMMUNITY

## 2024-03-12 ASSESSMENT — FIBROSIS 4 INDEX: FIB4 SCORE: 1.03

## 2024-03-12 NOTE — PROGRESS NOTES
Chief Complaint   Patient presents with    Atrial Fibrillation     F/v Dx:Paroxysmal atrial fibrillation (HCC)         Subjective     Dean Nobles is a 69 y.o. male who presents today with history of PAF status post ablation no significant recurrence.  [id number] score 2.  On DOAC.  Obesity.  Interested in Wegovy.  Eliquis somewhat expensive.    Past Medical History:   Diagnosis Date    ADD (attention deficit disorder) 2012    Arrhythmia     Atrial fibrillation (HCC)     BPH (benign prostatic hyperplasia) 2012    Chickenpox     Depression     Dyslipidemia 2012    ED (erectile dysfunction) 2012    Frequent urination     Polish measles     Heartburn     HTN (hypertension), benign 2012    Hypertension     Influenza     Mumps     Rheumatic fever     Shortness of breath     Sleep apnea     awaiting cpap    Swelling of lower extremity     Vision loss     Wears glasses      Past Surgical History:   Procedure Laterality Date    EYE SURGERY      12/3/21 Lazer surgery to releive pressure     LAMINOTOMY       Family History   Problem Relation Age of Onset    Hyperlipidemia Mother     Heart Disease Mother          of heart attack at age of 80    Diabetes Mother         Type 2    Cancer Father         skin    Other Father         liver cirrhosis    Diabetes Sister     Hypertension Sister     Kidney Disease Sister     Diabetes Sister     Hypertension Sister     Other Sister         Kidney failure    Hyperlipidemia Brother     Heart Disease Brother         arrhysmia    Atrial fibrillation Brother     Sleep Apnea Brother     Ovarian Cancer Neg Hx     Tubal Cancer Neg Hx     Peritoneal Cancer Neg Hx     Colorectal Cancer Neg Hx     Breast Cancer Neg Hx     Stroke Neg Hx      Social History     Socioeconomic History    Marital status:      Spouse name: Not on file    Number of children: Not on file    Years of education: Not on file    Highest education level: Bachelor's degree  (e.g., BA, AB, BS)   Occupational History    Not on file   Tobacco Use    Smoking status: Former     Current packs/day: 0.00     Average packs/day: 2.0 packs/day for 15.0 years (30.0 ttl pk-yrs)     Types: Cigarettes     Start date:      Quit date:      Years since quittin.2    Smokeless tobacco: Never    Tobacco comments:     16-30   Vaping Use    Vaping Use: Never used   Substance and Sexual Activity    Alcohol use: Yes     Alcohol/week: 12.6 oz     Types: 21 Glasses of wine per week     Comment: daily vodka - 4-5 cocktailes or wine - 3 glasses    Drug use: No    Sexual activity: Yes     Partners: Female   Other Topics Concern    Not on file   Social History Narrative    Retired [employer], [employer] and all around the country.      Social Determinants of Health     Financial Resource Strain: Low Risk  (2023)    Overall Financial Resource Strain (CARDIA)     Difficulty of Paying Living Expenses: Not very hard   Food Insecurity: No Food Insecurity (2023)    Hunger Vital Sign     Worried About Running Out of Food in the Last Year: Never true     Ran Out of Food in the Last Year: Never true   Transportation Needs: Unknown (2023)    PRAPARE - Transportation     Lack of Transportation (Medical): Not on file     Lack of Transportation (Non-Medical): No   Physical Activity: Inactive (2023)    Exercise Vital Sign     Days of Exercise per Week: 0 days     Minutes of Exercise per Session: 0 min   Stress: No Stress Concern Present (2023)    [place] [employer] - Occupational Stress Questionnaire     Feeling of Stress : Not at all   Social Connections: Moderately Isolated (2023)    Social Connection and Isolation Panel [NHANES]     Frequency of Communication with Friends and Family: Three times a week     Frequency of Social Gatherings with Friends and Family: Once a week     Attends Jewish Services: Never     Active Member of Clubs or  "Organizations: No     Attends Club or Organization Meetings: Never     Marital Status:    Intimate Partner Violence: Not on file   Housing Stability: Low Risk  (11/12/2023)    Housing Stability Vital Sign     Unable to Pay for Housing in the Last Year: No     Number of Places Lived in the Last Year: 2     Unstable Housing in the Last Year: No     No Known Allergies  Outpatient Encounter Medications as of 3/12/2024   Medication Sig Dispense Refill    escitalopram (LEXAPRO) 20 MG tablet Take 20 mg by mouth every day.      ELIQUIS 5 MG Tab Take 1 Tablet by mouth 2 times a day. 180 Tablet 3    atorvastatin (LIPITOR) 10 MG Tab Take 1 Tablet by mouth every day. 90 Tablet 3    Semaglutide (WEGOVY) 0.25 MG/0.5ML Solution Auto-injector Pen-injector Inject 0.5 mL under the skin every 7 days. 4 Each 0    escitalopram (LEXAPRO) 10 MG Tab Take 1 tablet by mouth once daily 90 Tablet 0    tadalafil (CIALIS) 10 MG tablet Take 1 Tablet by mouth as needed for Erectile Dysfunction. 10 Tablet 3    carvedilol (COREG) 6.25 MG Tab Take 1 Tablet by mouth 2 times a day with meals. 60 Tablet 11    spironolactone (ALDACTONE) 50 MG Tab Take 1 Tablet by mouth every day. 90 Tablet 3    benazepril (LOTENSIN) 40 MG tablet Take 1 Tablet by mouth every day. 100 Tablet 3    latanoprost (XALATAN) 0.005 % Solution Administer 1 Drop into both eyes every day.      timolol (TIMOPTIC) 0.5 % Solution Administer 1 Drop into both eyes every day.      omeprazole (PRILOSEC) 20 MG delayed-release capsule Take 1 Cap by mouth every day. 90 Cap 3    [DISCONTINUED] ELIQUIS 5 MG Tab Take 1 Tablet by mouth 2 times a day. 60 Tablet 0    [DISCONTINUED] atorvastatin (LIPITOR) 10 MG Tab Take 1 Tablet by mouth every day. 90 Tablet 3     No facility-administered encounter medications on file as of 3/12/2024.     ROS           Objective     /76 (BP Location: Left arm, Patient Position: Sitting, BP Cuff Size: Adult)   Pulse 73   Resp 16   Ht 1.803 m (5' 11\")  "  Wt 112 kg (246 lb)   SpO2 94%   BMI 34.31 kg/m²     Physical Exam  Constitutional:       Appearance: He is well-developed. He is obese.   HENT:      Head: Normocephalic and atraumatic.   Cardiovascular:      Rate and Rhythm: Normal rate and regular rhythm.      Heart sounds: No murmur heard.     No friction rub. No gallop.   Pulmonary:      Effort: Pulmonary effort is normal.      Breath sounds: Normal breath sounds.   Abdominal:      Palpations: Abdomen is soft.   Musculoskeletal:         General: Normal range of motion.      Cervical back: Normal range of motion and neck supple.   Skin:     General: Skin is warm and dry.   Neurological:      Mental Status: He is alert and oriented to person, place, and time.   Psychiatric:         Behavior: Behavior normal.         Thought Content: Thought content normal.         Judgment: Judgment normal.                Assessment & Plan     1. S/P ablation of atrial fibrillation        2. Paroxysmal atrial fibrillation (HCC)  EKG    ELIQUIS 5 MG Tab      3. Dyslipidemia  atorvastatin (LIPITOR) 10 MG Tab      4. Anticoagulated        5. Alcohol use        6. Class 1 obesity due to excess calories without serious comorbidity with body mass index (BMI) of 33.0 to 33.9 in adult  Semaglutide (WEGOVY) 0.25 MG/0.5ML Solution Auto-injector Pen-injector          Medical Decision Making: Today's Assessment/Status/Plan:   1.  Atrial fibrillation status post ablation no recurrence.  Continue DOAC for now.  See if we can get an agent somewhat cheaper.  2.  Written for Wegovy.  3.  Hyperlipidemia on statins.  4.  Follow-up me in 6 months.

## 2024-03-12 NOTE — TELEPHONE ENCOUNTER
I wrote him for wegovy. Will he get insurance coverage. He also is paying a lot for his DOAC. It can be changed to a different agent if needed.   lance

## 2024-04-13 ENCOUNTER — TELEPHONE (OUTPATIENT)
Dept: CARDIOLOGY | Facility: MEDICAL CENTER | Age: 69
End: 2024-04-13
Payer: MEDICARE

## 2024-04-14 NOTE — TELEPHONE ENCOUNTER
Received EMR message in Cardiology clinic, ran test claim and rejected for Prior Authorization is required. Prior Authorization for WEGOVY 0.25 MG/0.5ML Solution Auto-injector Pen-injector (Quantity: 6mL, Days: 84) has been submitted via Cover My Meds: Key ((Key: [id number])    Insurance: [employer] - [employer] D    Will follow up in 24-48 business hours.

## 2024-04-23 ENCOUNTER — PATIENT MESSAGE (OUTPATIENT)
Dept: MEDICAL GROUP | Facility: PHYSICIAN GROUP | Age: 69
End: 2024-04-23
Payer: MEDICARE

## 2024-04-23 DIAGNOSIS — F33.1 MODERATE EPISODE OF RECURRENT MAJOR DEPRESSIVE DISORDER (HCC): ICD-10-CM

## 2024-04-25 RX ORDER — ESCITALOPRAM OXALATE 20 MG/1
20 TABLET ORAL DAILY
Qty: 90 TABLET | Refills: 0 | Status: SHIPPED | OUTPATIENT
Start: 2024-04-25

## 2024-04-25 RX ORDER — ESCITALOPRAM OXALATE 10 MG/1
10 TABLET ORAL DAILY
Qty: 90 TABLET | Refills: 0 | Status: SHIPPED | OUTPATIENT
Start: 2024-04-25

## 2024-05-30 ENCOUNTER — TELEPHONE (OUTPATIENT)
Dept: CARDIOLOGY | Facility: MEDICAL CENTER | Age: 69
End: 2024-05-30
Payer: MEDICARE

## 2024-05-30 NOTE — LETTER
PROCEDURE/SURGERY CLEARANCE FORM      Encounter Date: 5/30/2024    Patient: Dean Nobles  YOB: 1955    CARDIOLOGIST:  Naga Davalos M.D.    REFERRING DOCTOR:  No ref. provider found    Procedure/surgery: Cataract                                           PROCEDURE/SURGERY CLEARANCE FORM    Date: 5/30/2024   Patient Name: Dean Nobles    Dear Surgeon or Proceduralist,      Thank you for your request for cardiac stratification of our mutual patient Dean Nobles 1955. We have reviewed their [facility] records; and to the best of our understanding this patient has not had stenting, ablation, cardiothoracic surgery or hospitalization for cardiovascular reasons in the past 6 months.  Dean Nobles has been seen within the past 18 months and is considered to have non-modifiable cardiac risk for this low-risk procedure/surgery. They may proceed from a cardiovascular standpoint and may hold their antiplatelet/anticoagulation as briefly as possible. Please have patient resume this medication when hemodynamically stable to do so.     Aspirin or Prasugrel   - hold 7 days prior to procedure/surgery, resume when hemodynamically stable      Clopidrogrel or Ticagrelor  - hold 7 days for all neurological procedures, hold 5 days prior to all other procedure/surgery,  resume when hemodynamically stable     Warfarin - hold 7 days for all neurological procedures, hold 5 days prior to all other procedure/surgery and coordinate with [facility] ([phone]) INR testing and dose management.      Pradaxa/Xarelto/Eliquis/Savesya - hold 1 day prior to procedure for low bleeding risk procedure, 2 days for high bleeding risk procedure, or consider holding 3 days or longer for patients with reduced kidney function (CrCl <30mL/min) or spinal/cranial surgeries/procedures.      If they have a mechanical heart valve, please coordinate with [facility] ([phone])  the proper management of their anticoagulant in the periprocedural or perioperative period.      Some patients have higher risk for cardiovascular complications or holding medication. If our patient has had prior complications of holding antiplatelet or anticoagulants in the past and we have seen them after these events, we have addressed these concerns with the patient. They are at an unknown degree of increased risk for recurrent complication.  You may hold anticoagulation/antiplatelets for the procedure or surgery if the benefits of the procedure or surgery outweigh this nonmodifiable risk.      If Dean Nobles 1955 has new symptoms of heart failure decompensation, unstable arrythmia, or angina please reach out and we will assess the patient.      If you have other patient-specific concerns, please feel free to reach out to the patient's cardiologist directly at [phone].     Thank you,       [facility]       Electronically Signed      MD Signature   Naga Davalos M.D.

## 2024-05-31 NOTE — TELEPHONE ENCOUNTER
Last OV: 3.12.24  Proposed Surgery: Cataract  Surgery Date: 6/20/24  Requesting Office Name: [facility]  Fax Number: [phone]  Preference of Location (default is surgery center unless specified by Cardiologist or [facility])  Prior Clearance Addressed: No      Anticoags/Antiplatelets: Apixaban   Anticoags/Antiplatelet managed by Cardiology? YES    Outstanding Cardiac Imaging : No  Stent, Cardiac Devices, or Catheterization: No  Ablation, TAVR/Valve (including open heart), Cardioversion: Yes  Date: 06/16/23  >3 months post procedure for dental work request OR >6 months post procedure, send clearance letter  Recent Cardiac Hospitalization: No            When: N/A  History (cardiac history):   Past Medical History:   Diagnosis Date    ADD (attention deficit disorder) 01/05/2012    Arrhythmia     Atrial fibrillation (HCC)     BPH (benign prostatic hyperplasia) 01/05/2012    Chickenpox     Depression     Dyslipidemia 01/05/2012    ED (erectile dysfunction) 01/05/2012    Frequent urination     Sinhala measles     Heartburn     HTN (hypertension), benign 01/05/2012    Hypertension     Influenza     Mumps     Rheumatic fever     Shortness of breath     Sleep apnea 2023    awaiting cpap    Swelling of lower extremity     Vision loss     Wears glasses              Surgical Clearance Letter Sent: YES   **Scan clearance request letter into [facility].**

## 2024-06-10 DIAGNOSIS — I10 ESSENTIAL HYPERTENSION: ICD-10-CM

## 2024-06-11 ENCOUNTER — PATIENT MESSAGE (OUTPATIENT)
Dept: MEDICAL GROUP | Facility: PHYSICIAN GROUP | Age: 69
End: 2024-06-11
Payer: MEDICARE

## 2024-06-11 RX ORDER — OMEPRAZOLE 20 MG/1
20 CAPSULE, DELAYED RELEASE ORAL DAILY
Qty: 90 CAPSULE | Refills: 3 | Status: SHIPPED | OUTPATIENT
Start: 2024-06-11

## 2024-06-11 RX ORDER — BENAZEPRIL HYDROCHLORIDE 40 MG/1
40 TABLET ORAL
Qty: 90 TABLET | Refills: 2 | Status: SHIPPED | OUTPATIENT
Start: 2024-06-11

## 2024-06-11 NOTE — TELEPHONE ENCOUNTER
Is the patient due for a refill? Yes    Was the patient seen the past year? Yes    Date of last office visit: 03/12/2024    Does the patient have an upcoming appointment?  Yes   If yes, When? 09/17/2023    Provider to refill:FRANCES    Does the patients insurance require a 100 day supply?  No

## 2024-06-11 NOTE — PATIENT COMMUNICATION
Received request via: Patient    Was the patient seen in the last year in this department? Yes    Does the patient have an active prescription (recently filled or refills available) for medication(s) requested? No    [facility]     Does the patient have [facility] Plus and need 100 day supply (blood pressure, diabetes and cholesterol meds only)? Patient does not have SCP

## 2024-06-17 DIAGNOSIS — I10 ESSENTIAL HYPERTENSION: ICD-10-CM

## 2024-06-18 RX ORDER — SPIRONOLACTONE 50 MG/1
50 TABLET, FILM COATED ORAL DAILY
Qty: 90 TABLET | Refills: 3 | Status: SHIPPED | OUTPATIENT
Start: 2024-06-18

## 2024-06-18 NOTE — TELEPHONE ENCOUNTER
To: DS    Would you like patient to continue Spironolactone? Please advise. Did not see in last OV note. Thank you

## 2024-07-09 DIAGNOSIS — F33.1 MODERATE EPISODE OF RECURRENT MAJOR DEPRESSIVE DISORDER (HCC): ICD-10-CM

## 2024-07-09 DIAGNOSIS — E66.09 CLASS 1 OBESITY DUE TO EXCESS CALORIES WITHOUT SERIOUS COMORBIDITY WITH BODY MASS INDEX (BMI) OF 33.0 TO 33.9 IN ADULT: ICD-10-CM

## 2024-07-09 RX ORDER — ESCITALOPRAM OXALATE 10 MG/1
10 TABLET ORAL DAILY
Qty: 90 TABLET | Refills: 0 | Status: SHIPPED | OUTPATIENT
Start: 2024-07-09

## 2024-07-09 RX ORDER — ESCITALOPRAM OXALATE 20 MG/1
20 TABLET ORAL DAILY
Qty: 90 TABLET | Refills: 0 | Status: SHIPPED | OUTPATIENT
Start: 2024-07-09

## 2024-07-12 DIAGNOSIS — E66.09 CLASS 1 OBESITY DUE TO EXCESS CALORIES WITHOUT SERIOUS COMORBIDITY WITH BODY MASS INDEX (BMI) OF 33.0 TO 33.9 IN ADULT: ICD-10-CM

## 2024-07-29 DIAGNOSIS — I10 ESSENTIAL HYPERTENSION: ICD-10-CM

## 2024-07-29 RX ORDER — CARVEDILOL 6.25 MG/1
6.25 TABLET ORAL 2 TIMES DAILY WITH MEALS
Qty: 180 TABLET | Refills: 2 | Status: SHIPPED | OUTPATIENT
Start: 2024-07-29

## 2024-08-09 ENCOUNTER — APPOINTMENT (RX ONLY)
Dept: URBAN - METROPOLITAN AREA CLINIC 22 | Facility: CLINIC | Age: 69
Setting detail: DERMATOLOGY
End: 2024-08-09

## 2024-08-09 DIAGNOSIS — L82.1 OTHER SEBORRHEIC KERATOSIS: ICD-10-CM

## 2024-08-09 DIAGNOSIS — S60 SUPERFICIAL INJURY OF WRIST, HAND AND FINGERS: ICD-10-CM

## 2024-08-09 PROBLEM — S60.021A CONTUSION OF RIGHT INDEX FINGER WITHOUT DAMAGE TO NAIL, INITIAL ENCOUNTER: Status: ACTIVE | Noted: 2024-08-09

## 2024-08-09 PROCEDURE — ? COUNSELING

## 2024-08-09 PROCEDURE — 99212 OFFICE O/P EST SF 10 MIN: CPT

## 2024-08-09 ASSESSMENT — LOCATION DETAILED DESCRIPTION DERM
LOCATION DETAILED: RIGHT INDEX FINGER LUNULA
LOCATION DETAILED: LEFT INFERIOR LATERAL UPPER BACK
LOCATION DETAILED: LEFT SUPERIOR UPPER BACK
LOCATION DETAILED: LEFT CLAVICULAR SKIN

## 2024-08-09 ASSESSMENT — LOCATION ZONE DERM
LOCATION ZONE: FINGERNAIL
LOCATION ZONE: TRUNK

## 2024-08-09 ASSESSMENT — LOCATION SIMPLE DESCRIPTION DERM
LOCATION SIMPLE: LEFT UPPER BACK
LOCATION SIMPLE: RIGHT INDEX FINGERNAIL
LOCATION SIMPLE: LEFT CLAVICULAR SKIN

## 2024-09-02 DIAGNOSIS — E66.09 CLASS 1 OBESITY DUE TO EXCESS CALORIES WITHOUT SERIOUS COMORBIDITY WITH BODY MASS INDEX (BMI) OF 33.0 TO 33.9 IN ADULT: ICD-10-CM

## 2024-09-17 ENCOUNTER — OFFICE VISIT (OUTPATIENT)
Dept: CARDIOLOGY | Facility: MEDICAL CENTER | Age: 69
End: 2024-09-17
Attending: INTERNAL MEDICINE
Payer: MEDICARE

## 2024-09-17 ENCOUNTER — TELEPHONE (OUTPATIENT)
Dept: CARDIOLOGY | Facility: MEDICAL CENTER | Age: 69
End: 2024-09-17

## 2024-09-17 VITALS
WEIGHT: 240 LBS | HEART RATE: 75 BPM | DIASTOLIC BLOOD PRESSURE: 66 MMHG | SYSTOLIC BLOOD PRESSURE: 118 MMHG | RESPIRATION RATE: 16 BRPM | BODY MASS INDEX: 33.6 KG/M2 | OXYGEN SATURATION: 96 % | HEIGHT: 71 IN

## 2024-09-17 DIAGNOSIS — I10 ESSENTIAL HYPERTENSION: ICD-10-CM

## 2024-09-17 DIAGNOSIS — G47.33 OBSTRUCTIVE SLEEP APNEA SYNDROME: ICD-10-CM

## 2024-09-17 DIAGNOSIS — D68.69 SECONDARY HYPERCOAGULABLE STATE (HCC): ICD-10-CM

## 2024-09-17 DIAGNOSIS — E66.09 CLASS 1 OBESITY DUE TO EXCESS CALORIES WITHOUT SERIOUS COMORBIDITY WITH BODY MASS INDEX (BMI) OF 33.0 TO 33.9 IN ADULT: ICD-10-CM

## 2024-09-17 DIAGNOSIS — Z98.890 S/P ABLATION OF ATRIAL FIBRILLATION: ICD-10-CM

## 2024-09-17 DIAGNOSIS — Z86.79 S/P ABLATION OF ATRIAL FIBRILLATION: ICD-10-CM

## 2024-09-17 DIAGNOSIS — E78.5 DYSLIPIDEMIA: Chronic | ICD-10-CM

## 2024-09-17 DIAGNOSIS — Z79.01 ANTICOAGULATED: ICD-10-CM

## 2024-09-17 DIAGNOSIS — I48.0 PAROXYSMAL ATRIAL FIBRILLATION (HCC): ICD-10-CM

## 2024-09-17 LAB — EKG IMPRESSION: NORMAL

## 2024-09-17 PROCEDURE — 3078F DIAST BP <80 MM HG: CPT | Performed by: INTERNAL MEDICINE

## 2024-09-17 PROCEDURE — 3074F SYST BP LT 130 MM HG: CPT | Performed by: INTERNAL MEDICINE

## 2024-09-17 PROCEDURE — 93005 ELECTROCARDIOGRAM TRACING: CPT | Performed by: INTERNAL MEDICINE

## 2024-09-17 PROCEDURE — 99213 OFFICE O/P EST LOW 20 MIN: CPT | Performed by: INTERNAL MEDICINE

## 2024-09-17 PROCEDURE — 93010 ELECTROCARDIOGRAM REPORT: CPT | Performed by: INTERNAL MEDICINE

## 2024-09-17 PROCEDURE — 99214 OFFICE O/P EST MOD 30 MIN: CPT | Performed by: INTERNAL MEDICINE

## 2024-09-17 ASSESSMENT — FIBROSIS 4 INDEX: FIB4 SCORE: 1.03

## 2024-09-17 NOTE — PROGRESS NOTES
Chief Complaint   Patient presents with    Atrial Fibrillation       Subjective     Dean Nobles is a 69 y.o. male who presents today with history of paroxysmal atrial fibrillation status post PVI.  No significant recurrence.  Hypertension.  Obesity.  On Wegovy.  No significant complaints.    Past Medical History:   Diagnosis Date    ADD (attention deficit disorder) 2012    Arrhythmia     Atrial fibrillation (HCC)     BPH (benign prostatic hyperplasia) 2012    Chickenpox     Depression     Dyslipidemia 2012    ED (erectile dysfunction) 2012    Frequent urination     [place] measles     Heartburn     HTN (hypertension), benign 2012    Hypertension     Influenza     Mumps     Rheumatic fever     Shortness of breath     Sleep apnea     awaiting cpap    Swelling of lower extremity     Vision loss     Wears glasses      Past Surgical History:   Procedure Laterality Date    EYE SURGERY      12/3/21 Lazer surgery to releive pressure     LAMINOTOMY       Family History   Problem Relation Age of Onset    Hyperlipidemia Mother     Heart Disease Mother          of heart attack at age of 80    Diabetes Mother         Type 2    Cancer Father         skin    Other Father         liver cirrhosis    Diabetes Sister     Hypertension Sister     Kidney Disease Sister     Diabetes Sister     Hypertension Sister     Other Sister         Kidney failure    Hyperlipidemia Brother     Heart Disease Brother         arrhysmia    Atrial fibrillation Brother     Sleep Apnea Brother     Ovarian Cancer Neg Hx     Tubal Cancer Neg Hx     Peritoneal Cancer Neg Hx     Colorectal Cancer Neg Hx     Breast Cancer Neg Hx     Stroke Neg Hx      Social History     Socioeconomic History    Marital status:      Spouse name: Not on file    Number of children: Not on file    Years of education: Not on file    Highest education level: Bachelor's degree (e.g., BA, AB, BS)   Occupational History    Not on file    Tobacco Use    Smoking status: Former     Current packs/day: 0.00     Average packs/day: 2.0 packs/day for 15.0 years (30.0 ttl pk-yrs)     Types: Cigarettes     Start date:      Quit date:      Years since quittin.7    Smokeless tobacco: Never    Tobacco comments:     16-   Vaping Use    Vaping status: Never Used   Substance and Sexual Activity    Alcohol use: Yes     Alcohol/week: 12.6 oz     Types: 21 Glasses of wine per week     Comment: daily vodka - 4-5 cocktailes or wine - 3 glasses    Drug use: No    Sexual activity: Yes     Partners: Female   Other Topics Concern    Not on file   Social History Narrative    Retired [employer], [employer] and all around the country.      Social Determinants of Health     Financial Resource Strain: Low Risk  (2023)    Overall Financial Resource Strain (CARDIA)     Difficulty of Paying Living Expenses: Not very hard   Food Insecurity: No Food Insecurity (2023)    Hunger Vital Sign     Worried About Running Out of Food in the Last Year: Never true     Ran Out of Food in the Last Year: Never true   Transportation Needs: Unknown (2023)    PRAPARE - Transportation     Lack of Transportation (Medical): Not on file     Lack of Transportation (Non-Medical): No   Physical Activity: Inactive (2023)    Exercise Vital Sign     Days of Exercise per Week: 0 days     Minutes of Exercise per Session: 0 min   Stress: No Stress Concern Present (2023)    [place] citizen [place] of Occupational [employer] - Occupational Stress Questionnaire     Feeling of Stress : Not at all   Social Connections: Moderately Isolated (2023)    Social Connection and Isolation Panel [NHANES]     Frequency of Communication with Friends and Family: Three times a week     Frequency of Social Gatherings with Friends and Family: Once a week     Attends Episcopal Services: Never     Active Member of Clubs or Organizations: No     Attends Club or Organization Meetings: Never  "    Marital Status:    Intimate Partner Violence: Not on file   Housing Stability: Low Risk  (11/12/2023)    Housing Stability Vital Sign     Unable to Pay for Housing in the Last Year: No     Number of Places Lived in the Last Year: 2     Unstable Housing in the Last Year: No     No Known Allergies  Outpatient Encounter Medications as of 9/17/2024   Medication Sig Dispense Refill    Semaglutide (WEGOVY) 0.5 MG/0.5ML Solution Auto-injector Pen-injector Inject 1 mL under the skin every 7 days. 4 Each 3    carvedilol (COREG) 6.25 MG Tab TAKE ONE TABLET BY MOUTH TWICE A DAY WITH MEALS 180 Tablet 2    escitalopram (LEXAPRO) 20 MG tablet Take 1 Tablet by mouth every day. 90 Tablet 0    escitalopram (LEXAPRO) 10 MG Tab Take 1 Tablet by mouth every day. 90 Tablet 0    spironolactone (ALDACTONE) 50 MG Tab Take 1 Tablet by mouth every day. 90 Tablet 3    benazepril (LOTENSIN) 40 MG tablet TAKE ONE TABLET BY MOUTH EVERY DAY 90 Tablet 2    omeprazole (PRILOSEC) 20 MG delayed-release capsule Take 1 Capsule by mouth every day. 90 Capsule 3    ELIQUIS 5 MG Tab Take 1 Tablet by mouth 2 times a day. 180 Tablet 3    atorvastatin (LIPITOR) 10 MG Tab Take 1 Tablet by mouth every day. 90 Tablet 3    tadalafil (CIALIS) 10 MG tablet Take 1 Tablet by mouth as needed for Erectile Dysfunction. 10 Tablet 3    latanoprost (XALATAN) 0.005 % Solution Administer 1 Drop into both eyes every day.      timolol (TIMOPTIC) 0.5 % Solution Administer 1 Drop into both eyes every day.      [DISCONTINUED] Semaglutide (WEGOVY) 0.5 MG/0.5ML Solution Auto-injector Pen-injector Inject 0.5 mL under the skin every 7 days. 4 Each 0     No facility-administered encounter medications on file as of 9/17/2024.     ROS           Objective     /66 (BP Location: Left arm, Patient Position: Sitting, BP Cuff Size: Adult)   Pulse 75   Resp 16   Ht 1.803 m (5' 11\")   Wt 109 kg (240 lb)   SpO2 96%   BMI 33.47 kg/m²     Physical Exam  Constitutional:       " Appearance: He is well-developed.   HENT:      Head: Normocephalic and atraumatic.   Cardiovascular:      Rate and Rhythm: Normal rate and regular rhythm.      Heart sounds: No murmur heard.     No friction rub. No gallop.   Pulmonary:      Effort: Pulmonary effort is normal.      Breath sounds: Normal breath sounds.   Abdominal:      Palpations: Abdomen is soft.   Musculoskeletal:         General: Normal range of motion.      Cervical back: Normal range of motion and neck supple.   Skin:     General: Skin is warm and dry.   Neurological:      Mental Status: He is alert and oriented to person, place, and time.   Psychiatric:         Behavior: Behavior normal.         Thought Content: Thought content normal.         Judgment: Judgment normal.                Assessment & Plan     1. Paroxysmal atrial fibrillation (HCC)  EKG      2. S/P ablation of atrial fibrillation        3. Secondary hypercoagulable state (HCC)        4. Obstructive sleep apnea syndrome        5. Anticoagulated        6. Dyslipidemia        7. Class 1 obesity due to excess calories without serious comorbidity with body mass index (BMI) of 33.0 to 33.9 in adult  Semaglutide (WEGOVY) 0.5 MG/0.5ML Solution Auto-injector Pen-injector      8. Essential hypertension            Medical Decision Making: Today's Assessment/Status/Plan:   1.  Obesity.  Escalate dose of Wegovy.  2.  Anticoagulation continue DOAC.  3.  Hypertension continue current regimen.  4.  Atrial fibrillation no evidence of recurrence.  5.  Follow-up in 6 months.

## 2024-10-11 DIAGNOSIS — F33.1 MODERATE EPISODE OF RECURRENT MAJOR DEPRESSIVE DISORDER (HCC): ICD-10-CM

## 2024-10-14 RX ORDER — ESCITALOPRAM OXALATE 10 MG/1
10 TABLET ORAL DAILY
Qty: 90 TABLET | Refills: 0 | Status: SHIPPED | OUTPATIENT
Start: 2024-10-14

## 2024-10-14 RX ORDER — ESCITALOPRAM OXALATE 20 MG/1
20 TABLET ORAL DAILY
Qty: 90 TABLET | Refills: 0 | Status: SHIPPED | OUTPATIENT
Start: 2024-10-14

## 2024-12-29 DIAGNOSIS — F33.1 MODERATE EPISODE OF RECURRENT MAJOR DEPRESSIVE DISORDER (HCC): ICD-10-CM

## 2024-12-30 RX ORDER — ESCITALOPRAM OXALATE 20 MG/1
20 TABLET ORAL DAILY
Qty: 90 TABLET | Refills: 0 | Status: SHIPPED | OUTPATIENT
Start: 2024-12-30

## 2024-12-30 RX ORDER — ESCITALOPRAM OXALATE 10 MG/1
10 TABLET ORAL DAILY
Qty: 90 TABLET | Refills: 0 | Status: SHIPPED | OUTPATIENT
Start: 2024-12-30

## 2024-12-30 NOTE — TELEPHONE ENCOUNTER
Received request via: Patient    Was the patient seen in the last year in this department? Yes    Does the patient have an active prescription (recently filled or refills available) for medication(s) requested? No    Pharmacy Name: [facility] [address], [place] - [address]     Does the patient have [facility] Plus and need 100-day supply? (This applies to ALL medications) Patient does not have SCP

## 2025-01-02 ENCOUNTER — APPOINTMENT (OUTPATIENT)
Dept: SLEEP MEDICINE | Facility: MEDICAL CENTER | Age: 70
End: 2025-01-02
Attending: STUDENT IN AN ORGANIZED HEALTH CARE EDUCATION/TRAINING PROGRAM
Payer: MEDICARE

## 2025-01-02 VITALS
WEIGHT: 255.7 LBS | OXYGEN SATURATION: 95 % | HEIGHT: 71 IN | SYSTOLIC BLOOD PRESSURE: 116 MMHG | DIASTOLIC BLOOD PRESSURE: 78 MMHG | RESPIRATION RATE: 16 BRPM | HEART RATE: 73 BPM | BODY MASS INDEX: 35.8 KG/M2

## 2025-01-02 DIAGNOSIS — G47.33 OSA (OBSTRUCTIVE SLEEP APNEA): ICD-10-CM

## 2025-01-02 PROCEDURE — 3078F DIAST BP <80 MM HG: CPT | Performed by: STUDENT IN AN ORGANIZED HEALTH CARE EDUCATION/TRAINING PROGRAM

## 2025-01-02 PROCEDURE — 3074F SYST BP LT 130 MM HG: CPT | Performed by: STUDENT IN AN ORGANIZED HEALTH CARE EDUCATION/TRAINING PROGRAM

## 2025-01-02 PROCEDURE — 99213 OFFICE O/P EST LOW 20 MIN: CPT | Performed by: STUDENT IN AN ORGANIZED HEALTH CARE EDUCATION/TRAINING PROGRAM

## 2025-01-02 PROCEDURE — 99212 OFFICE O/P EST SF 10 MIN: CPT | Performed by: STUDENT IN AN ORGANIZED HEALTH CARE EDUCATION/TRAINING PROGRAM

## 2025-01-02 ASSESSMENT — PATIENT HEALTH QUESTIONNAIRE - PHQ9: CLINICAL INTERPRETATION OF PHQ2 SCORE: 0

## 2025-01-02 ASSESSMENT — FIBROSIS 4 INDEX: FIB4 SCORE: 1.03

## 2025-01-02 NOTE — PROGRESS NOTES
[facility] Follow-up Visit    CC: Follow-up regarding management of obstructive sleep apnea      HPI:  Dean Nobles is a 69 y.o.male  with MDD, GERD, dyslipidemia, atrial fibrillation, obesity, and obstructive sleep apnea on BiPAP.  Presents today to follow-up regarding management of obstructive sleep apnea.    He continues to use his BiPAP machine nightly.  Overall states he is doing well.  He finds the mask and pressures comfortable.  He has been having some difficulty regarding shipping on his PAP supplies.  Because of this he would like to consider changing equipment companies.    DME provider: would like to change to CPAP and more   Device: Aircurve 10   Mask: hybrid fullface   Aerophagia: No   Snoring: No   Dry mouth: Yes at times   Leak: Yes occasionally   Skin irritation: No   Chin strap: No      Sleep History  4/13/2023 PSG split-night study showed severe obstructive sleep apnea with an overall AHI of 55.6, minimal oxygen saturation of 85%, time at or below 88% saturation 14.6 minutes.  Patient was tried on CPAP and BiPAP during night of study.  Respiratory events and oxygen saturations improved with BiPAP therapy.  Recommended auto BiPAP EPAP 12 IPAP 17 pressure support 4    Patient Active Problem List    Diagnosis Date Noted    Other chest pain 11/14/2023    S/P ablation of atrial fibrillation 09/13/2023    Alcohol use 09/13/2023    Anticoagulated 04/24/2023    Secondary hypercoagulable state (HCC) 03/22/2023    Obstructive sleep apnea syndrome 03/22/2023    Leg swelling 03/22/2023    Paroxysmal atrial fibrillation (HCC) 02/15/2023    Other specified glaucoma 10/26/2022    Blood in stool 12/07/2021    Rash 02/09/2021    Pro's esophagus 10/16/2020    Class 1 obesity due to excess calories without serious comorbidity with body mass index (BMI) of 33.0 to 33.9 in adult 01/30/2017    Essential hypertension 01/05/2012    Dyslipidemia 01/05/2012    Moderate episode of recurrent major  depressive disorder (HCC) 2012    BPH (benign prostatic hyperplasia) 2012    ADD (attention deficit disorder) 2012    ED (erectile dysfunction) 2012       Past Medical History:   Diagnosis Date    ADD (attention deficit disorder) 2012    Arrhythmia     Atrial fibrillation (HCC)     BPH (benign prostatic hyperplasia) 2012    Chickenpox     Depression     Dyslipidemia 2012    ED (erectile dysfunction) 2012    Frequent urination     Polish measles     Heartburn     HTN (hypertension), benign 2012    Hypertension     Influenza     Mumps     Rheumatic fever     Shortness of breath     Sleep apnea     awaiting cpap    Swelling of lower extremity     Vision loss     Wears glasses         Past Surgical History:   Procedure Laterality Date    EYE SURGERY      12/3/21 Lazer surgery to releive pressure     LAMINOTOMY         Family History   Problem Relation Age of Onset    Hyperlipidemia Mother     Heart Disease Mother          of heart attack at age of 80    Diabetes Mother         Type 2    Cancer Father         skin    Other Father         liver cirrhosis    Diabetes Sister     Hypertension Sister     Kidney Disease Sister     Diabetes Sister     Hypertension Sister     Other Sister         Kidney failure    Hyperlipidemia Brother     Heart Disease Brother         arrhysmia    Atrial fibrillation Brother     Sleep Apnea Brother     Ovarian Cancer Neg Hx     Tubal Cancer Neg Hx     Peritoneal Cancer Neg Hx     Colorectal Cancer Neg Hx     Breast Cancer Neg Hx     Stroke Neg Hx        Social History     Socioeconomic History    Marital status:      Spouse name: Not on file    Number of children: Not on file    Years of education: Not on file    Highest education level: Bachelor's degree (e.g., BA, AB, BS)   Occupational History    Not on file   Tobacco Use    Smoking status: Former     Current packs/day: 0.00     Average packs/day: 2.0 packs/day for 15.0  years (30.0 ttl pk-yrs)     Types: Cigarettes     Start date:      Quit date:      Years since quittin.0    Smokeless tobacco: Never    Tobacco comments:     16-30   Vaping Use    Vaping status: Never Used   Substance and Sexual Activity    Alcohol use: Yes     Alcohol/week: 12.6 oz     Types: 21 Glasses of wine per week     Comment: daily vodka - 4-5 cocktailes or wine - 3 glasses    Drug use: No    Sexual activity: Yes     Partners: Female   Other Topics Concern    Not on file   Social History Narrative    Retired [employer], [employer] and all around the country.      Social Drivers of Health     Financial Resource Strain: Low Risk  (2023)    Overall Financial Resource Strain (CARDIA)     Difficulty of Paying Living Expenses: Not very hard   Food Insecurity: No Food Insecurity (2023)    Hunger Vital Sign     Worried About Running Out of Food in the Last Year: Never true     Ran Out of Food in the Last Year: Never true   Transportation Needs: Unknown (2023)    PRAPARE - Transportation     Lack of Transportation (Medical): Not on file     Lack of Transportation (Non-Medical): No   Physical Activity: Inactive (2023)    Exercise Vital Sign     Days of Exercise per Week: 0 days     Minutes of Exercise per Session: 0 min   Stress: No Stress Concern Present (2023)    [employer] - Occupational Stress Questionnaire     Feeling of Stress : Not at all   Social Connections: Moderately Isolated (2023)    Social Connection and Isolation Panel [NHANES]     Frequency of Communication with Friends and Family: Three times a week     Frequency of Social Gatherings with Friends and Family: Once a week     Attends Catholic Services: Never     Active Member of Clubs or Organizations: No     Attends Club or Organization Meetings: Never     Marital Status:    Intimate Partner Violence: Not on file   Housing Stability: Low Risk  (2023)     "Housing Stability Vital Sign     Unable to Pay for Housing in the Last Year: No     Number of Places Lived in the Last Year: 2     Unstable Housing in the Last Year: No       Current Outpatient Medications   Medication Sig Dispense Refill    escitalopram (LEXAPRO) 20 MG tablet Take 1 Tablet by mouth every day. 90 Tablet 0    escitalopram (LEXAPRO) 10 MG Tab Take 1 Tablet by mouth every day. 90 Tablet 0    Semaglutide (WEGOVY) 0.5 MG/0.5ML Solution Auto-injector Pen-injector Inject 1 mL under the skin every 7 days. 4 Each 3    carvedilol (COREG) 6.25 MG Tab TAKE ONE TABLET BY MOUTH TWICE A DAY WITH MEALS 180 Tablet 2    spironolactone (ALDACTONE) 50 MG Tab Take 1 Tablet by mouth every day. 90 Tablet 3    benazepril (LOTENSIN) 40 MG tablet TAKE ONE TABLET BY MOUTH EVERY DAY 90 Tablet 2    omeprazole (PRILOSEC) 20 MG delayed-release capsule Take 1 Capsule by mouth every day. 90 Capsule 3    ELIQUIS 5 MG Tab Take 1 Tablet by mouth 2 times a day. 180 Tablet 3    atorvastatin (LIPITOR) 10 MG Tab Take 1 Tablet by mouth every day. 90 Tablet 3    tadalafil (CIALIS) 10 MG tablet Take 1 Tablet by mouth as needed for Erectile Dysfunction. 10 Tablet 3    latanoprost (XALATAN) 0.005 % Solution Administer 1 Drop into both eyes every day.      timolol (TIMOPTIC) 0.5 % Solution Administer 1 Drop into both eyes every day.       No current facility-administered medications for this visit.        ALLERGIES: Patient has no known allergies.    ROS  Constitutional: Denies fevers, Denies weight changes  Ears/Nose/Throat/Mouth: Denies nasal congestion or sore throat   Cardiovascular: Denies chest pain  Respiratory: Denies shortness of breath, Denies cough  Gastrointestinal/Hepatic: Denies nausea, vomiting  Sleep: see HPI      PHYSICAL EXAM  /78 (BP Location: Left arm, Patient Position: Sitting, BP Cuff Size: Adult)   Pulse 73   Resp 16   Ht 1.803 m (5' 11\")   Wt 116 kg (255 lb 11.2 oz)   SpO2 95%   BMI 35.66 kg/m²   Appearance: " Well-nourished, well-developed, no acute distress  Eyes:  No scleral icterus , EOMI  Musculoskeletal:  Grossly normal; gait and station normal; digits and nails normal  Skin:  No rashes, petechiae, cyanosis  Neurologic: without focal signs; oriented to person, time, place, and purpose; judgement intact      Medical Decision Making   Assessment and Plan  Dean Nobles is a 69 y.o.male  with MDD, GERD, dyslipidemia, atrial fibrillation, obesity, and obstructive sleep apnea on BiPAP.  Presents today to follow-up regarding management of obstructive sleep apnea.    The medical record was reviewed.    Obstructive sleep apnea  Compliance data reviewed showing 97% usage > 4hours in last 30  days. Average AHI 2.1 events/hour. Pt continues to use and benefit from machine.      Current Settings auto BiPAP EPAP 12 IPAP 17 pressure support 4    PLAN:   -Order placed for mask and supplies   -Advised to reach out via MyChart with questions     Has been advised to continue the current BiPAP, clean equipment frequently, and get new mask and supplies as allowed by insurance and DME. Recommend an earlier appointment, if significant treatment barriers develop.    Patients with AUGUSTIN are at increased risk of cardiovascular disease including coronary artery disease, systemic arterial hypertension, pulmonary arterial hypertension, cardiac arrythmias, and stroke.     Return in about 1 year (around 1/2/2026).      Please note portions of this record was created using voice recognition software. I have made every reasonable attempt to correct obvious errors, but I expect that there are errors of grammar and possibly content I did not discover before finalizing the note.

## 2025-03-11 DIAGNOSIS — E78.5 DYSLIPIDEMIA: Chronic | ICD-10-CM

## 2025-03-11 DIAGNOSIS — I10 ESSENTIAL HYPERTENSION: ICD-10-CM

## 2025-03-12 RX ORDER — ATORVASTATIN CALCIUM 10 MG/1
10 TABLET, FILM COATED ORAL
Qty: 90 TABLET | Refills: 0 | Status: SHIPPED | OUTPATIENT
Start: 2025-03-12

## 2025-03-12 RX ORDER — BENAZEPRIL HYDROCHLORIDE 40 MG/1
40 TABLET ORAL
Qty: 90 TABLET | Refills: 0 | Status: SHIPPED | OUTPATIENT
Start: 2025-03-12

## 2025-03-12 NOTE — TELEPHONE ENCOUNTER
Is the patient due for a refill? Yes    Was the patient seen the last 15 months? Yes    Date of last office visit: 9/17/2024    Does the patient have an upcoming appointment?  No       Provider to refill:DS    Does the patient have [facility] and need 100-day supply? (This applies to ALL medications) Patient does not have SCP

## 2025-03-13 ENCOUNTER — APPOINTMENT (OUTPATIENT)
Dept: URBAN - METROPOLITAN AREA CLINIC 22 | Facility: CLINIC | Age: 70
Setting detail: DERMATOLOGY
End: 2025-03-13

## 2025-03-13 DIAGNOSIS — L81.4 OTHER MELANIN HYPERPIGMENTATION: ICD-10-CM

## 2025-03-13 DIAGNOSIS — L82.1 OTHER SEBORRHEIC KERATOSIS: ICD-10-CM

## 2025-03-13 DIAGNOSIS — Z71.89 OTHER SPECIFIED COUNSELING: ICD-10-CM

## 2025-03-13 DIAGNOSIS — D22 MELANOCYTIC NEVI: ICD-10-CM

## 2025-03-13 PROBLEM — D22.5 MELANOCYTIC NEVI OF TRUNK: Status: ACTIVE | Noted: 2025-03-13

## 2025-03-13 PROCEDURE — ? COUNSELING

## 2025-03-13 PROCEDURE — 99213 OFFICE O/P EST LOW 20 MIN: CPT

## 2025-03-13 PROCEDURE — ? SUNSCREEN RECOMMENDATIONS

## 2025-03-13 ASSESSMENT — LOCATION DETAILED DESCRIPTION DERM
LOCATION DETAILED: STERNUM
LOCATION DETAILED: RIGHT POSTERIOR SHOULDER
LOCATION DETAILED: INFERIOR MID FOREHEAD
LOCATION DETAILED: RIGHT VENTRAL PROXIMAL FOREARM
LOCATION DETAILED: INFERIOR THORACIC SPINE
LOCATION DETAILED: LEFT VENTRAL PROXIMAL FOREARM
LOCATION DETAILED: LEFT POSTERIOR SHOULDER
LOCATION DETAILED: RIGHT MEDIAL UPPER BACK
LOCATION DETAILED: EPIGASTRIC SKIN

## 2025-03-13 ASSESSMENT — LOCATION ZONE DERM
LOCATION ZONE: FACE
LOCATION ZONE: ARM
LOCATION ZONE: TRUNK

## 2025-03-13 ASSESSMENT — LOCATION SIMPLE DESCRIPTION DERM
LOCATION SIMPLE: RIGHT FOREARM
LOCATION SIMPLE: UPPER BACK
LOCATION SIMPLE: RIGHT SHOULDER
LOCATION SIMPLE: LEFT FOREARM
LOCATION SIMPLE: INFERIOR FOREHEAD
LOCATION SIMPLE: RIGHT UPPER BACK
LOCATION SIMPLE: LEFT SHOULDER
LOCATION SIMPLE: CHEST
LOCATION SIMPLE: ABDOMEN

## 2025-03-13 NOTE — PROCEDURE: SUNSCREEN RECOMMENDATIONS

## 2025-04-17 NOTE — PROGRESS NOTES
1. Attempt #:Final    2. HealthConnect Verified: yes    3. Verify PCP: yes    4. Review Care Team: yes    5. WebIZ Checked & Epic Updated: Yes  · WebIZ Recommendations: FLU, PREVNAR (PCV13) , TD and SHINGRIX (Shingles)  · Is patient due for Tdap? NO  · Is patient due for Shingles? YES. Patient was not notified of copay/out of pocket cost.    6. Reviewed/Updated the following with patient:       •   Communication Preference Obtained? YES       •   Preferred Pharmacy? YES       •   Preferred Lab? YES       •   Family History (document living status of immediate family members and if + hx of cancer, diabetes, hypertension, hyperlipidemia, heart attack, stroke) YES. Was Abstract Encounter opened and chart updated? YES    7. Annual Wellness Visit Scheduling  · Scheduling Status:Scheduled     8. Care Coordination Enrollment (Attempt to Enroll in Care Coordination)  ·      9. Care Gap Scheduling (Attempt to Schedule EACH Overdue Care Gap!)     Health Maintenance Due   Topic Date Due   • IMM ZOSTER VACCINES (1 of 2) 03/10/2003 / Pt stated that would like to get vaccine, if available the day of his appt.   • COLONOSCOPY  01/05/2018 / Prefers to discuss with PCP first.   • IMM PNEUMOCOCCAL 65+ (ADULT) LOW/MEDIUM RISK SERIES (1 of 2 - PCV13) 03/10/2018 / Scheduled   • IMM INFLUENZA (1) 09/01/2018 / Scheduled       10. [employer] Activation: already active    11. [employer] Karma: yes    12. Virtual Visits: no    13. Opt In to Text Messages: no    14. Patient was not advised: “This is a free wellness visit. The provider will screen for medical conditions to help you stay healthy. If you have other concerns to address you may be asked to discuss these at a separate visit or there may be an additional fee.”     15. Patient was NOT informed to arrive 15 min prior to their scheduled appointment and bring in their medication bottles.   Detail Level: Simple Detail Level: Detailed

## 2025-04-18 DIAGNOSIS — F33.1 MODERATE EPISODE OF RECURRENT MAJOR DEPRESSIVE DISORDER (HCC): ICD-10-CM

## 2025-04-21 RX ORDER — ESCITALOPRAM OXALATE 10 MG/1
10 TABLET ORAL DAILY
Qty: 90 TABLET | Refills: 0 | Status: SHIPPED | OUTPATIENT
Start: 2025-04-21

## 2025-04-21 RX ORDER — ESCITALOPRAM OXALATE 20 MG/1
20 TABLET ORAL DAILY
Qty: 90 TABLET | Refills: 0 | Status: SHIPPED | OUTPATIENT
Start: 2025-04-21

## 2025-05-01 ENCOUNTER — APPOINTMENT (OUTPATIENT)
Dept: MEDICAL GROUP | Facility: PHYSICIAN GROUP | Age: 70
End: 2025-05-01
Payer: MEDICARE

## 2025-05-01 VITALS
WEIGHT: 253.13 LBS | TEMPERATURE: 97.9 F | HEIGHT: 71 IN | DIASTOLIC BLOOD PRESSURE: 64 MMHG | OXYGEN SATURATION: 96 % | BODY MASS INDEX: 35.44 KG/M2 | RESPIRATION RATE: 18 BRPM | SYSTOLIC BLOOD PRESSURE: 112 MMHG | HEART RATE: 70 BPM

## 2025-05-01 DIAGNOSIS — F33.1 MODERATE EPISODE OF RECURRENT MAJOR DEPRESSIVE DISORDER (HCC): ICD-10-CM

## 2025-05-01 DIAGNOSIS — D64.9 ANEMIA, UNSPECIFIED TYPE: ICD-10-CM

## 2025-05-01 DIAGNOSIS — R07.89 CHEST DISCOMFORT: ICD-10-CM

## 2025-05-01 DIAGNOSIS — K22.719 BARRETT'S ESOPHAGUS WITH DYSPLASIA: ICD-10-CM

## 2025-05-01 DIAGNOSIS — N40.1 BENIGN PROSTATIC HYPERPLASIA WITH URINARY FREQUENCY: Chronic | ICD-10-CM

## 2025-05-01 DIAGNOSIS — I10 ESSENTIAL HYPERTENSION: ICD-10-CM

## 2025-05-01 DIAGNOSIS — G56.91 NEUROPATHY OF HAND, RIGHT: ICD-10-CM

## 2025-05-01 DIAGNOSIS — Z12.5 PROSTATE CANCER SCREENING: ICD-10-CM

## 2025-05-01 DIAGNOSIS — R35.0 BENIGN PROSTATIC HYPERPLASIA WITH URINARY FREQUENCY: Chronic | ICD-10-CM

## 2025-05-01 DIAGNOSIS — E87.1 HYPONATREMIA: ICD-10-CM

## 2025-05-01 PROCEDURE — 3074F SYST BP LT 130 MM HG: CPT

## 2025-05-01 PROCEDURE — 3078F DIAST BP <80 MM HG: CPT

## 2025-05-01 PROCEDURE — 99214 OFFICE O/P EST MOD 30 MIN: CPT

## 2025-05-01 RX ORDER — ESCITALOPRAM OXALATE 20 MG/1
20 TABLET ORAL DAILY
Qty: 30 TABLET | Refills: 11 | Status: SHIPPED | OUTPATIENT
Start: 2025-05-01

## 2025-05-01 RX ORDER — OMEPRAZOLE 20 MG/1
20 CAPSULE, DELAYED RELEASE ORAL DAILY
Qty: 90 CAPSULE | Refills: 3 | Status: CANCELLED | OUTPATIENT
Start: 2025-05-01

## 2025-05-01 RX ORDER — CARVEDILOL 6.25 MG/1
6.25 TABLET ORAL 2 TIMES DAILY WITH MEALS
Qty: 60 TABLET | Refills: 0 | Status: SHIPPED | OUTPATIENT
Start: 2025-05-01 | End: 2025-05-29 | Stop reason: SDUPTHER

## 2025-05-01 RX ORDER — ESCITALOPRAM OXALATE 10 MG/1
10 TABLET ORAL DAILY
Qty: 30 TABLET | Refills: 11 | Status: SHIPPED | OUTPATIENT
Start: 2025-05-01

## 2025-05-01 ASSESSMENT — FIBROSIS 4 INDEX: FIB4 SCORE: 1.04

## 2025-05-01 NOTE — PROGRESS NOTES
Subjective:     Chief Complaint   Patient presents with    Medication Refill     History of Present Illness  The patient is a 70-year-old male here for a follow-up on medication refills and to discuss abnormal lab results.    Low sodium levels and a slightly low blood count were noted in recent lab results.  Denies any change in mental status.  New labs will be ordered to reassess these values before his upcoming appointment with Dr. Davalos later this month.    Intermittent chest pain has been occurring both at rest and during activity. The pain is transient and does not persist. On one occasion, significant pressure was experienced, which was attributed to indigestion. No associated shortness of breath or radiating pain down the arm or back is reported. Occasional tingling sensations in the hands, predominantly on the right side, are noted, and there is uncertainty if this could be neuropathy. These symptoms have been present for several months without any other associated symptoms.    A fall occurred at his son's house yesterday due to tripping over a box, but no injuries were sustained. He did not hit his head and managed to catch himself, ending up on all fours. No pain is reported following the incident.    A stainless-steel disk was placed in his neck by an orthopedic specialist in [place] a few years ago. This procedure was performed to address similar symptoms experienced before the disk was placed.    Currently, he is on a regimen of Lexapro 30 mg and requires a refill. A preference for a 30-day supply is expressed due to insurance coverage issues. Prilosec is also taken, which is refilled by his gastroenterologist. The last consultation with a gastroenterologist was approximately 3 years ago, during which a colonoscopy and endoscopy were performed. The recommended follow-up interval is uncertain but recalled to be either 5 or 10 years. Interest in having the prostate level checked again this year is  "expressed.    Previously, Wegovy was prescribed by Dr. Davalos but discontinued due to difficulty in obtaining the medication.    PAST SURGICAL HISTORY:  Stainless-steel disk placement in the neck.    Allergies: Patient has no known allergies.  ROS per HPI  Health Maintenance: Deferred   Objective:     /64 (BP Location: Left arm, Patient Position: Sitting, BP Cuff Size: Adult)   Pulse 70   Temp 36.6 °C (97.9 °F) (Temporal)   Resp 18   Ht 1.803 m (5' 11\")   Wt 115 kg (253 lb 2 oz)   SpO2 96%   BMI 35.30 kg/m²  Body mass index is 35.3 kg/m².     Physical Exam  Vitals reviewed.   Constitutional:       General: He is not in acute distress.     Appearance: Normal appearance. He is not ill-appearing.   Cardiovascular:      Rate and Rhythm: Normal rate and regular rhythm.      Heart sounds: Normal heart sounds.   Pulmonary:      Effort: Pulmonary effort is normal. No respiratory distress.      Breath sounds: Normal breath sounds.   Skin:     Coloration: Skin is not jaundiced or pale.   Neurological:      General: No focal deficit present.      Mental Status: He is alert and oriented to person, place, and time.   Psychiatric:         Mood and Affect: Mood normal.         Behavior: Behavior normal.         Thought Content: Thought content normal.         Judgment: Judgment normal.        Results  Labs   - Sodium levels: Low   - Blood count: Slightly low   - Prostate level: , Normal    Results for orders placed or performed in visit on 24   EKG    Collection Time: 24  3:00 PM   Result Value Ref Range    Report       [facility]    Test Date:  2024  Pt Name:    RAFIA CALDWELL                Department: [facility]  MRN:        [id number]                      Room:  Gender:     Male                         Technician:   :        [id number]                   Requested By:NAGA DAVALOS  Order #:    [id number]                    Reading MD: Naga Davalos MD    Measurements  Intervals        "                         Axis  Rate:       75                           P:          27  ID:         [id number]                          QRS:        53  QRSD:       98                           T:          59  QT:         405  QTc:        453    Interpretive Statements  Sinus rhythm  Atrial premature complex  Low voltage, precordial leads  Compared to ECG 03/12/2024 15:10:37  Atrial premature complex(es) now present  Electronically Signed On 09- 15:20:06 PDT by Naga Davalos MD        Assessment and Plan:     The following treatment plan was discussed through shared decision making with the patient:    1. Hyponatremia  Basic Metabolic Panel      2. Anemia, unspecified type  CBC WITH DIFFERENTIAL      3. Moderate episode of recurrent major depressive disorder (HCC)  escitalopram (LEXAPRO) 10 MG Tab    escitalopram (LEXAPRO) 20 MG tablet      4. Pro's esophagus with dysplasia        5. Essential hypertension  carvedilol (COREG) 6.25 MG Tab      6. Benign prostatic hyperplasia with urinary frequency  PROSTATE SPECIFIC AG DIAGNOSTIC      7. Prostate cancer screening  PROSTATE SPECIFIC AG DIAGNOSTIC      8. Chest discomfort        9. Neuropathy of hand, right          Assessment & Plan  1. Hyponatremia.  - Sodium levels were found to be low in the last lab results.  - Anxiety medications may be causing electrolyte imbalances.  - A basic metabolic panel will be ordered to check kidney function, liver function, and electrolytes.  - Monitoring of sodium levels will be conducted before his appointment with Dr. Davalos later this month.    2. Anemia.  - Blood count was slightly low, which is concerning given his use of blood thinners.  - Previous lab results showed a slight decrease in red blood cells.  - A recheck of his blood count will be conducted to monitor for any improvements or further decreases in red blood cells.  - Monitoring of blood count will be conducted before his appointment with Dr. Davalos later this  month.    3. Medication Management.  - Currently on Lexapro 30 mg and requires a refill.  - Prescription for a 30-day supply of Lexapro with 11 refills will be provided due to insurance coverage issues.  - A 30-day supply of Coreg (carvedilol) will be refilled.  - Advised to request additional refills from Dr. Davalos during his upcoming appointment of other cardiac medications    4. Chest Discomfort.  - Reports experiencing random chest discomfort a couple of times a night, sometimes at rest.  - Pain does not last long and does not radiate.  - Advised to monitor the symptoms and report any changes or associated symptoms such as shortness of breath or radiating pain.    5. Neuropathy.  - Reports occasional tingling sensations in his hands, more on the right side.  - Symptoms could be related to previous neck surgery where a stainless-steel disk was placed.  - Advised to monitor the symptoms and report any changes.  - Declines further work up at this time    Health Maintenance.  - Prostate level was normal when checked last year.  - Advised that prostate levels are typically checked every 2 years, but can opt for annual checks if preferred.  - PSA test will be added to his lab orders for this year.  - Monitoring of prostate levels will be conducted with the upcoming lab tests.    Return in about 3 months (around 8/1/2025) for Labs, Med Check.       Please note that this note was created using dictation with voice recognition software. I have made every reasonable attempt to correct obvious errors, but I expect that there are errors of grammar and possibly content that I did not discover before finalizing the note.    DIANA Samuel  [facility]  [facility]

## 2025-05-08 ENCOUNTER — HOSPITAL ENCOUNTER (OUTPATIENT)
Dept: LAB | Facility: MEDICAL CENTER | Age: 70
End: 2025-05-08
Payer: MEDICARE

## 2025-05-08 DIAGNOSIS — D64.9 ANEMIA, UNSPECIFIED TYPE: ICD-10-CM

## 2025-05-08 DIAGNOSIS — R35.0 BENIGN PROSTATIC HYPERPLASIA WITH URINARY FREQUENCY: Chronic | ICD-10-CM

## 2025-05-08 DIAGNOSIS — Z12.5 PROSTATE CANCER SCREENING: ICD-10-CM

## 2025-05-08 DIAGNOSIS — E87.1 HYPONATREMIA: ICD-10-CM

## 2025-05-08 DIAGNOSIS — N40.1 BENIGN PROSTATIC HYPERPLASIA WITH URINARY FREQUENCY: Chronic | ICD-10-CM

## 2025-05-08 LAB
BASOPHILS # BLD AUTO: 0.7 % (ref 0–1.8)
BASOPHILS # BLD: 0.05 K/UL (ref 0–0.12)
EOSINOPHIL # BLD AUTO: 0.13 K/UL (ref 0–0.51)
EOSINOPHIL NFR BLD: 1.9 % (ref 0–6.9)
ERYTHROCYTE [DISTWIDTH] IN BLOOD BY AUTOMATED COUNT: 43.8 FL (ref 35.9–50)
HCT VFR BLD AUTO: 40 % (ref 42–52)
HGB BLD-MCNC: 13.1 G/DL (ref 14–18)
IMM GRANULOCYTES # BLD AUTO: 0.05 K/UL (ref 0–0.11)
IMM GRANULOCYTES NFR BLD AUTO: 0.7 % (ref 0–0.9)
LYMPHOCYTES # BLD AUTO: 1.01 K/UL (ref 1–4.8)
LYMPHOCYTES NFR BLD: 15.1 % (ref 22–41)
MCH RBC QN AUTO: 32.3 PG (ref 27–33)
MCHC RBC AUTO-ENTMCNC: 32.8 G/DL (ref 32.3–36.5)
MCV RBC AUTO: 98.5 FL (ref 81.4–97.8)
MONOCYTES # BLD AUTO: 0.68 K/UL (ref 0–0.85)
MONOCYTES NFR BLD AUTO: 10.2 % (ref 0–13.4)
NEUTROPHILS # BLD AUTO: 4.75 K/UL (ref 1.82–7.42)
NEUTROPHILS NFR BLD: 71.4 % (ref 44–72)
NRBC # BLD AUTO: 0 K/UL
NRBC BLD-RTO: 0 /100 WBC (ref 0–0.2)
PLATELET # BLD AUTO: 273 K/UL (ref 164–446)
PMV BLD AUTO: 9.9 FL (ref 9–12.9)
RBC # BLD AUTO: 4.06 M/UL (ref 4.7–6.1)
WBC # BLD AUTO: 6.7 K/UL (ref 4.8–10.8)

## 2025-05-08 PROCEDURE — 85025 COMPLETE CBC W/AUTO DIFF WBC: CPT

## 2025-05-08 PROCEDURE — 80048 BASIC METABOLIC PNL TOTAL CA: CPT

## 2025-05-08 PROCEDURE — 84153 ASSAY OF PSA TOTAL: CPT

## 2025-05-08 PROCEDURE — 36415 COLL VENOUS BLD VENIPUNCTURE: CPT

## 2025-05-09 LAB
ANION GAP SERPL CALC-SCNC: 9 MMOL/L (ref 7–16)
BUN SERPL-MCNC: 13 MG/DL (ref 8–22)
CALCIUM SERPL-MCNC: 9.5 MG/DL (ref 8.5–10.5)
CHLORIDE SERPL-SCNC: 98 MMOL/L (ref 96–112)
CO2 SERPL-SCNC: 23 MMOL/L (ref 20–33)
CREAT SERPL-MCNC: 1.04 MG/DL (ref 0.5–1.4)
GFR SERPLBLD CREATININE-BSD FMLA CKD-EPI: 76 ML/MIN/1.73 M 2
GLUCOSE SERPL-MCNC: 103 MG/DL (ref 65–99)
POTASSIUM SERPL-SCNC: 4.9 MMOL/L (ref 3.6–5.5)
PSA SERPL DL<=0.01 NG/ML-MCNC: 1.7 NG/ML (ref 0–4)
SODIUM SERPL-SCNC: 130 MMOL/L (ref 135–145)

## 2025-05-23 ENCOUNTER — RESULTS FOLLOW-UP (OUTPATIENT)
Dept: MEDICAL GROUP | Facility: PHYSICIAN GROUP | Age: 70
End: 2025-05-23

## 2025-05-29 ENCOUNTER — OFFICE VISIT (OUTPATIENT)
Dept: CARDIOLOGY | Facility: MEDICAL CENTER | Age: 70
End: 2025-05-29
Attending: INTERNAL MEDICINE
Payer: MEDICARE

## 2025-05-29 ENCOUNTER — TELEPHONE (OUTPATIENT)
Dept: CARDIOLOGY | Facility: MEDICAL CENTER | Age: 70
End: 2025-05-29
Payer: MEDICARE

## 2025-05-29 VITALS
DIASTOLIC BLOOD PRESSURE: 70 MMHG | RESPIRATION RATE: 16 BRPM | HEART RATE: 63 BPM | HEIGHT: 71 IN | WEIGHT: 249 LBS | OXYGEN SATURATION: 95 % | BODY MASS INDEX: 34.86 KG/M2 | SYSTOLIC BLOOD PRESSURE: 116 MMHG

## 2025-05-29 DIAGNOSIS — I10 ESSENTIAL HYPERTENSION: ICD-10-CM

## 2025-05-29 DIAGNOSIS — F10.90 ALCOHOL USE: ICD-10-CM

## 2025-05-29 DIAGNOSIS — E78.5 DYSLIPIDEMIA: Chronic | ICD-10-CM

## 2025-05-29 DIAGNOSIS — Z98.890 S/P ABLATION OF ATRIAL FIBRILLATION: ICD-10-CM

## 2025-05-29 DIAGNOSIS — Z79.01 ANTICOAGULATED: ICD-10-CM

## 2025-05-29 DIAGNOSIS — E66.09 CLASS 1 OBESITY DUE TO EXCESS CALORIES WITHOUT SERIOUS COMORBIDITY WITH BODY MASS INDEX (BMI) OF 33.0 TO 33.9 IN ADULT: ICD-10-CM

## 2025-05-29 DIAGNOSIS — D68.69 SECONDARY HYPERCOAGULABLE STATE (HCC): ICD-10-CM

## 2025-05-29 DIAGNOSIS — I48.0 PAROXYSMAL ATRIAL FIBRILLATION (HCC): Primary | ICD-10-CM

## 2025-05-29 DIAGNOSIS — Z86.79 S/P ABLATION OF ATRIAL FIBRILLATION: ICD-10-CM

## 2025-05-29 DIAGNOSIS — G47.33 OBSTRUCTIVE SLEEP APNEA SYNDROME: ICD-10-CM

## 2025-05-29 DIAGNOSIS — E66.811 CLASS 1 OBESITY DUE TO EXCESS CALORIES WITHOUT SERIOUS COMORBIDITY WITH BODY MASS INDEX (BMI) OF 33.0 TO 33.9 IN ADULT: ICD-10-CM

## 2025-05-29 LAB — EKG IMPRESSION: NORMAL

## 2025-05-29 PROCEDURE — 93005 ELECTROCARDIOGRAM TRACING: CPT | Mod: TC | Performed by: INTERNAL MEDICINE

## 2025-05-29 PROCEDURE — 99212 OFFICE O/P EST SF 10 MIN: CPT | Performed by: INTERNAL MEDICINE

## 2025-05-29 RX ORDER — CARVEDILOL 6.25 MG/1
6.25 TABLET ORAL 2 TIMES DAILY WITH MEALS
Qty: 180 TABLET | Refills: 3 | Status: SHIPPED | OUTPATIENT
Start: 2025-05-29 | End: 2025-06-28

## 2025-05-29 RX ORDER — APIXABAN 5 MG/1
5 TABLET, FILM COATED ORAL 2 TIMES DAILY
Qty: 180 TABLET | Refills: 3 | Status: SHIPPED | OUTPATIENT
Start: 2025-05-29

## 2025-05-29 ASSESSMENT — FIBROSIS 4 INDEX: FIB4 SCORE: 1.07

## 2025-05-29 NOTE — PROGRESS NOTES
Chief Complaint   Patient presents with    Atrial Fibrillation     F/v Dx:Paroxysmal atrial fibrillation (HCC)       Subjective     Dean Nobles is a 70 y.o. male who presents today with previous atrial fibrillation PAF.  Status post ablation.  No significant symptoms.  Obesity.  Some excess alcohol.  Sleep apnea treated.  Hypertension    Past Medical History[1]  Past Surgical History[2]  Family History   Problem Relation Age of Onset    Hyperlipidemia Mother     Heart Disease Mother          of heart attack at age of 80    Diabetes Mother         Type 2    Cancer Father         skin    Other Father         liver cirrhosis    Diabetes Sister     Hypertension Sister     Kidney Disease Sister     Diabetes Sister     Hypertension Sister     Other Sister         Kidney failure    Hyperlipidemia Brother     Heart Disease Brother         arrhysmia    Atrial fibrillation Brother     Sleep Apnea Brother     Ovarian Cancer Neg Hx     Tubal Cancer Neg Hx     Peritoneal Cancer Neg Hx     Colorectal Cancer Neg Hx     Breast Cancer Neg Hx     Stroke Neg Hx      Social History     Socioeconomic History    Marital status:      Spouse name: Not on file    Number of children: Not on file    Years of education: Not on file    Highest education level: Bachelor's degree (e.g., BA, AB, BS)   Occupational History    Not on file   Tobacco Use    Smoking status: Former     Current packs/day: 0.00     Average packs/day: 2.0 packs/day for 15.0 years (30.0 ttl pk-yrs)     Types: Cigarettes     Start date:      Quit date:      Years since quittin.4    Smokeless tobacco: Never    Tobacco comments:     16-30   Vaping Use    Vaping status: Never Used   Substance and Sexual Activity    Alcohol use: Yes     Alcohol/week: 12.6 oz     Types: 21 Glasses of wine per week     Comment: daily vodka - 4-5 cocktailes or wine - 3 glasses    Drug use: No    Sexual activity: Yes     Partners: Female   Other Topics Concern    Not  "on file   Social History Narrative    Retired [employer], [employer] and all around the country.      Social Drivers of Health     Financial Resource Strain: Low Risk  (11/12/2023)    Overall Financial Resource Strain (CARDIA)     Difficulty of Paying Living Expenses: Not very hard   Food Insecurity: No Food Insecurity (11/12/2023)    Hunger Vital Sign     Worried About Running Out of Food in the Last Year: Never true     Ran Out of Food in the Last Year: Never true   Transportation Needs: Unknown (11/12/2023)    PRAPARE - Transportation     Lack of Transportation (Medical): Not on file     Lack of Transportation (Non-Medical): No   Physical Activity: Inactive (11/12/2023)    Exercise Vital Sign     Days of Exercise per Week: 0 days     Minutes of Exercise per Session: 0 min   Stress: No Stress Concern Present (11/12/2023)    [employer] [place] [employer] - Occupational Stress Questionnaire     Feeling of Stress : Not at all   Social Connections: Moderately Isolated (11/12/2023)    Social Connection and Isolation Panel [NHANES]     Frequency of Communication with Friends and Family: Three times a week     Frequency of Social Gatherings with Friends and Family: Once a week     Attends Bahai Services: Never     Active Member of Clubs or Organizations: No     Attends Club or Organization Meetings: Never     Marital Status:    Intimate Partner Violence: Not on file   Housing Stability: Low Risk  (11/12/2023)    Housing Stability Vital Sign     Unable to Pay for Housing in the Last Year: No     Number of Places Lived in the Last Year: 2     Unstable Housing in the Last Year: No     Allergies[3]  Encounter Medications[4]  ROS           Objective     /70 (BP Location: Left arm, Patient Position: Sitting, BP Cuff Size: Adult)   Pulse 63   Resp 16   Ht 1.803 m (5' 11\")   Wt 113 kg (249 lb)   SpO2 95%   BMI 34.73 kg/m²     Physical Exam  Constitutional:       Appearance: He is " well-developed. He is obese.   HENT:      Head: Normocephalic and atraumatic.   Cardiovascular:      Rate and Rhythm: Normal rate and regular rhythm.      Heart sounds: No murmur heard.     No friction rub. No gallop.   Pulmonary:      Effort: Pulmonary effort is normal.      Breath sounds: Normal breath sounds.   Abdominal:      Palpations: Abdomen is soft.   Musculoskeletal:         General: Normal range of motion.      Cervical back: Normal range of motion and neck supple.   Skin:     General: Skin is warm and dry.   Neurological:      Mental Status: He is alert and oriented to person, place, and time.   Psychiatric:         Behavior: Behavior normal.         Thought Content: Thought content normal.         Judgment: Judgment normal.                Assessment & Plan     1. Paroxysmal atrial fibrillation (HCC)  EKG    ELIQUIS 5 MG Tab      2. S/P ablation of atrial fibrillation        3. Secondary hypercoagulable state (HCC)        4. Obstructive sleep apnea syndrome        5. Dyslipidemia        6. Essential hypertension  carvedilol (COREG) 6.25 MG Tab      7. Alcohol use        8. Anticoagulated        9. Class 1 obesity due to excess calories without serious comorbidity with body mass index (BMI) of 33.0 to 33.9 in adult            Medical Decision Making: Today's Assessment/Status/Plan:   1.  Obesity.  See about getting a GLP-1 agonist approved.  2.  Atrial fibrillation clinically stable.  3.  Anticoagulation continue DOAC.  4.  Work on exercise.  5.  Decrease alcohol intake.  States drinks 3 drinks per day.  6.  Hyperlipidemia continue statins.  7.  Follow-up with the EP APN in 6 months follow-up in 1 year.                          [1]   Past Medical History:  Diagnosis Date    ADD (attention deficit disorder) 01/05/2012    Arrhythmia     Atrial fibrillation (HCC)     BPH (benign prostatic hyperplasia) 01/05/2012    Chickenpox     Depression     Dyslipidemia 01/05/2012    ED (erectile dysfunction) 01/05/2012     Frequent urination     Greek measles     Heartburn     HTN (hypertension), benign 01/05/2012    Hypertension     Influenza     Mumps     Rheumatic fever     Shortness of breath     Sleep apnea 2023    awaiting cpap    Swelling of lower extremity     Vision loss     Wears glasses    [2]   Past Surgical History:  Procedure Laterality Date    EYE SURGERY      12/3/21 Lazer surgery to releive pressure     LAMINOTOMY     [3] No Known Allergies  [4]   Outpatient Encounter Medications as of 5/29/2025   Medication Sig Dispense Refill    ELIQUIS 5 MG Tab Take 1 Tablet by mouth 2 times a day. 180 Tablet 3    carvedilol (COREG) 6.25 MG Tab Take 1 Tablet by mouth 2 times a day with meals for 30 days. 180 Tablet 3    escitalopram (LEXAPRO) 10 MG Tab Take 1 Tablet by mouth every day. 30 Tablet 11    escitalopram (LEXAPRO) 20 MG tablet Take 1 Tablet by mouth every day. 30 Tablet 11    atorvastatin (LIPITOR) 10 MG Tab TAKE ONE TABLET BY MOUTH EVERY DAY 90 Tablet 0    benazepril (LOTENSIN) 40 MG tablet TAKE ONE TABLET BY MOUTH EVERY DAY 90 Tablet 0    spironolactone (ALDACTONE) 50 MG Tab Take 1 Tablet by mouth every day. 90 Tablet 3    omeprazole (PRILOSEC) 20 MG delayed-release capsule Take 1 Capsule by mouth every day. 90 Capsule 3    tadalafil (CIALIS) 10 MG tablet Take 1 Tablet by mouth as needed for Erectile Dysfunction. 10 Tablet 3    latanoprost (XALATAN) 0.005 % Solution Administer 1 Drop into both eyes every day.      timolol (TIMOPTIC) 0.5 % Solution Administer 1 Drop into both eyes every day.      [DISCONTINUED] carvedilol (COREG) 6.25 MG Tab Take 1 Tablet by mouth 2 times a day with meals for 30 days. 60 Tablet 0    [DISCONTINUED] Semaglutide (WEGOVY) 0.5 MG/0.5ML Solution Auto-injector Pen-injector Inject 1 mL under the skin every 7 days. (Patient not taking: Reported on 5/29/2025) 4 Each 3    [DISCONTINUED] ELIQUIS 5 MG Tab Take 1 Tablet by mouth 2 times a day. 180 Tablet 3     No facility-administered encounter  medications on file as of 5/29/2025.

## 2025-06-04 ENCOUNTER — TELEPHONE (OUTPATIENT)
Dept: CARDIOLOGY | Facility: MEDICAL CENTER | Age: 70
End: 2025-06-04
Payer: MEDICARE

## 2025-06-04 PROCEDURE — RXMED WILLOW AMBULATORY MEDICATION CHARGE: Performed by: INTERNAL MEDICINE

## 2025-06-04 NOTE — TELEPHONE ENCOUNTER
Received Renewal PA request via [facility]  for Eliquis 5 mg tab . (Quantity:180, Day Supply:90)     Insurance: Hank  Member ID:  [id number]  BIN: [id number]  [facility]  Group: [id number]     Ran Test claim via [place] & medication Copay $716.76 90 days 30 days $ 514.48    I called pt to offer 340 B. Pt would like to fill at [facility] pharmacy and transfer all his medication there. He also asked about GLP1 and he would like a Rx for Ozempic and pay the 340 B price. He would like to get calls for refills for the first few times and them may go to auto fill and delivery.

## 2025-06-04 NOTE — TELEPHONE ENCOUNTER
Medication: Eliquis 5 mg tab   Type of Insurance: Government funded (Medicare/Medicare Advantage)  Type of Financial assistance requested Foundation  Source: 340B  Source Phone #: [phone]  Outcome: approved  Effective dates: 6/4/25 until 12/31/25  Details/Billing Information:     Final Copay: $180.78

## 2025-06-05 ENCOUNTER — PHARMACY VISIT (OUTPATIENT)
Dept: PHARMACY | Facility: MEDICAL CENTER | Age: 70
End: 2025-06-05
Payer: COMMERCIAL

## 2025-06-12 ENCOUNTER — TELEPHONE (OUTPATIENT)
Dept: CARDIOLOGY | Facility: MEDICAL CENTER | Age: 70
End: 2025-06-12
Payer: MEDICARE

## 2025-06-12 NOTE — TELEPHONE ENCOUNTER
Received New Start PA request via [facility]  for Ozempic 0.25 or 0.5mg . (Quantity:3, Day Supply:28)     Insurance: [employer]  Member ID:  [id number]  BIN: [id number]  PCN: [facility]  Group: [id number]     Ran Test claim via [place] & medication received message from provider to see what GPL1 is covered. No GLP1 was covered. So I called pt and asked if he could afford 340 b for Ozempic and he said he would like to try it.. I sent message to provider asking for Rx.

## 2025-06-12 NOTE — TELEPHONE ENCOUNTER
Medication:   Ozempic 0.25 or 0.5mg        Type of Insurance: Government funded (Medicare/Medicare Advantage)  Type of Financial assistance requested Foundation  Source: 340 B  Source Phone #: [phone]  Outcome: Approved  Effective dates: 6/12/25 until 12/31/25  Details/Billing Information:       Final Copay: $356.81    I called pt to set up delivery but had to leave voice mail

## 2025-06-13 NOTE — TELEPHONE ENCOUNTER
Caller: Dean Nobles    Topic/issue:   Dean is returning your call.     Callback Number:   [phone]    Thank you,   Carmel DUBOIS

## 2025-06-16 DIAGNOSIS — Z79.899 HIGH RISK MEDICATION USE: Primary | ICD-10-CM

## 2025-06-16 DIAGNOSIS — I10 ESSENTIAL HYPERTENSION: ICD-10-CM

## 2025-06-16 PROCEDURE — RXMED WILLOW AMBULATORY MEDICATION CHARGE: Performed by: NURSE PRACTITIONER

## 2025-06-16 PROCEDURE — RXMED WILLOW AMBULATORY MEDICATION CHARGE: Performed by: INTERNAL MEDICINE

## 2025-06-16 RX ORDER — BENAZEPRIL HYDROCHLORIDE 40 MG/1
40 TABLET ORAL
Qty: 90 TABLET | Refills: 3 | Status: SHIPPED | OUTPATIENT
Start: 2025-06-16

## 2025-06-16 NOTE — TELEPHONE ENCOUNTER
Received request via: Patient    Was the patient seen in the last year in this department? Yes    Does the patient have an active prescription (recently filled or refills available) for medication(s) requested? No    Ph[facility] Name: [facility]    Does the patient have half-way Plus and need 100-day supply? (This applies to ALL medications) Patient does not have SCP

## 2025-06-16 NOTE — TELEPHONE ENCOUNTER
Is the patient due for a refill? Yes    Was the patient seen the last 15 months? Yes    Date of last office visit: 05/29/2025    Does the patient have an upcoming appointment?  Yes   If yes, When? 12/03/2025    Provider to refill:DS    Does the patient have USP Plus and need 100-day supply? (This applies to ALL medications) Patient does not have SCP

## 2025-06-17 ENCOUNTER — PHARMACY VISIT (OUTPATIENT)
Dept: PHARMACY | Facility: MEDICAL CENTER | Age: 70
End: 2025-06-17
Payer: COMMERCIAL

## 2025-06-17 PROCEDURE — RXMED WILLOW AMBULATORY MEDICATION CHARGE

## 2025-06-17 PROCEDURE — RXMED WILLOW AMBULATORY MEDICATION CHARGE: Performed by: NURSE PRACTITIONER

## 2025-06-17 RX ORDER — LATANOPROST 50 UG/ML
1 SOLUTION/ DROPS OPHTHALMIC DAILY
Refills: 0 | OUTPATIENT
Start: 2025-06-17

## 2025-06-17 RX ORDER — OMEPRAZOLE 20 MG/1
20 CAPSULE, DELAYED RELEASE ORAL DAILY
Qty: 90 CAPSULE | Refills: 3 | Status: SHIPPED | OUTPATIENT
Start: 2025-06-17

## 2025-06-17 RX ORDER — SPIRONOLACTONE 50 MG/1
50 TABLET, FILM COATED ORAL DAILY
Qty: 90 TABLET | Refills: 0 | Status: SHIPPED | OUTPATIENT
Start: 2025-06-17

## 2025-06-17 NOTE — TELEPHONE ENCOUNTER
Received request via: Patient    Was the patient seen in the last year in this department? Yes    Does the patient have an active prescription (recently filled or refills available) for medication(s) requested? No    Ph[facility] Name: [facility]    Does the patient have FCI Plus and need 100-day supply? (This applies to ALL medications) Patient does not have SCP

## 2025-06-18 ENCOUNTER — PHARMACY VISIT (OUTPATIENT)
Dept: PHARMACY | Facility: MEDICAL CENTER | Age: 70
End: 2025-06-18
Payer: COMMERCIAL

## 2025-07-17 ENCOUNTER — HOSPITAL ENCOUNTER (OUTPATIENT)
Dept: LAB | Facility: MEDICAL CENTER | Age: 70
End: 2025-07-17
Attending: NURSE PRACTITIONER
Payer: MEDICARE

## 2025-07-17 DIAGNOSIS — I10 ESSENTIAL HYPERTENSION: ICD-10-CM

## 2025-07-17 DIAGNOSIS — Z79.899 HIGH RISK MEDICATION USE: ICD-10-CM

## 2025-07-17 LAB
ANION GAP SERPL CALC-SCNC: 10 MMOL/L (ref 7–16)
BUN SERPL-MCNC: 18 MG/DL (ref 8–22)
CALCIUM SERPL-MCNC: 9.3 MG/DL (ref 8.5–10.5)
CHLORIDE SERPL-SCNC: 97 MMOL/L (ref 96–112)
CO2 SERPL-SCNC: 23 MMOL/L (ref 20–33)
CREAT SERPL-MCNC: 0.98 MG/DL (ref 0.5–1.4)
FASTING STATUS PATIENT QL REPORTED: NORMAL
GFR SERPLBLD CREATININE-BSD FMLA CKD-EPI: 82 ML/MIN/1.73 M 2
GLUCOSE SERPL-MCNC: 99 MG/DL (ref 65–99)
POTASSIUM SERPL-SCNC: 5.4 MMOL/L (ref 3.6–5.5)
SODIUM SERPL-SCNC: 130 MMOL/L (ref 135–145)

## 2025-07-17 PROCEDURE — 36415 COLL VENOUS BLD VENIPUNCTURE: CPT

## 2025-07-17 PROCEDURE — 80048 BASIC METABOLIC PNL TOTAL CA: CPT

## 2025-07-18 ENCOUNTER — RESULTS FOLLOW-UP (OUTPATIENT)
Dept: CARDIOLOGY | Facility: MEDICAL CENTER | Age: 70
End: 2025-07-18
Payer: MEDICARE

## 2025-07-18 DIAGNOSIS — E87.1 HYPONATREMIA: Primary | ICD-10-CM

## 2025-07-18 DIAGNOSIS — F33.1 MODERATE EPISODE OF RECURRENT MAJOR DEPRESSIVE DISORDER (HCC): ICD-10-CM

## 2025-07-18 RX ORDER — ESCITALOPRAM OXALATE 20 MG/1
20 TABLET ORAL DAILY
Qty: 30 TABLET | Refills: 11 | Status: SHIPPED | OUTPATIENT
Start: 2025-07-18

## 2025-07-18 RX ORDER — ESCITALOPRAM OXALATE 10 MG/1
10 TABLET ORAL DAILY
Qty: 30 TABLET | Refills: 11 | Status: SHIPPED | OUTPATIENT
Start: 2025-07-18

## 2025-07-18 NOTE — TELEPHONE ENCOUNTER
Received request via: Patient    Was the patient seen in the last year in this department? Yes    Does the patient have an active prescription (recently filled or refills available) for medication(s) requested? No    [facility]    Does the patient have M[facility] Plus and need 100-day supply? (This applies to ALL medications) Patient does not have SCP

## 2025-07-29 ENCOUNTER — TELEPHONE (OUTPATIENT)
Dept: CARDIOLOGY | Facility: MEDICAL CENTER | Age: 70
End: 2025-07-29
Payer: MEDICARE

## 2025-07-29 DIAGNOSIS — E66.09 CLASS 1 OBESITY DUE TO EXCESS CALORIES WITHOUT SERIOUS COMORBIDITY WITH BODY MASS INDEX (BMI) OF 33.0 TO 33.9 IN ADULT: ICD-10-CM

## 2025-07-29 DIAGNOSIS — E66.811 CLASS 1 OBESITY DUE TO EXCESS CALORIES WITHOUT SERIOUS COMORBIDITY WITH BODY MASS INDEX (BMI) OF 33.0 TO 33.9 IN ADULT: ICD-10-CM

## 2025-07-29 RX ORDER — SEMAGLUTIDE 0.68 MG/ML
0.25 INJECTION, SOLUTION SUBCUTANEOUS
Qty: 3 ML | Refills: 0 | Status: CANCELLED | OUTPATIENT
Start: 2025-07-29

## 2025-07-29 RX ORDER — SEMAGLUTIDE 0.68 MG/ML
0.5 INJECTION, SOLUTION SUBCUTANEOUS
Qty: 3 ML | Refills: 0 | Status: SHIPPED | OUTPATIENT
Start: 2025-07-29

## 2025-07-29 NOTE — TELEPHONE ENCOUNTER
Naga Davalos M.D. to Me (Selected Message)      7/29/25 12:15 PM  4 weeks then raise dose again. It titrates up every 4 weeks

## 2025-07-29 NOTE — TELEPHONE ENCOUNTER
DS: Patient wants to increase dosage. Please advise new dosage and how many weeks or months you advise him to be on it. Thanks!

## 2025-07-29 NOTE — TELEPHONE ENCOUNTER
Hello     Pt would like a refill of the Ozempic but he would like to go up in strength How would Provider like to proceed?

## 2025-08-01 PROCEDURE — RXMED WILLOW AMBULATORY MEDICATION CHARGE: Performed by: INTERNAL MEDICINE

## 2025-08-04 ENCOUNTER — PHARMACY VISIT (OUTPATIENT)
Dept: PHARMACY | Facility: MEDICAL CENTER | Age: 70
End: 2025-08-04
Payer: COMMERCIAL

## 2025-08-25 DIAGNOSIS — E66.811 CLASS 1 OBESITY DUE TO EXCESS CALORIES WITHOUT SERIOUS COMORBIDITY WITH BODY MASS INDEX (BMI) OF 33.0 TO 33.9 IN ADULT: ICD-10-CM

## 2025-08-25 DIAGNOSIS — E66.09 CLASS 1 OBESITY DUE TO EXCESS CALORIES WITHOUT SERIOUS COMORBIDITY WITH BODY MASS INDEX (BMI) OF 33.0 TO 33.9 IN ADULT: ICD-10-CM

## 2025-08-25 RX ORDER — SEMAGLUTIDE 0.68 MG/ML
0.5 INJECTION, SOLUTION SUBCUTANEOUS
Qty: 3 ML | Refills: 0 | Status: CANCELLED | OUTPATIENT
Start: 2025-08-25

## 2025-08-25 RX ORDER — SEMAGLUTIDE 1.34 MG/ML
1 INJECTION, SOLUTION SUBCUTANEOUS
Qty: 3 ML | Refills: 0 | Status: SHIPPED | OUTPATIENT
Start: 2025-08-25